# Patient Record
Sex: FEMALE | Race: WHITE | NOT HISPANIC OR LATINO | Employment: UNEMPLOYED | URBAN - METROPOLITAN AREA
[De-identification: names, ages, dates, MRNs, and addresses within clinical notes are randomized per-mention and may not be internally consistent; named-entity substitution may affect disease eponyms.]

---

## 2017-02-02 ENCOUNTER — HOSPITAL ENCOUNTER (EMERGENCY)
Facility: HOSPITAL | Age: 37
Discharge: HOME | End: 2017-02-02
Attending: EMERGENCY MEDICINE | Admitting: EMERGENCY MEDICINE

## 2017-02-02 ENCOUNTER — APPOINTMENT (OUTPATIENT)
Dept: GENERAL RADIOLOGY | Facility: HOSPITAL | Age: 37
End: 2017-02-02

## 2017-02-02 VITALS
BODY MASS INDEX: 32.14 KG/M2 | DIASTOLIC BLOOD PRESSURE: 102 MMHG | WEIGHT: 200 LBS | RESPIRATION RATE: 14 BRPM | SYSTOLIC BLOOD PRESSURE: 139 MMHG | HEIGHT: 66 IN | TEMPERATURE: 97.4 F | HEART RATE: 96 BPM | OXYGEN SATURATION: 98 %

## 2017-02-02 DIAGNOSIS — K30 INDIGESTION: ICD-10-CM

## 2017-02-02 DIAGNOSIS — S60.221A CONTUSION OF RIGHT HAND, INITIAL ENCOUNTER: Primary | ICD-10-CM

## 2017-02-02 PROCEDURE — 99282 EMERGENCY DEPT VISIT SF MDM: CPT | Performed by: EMERGENCY MEDICINE

## 2017-02-02 PROCEDURE — 99283 EMERGENCY DEPT VISIT LOW MDM: CPT

## 2017-02-02 PROCEDURE — 73130 X-RAY EXAM OF HAND: CPT

## 2017-02-02 RX ORDER — MAGNESIUM HYDROXIDE/ALUMINUM HYDROXICE/SIMETHICONE 120; 1200; 1200 MG/30ML; MG/30ML; MG/30ML
30 SUSPENSION ORAL ONCE
Status: COMPLETED | OUTPATIENT
Start: 2017-02-02 | End: 2017-02-02

## 2017-02-02 RX ORDER — ACETAMINOPHEN 500 MG
500 TABLET ORAL ONCE
Status: COMPLETED | OUTPATIENT
Start: 2017-02-02 | End: 2017-02-02

## 2017-02-02 RX ORDER — SERTRALINE HYDROCHLORIDE 100 MG/1
100 TABLET, FILM COATED ORAL DAILY
COMMUNITY

## 2017-02-02 RX ADMIN — LIDOCAINE HYDROCHLORIDE 15 ML: 20 SOLUTION ORAL; TOPICAL at 04:39

## 2017-02-02 RX ADMIN — ACETAMINOPHEN 500 MG: 500 TABLET, COATED ORAL at 04:41

## 2017-02-02 RX ADMIN — ALUMINUM HYDROXIDE, MAGNESIUM HYDROXIDE, AND SIMETHICONE 30 ML: 200; 200; 20 SUSPENSION ORAL at 04:39

## 2017-02-02 NOTE — ED PROVIDER NOTES
Subjective   History of Present Illness  History of Present Illness    Chief complaint: hand pain, indigestion    Location: Right hand    Quality/Severity:  Moderate pain    Timing/Duration: injury occurred about 1 hr ago    Modifying Factors: worse w/ movement    Narrative: Patient says that she has been having a lot of indigestion and nausea symptoms tonight.  Fortunately, she was actually headed to our facility for assistance with those symptoms when she accidentally shut her own hand in her car door.  This caused some immediate pain and numbness to her right hand near the thumb.  She says she has a lot of pain with movement of her right hand now.  The pain is radiating up towards her wrist and forearm areas from the hand.  She denies any other injuries.  She denies any fevers.  She says she has a history of gastritis and hiatal hernia.  She is cared for by Dr. Salinas her gastroenterologist.  She has an upcoming endoscopy procedure in the near future scheduled.    Associated Symptoms: None    Review of Systems   Constitutional: Negative for activity change and fever.   HENT: Negative.    Eyes: Negative for pain and visual disturbance.   Respiratory: Negative for cough and shortness of breath.    Cardiovascular: Negative for chest pain.   Gastrointestinal: Positive for abdominal pain and nausea. Negative for diarrhea and vomiting.   Genitourinary: Negative for dysuria.   Musculoskeletal: Positive for arthralgias and myalgias.   Skin: Negative for color change and rash.   Neurological: Negative for syncope and headaches.   All other systems reviewed and are negative.      Past Medical History   Diagnosis Date   • Anxiety    • Asthma    • Depression    • Endometriosis    • GERD (gastroesophageal reflux disease)    • Hiatal hernia    • Hiatal hernia    • Hyperlipidemia    • Hypertension    • Injury of back    • Leg pain    • Leukocytosis, likely related to tobacco use    • Liver cyst    • Melanoma       Superficial spreading lentiginous, right lower extremity   • Migraines        Allergies   Allergen Reactions   • Bactrim  [Sulfamethoxazole-Trimethoprim]    • Hydrocodone    • Lisinopril    • Meperidine    • Morphine    • Pregabalin    • Tramadol        Past Surgical History   Procedure Laterality Date   • Skin biopsy     • Skin cancer excision Right      Leg melanoma   • Tubal abdominal ligation     •  section     • Hemorrhoidectomy     • Colonoscopy     • Pelvic laparoscopy     • Appendectomy     • Hysteroscopy endometrial ablation     • Cholecystectomy     • Ovarian cyst removal Left      Benign tumor       Family History   Problem Relation Age of Onset   • Cervical cancer Mother 26   • Skin cancer Mother 54     Basal cell   • Diabetes Mother    • Skin cancer Father 53     Non-melanoma   • Heart disease Father    • Hypertension Father    • Other Daughter      Enlarged spleen   • Brain cancer Other 60   • Pancreatic cancer Other    • Diabetes Other    • Heart disease Other    • Hypertension Other        Social History     Social History   • Marital status:      Spouse name: N/A   • Number of children: N/A   • Years of education: N/A     Occupational History   •  Unemployed     Social History Main Topics   • Smoking status: Current Every Day Smoker     Packs/day: 1.00     Years: 10.00     Types: Cigarettes   • Smokeless tobacco: None   • Alcohol use No   • Drug use: No   • Sexual activity: Not Asked     Other Topics Concern   • None     Social History Narrative     ED Triage Vitals   Temp Heart Rate Resp BP SpO2   171 17 -- 17   97.4 °F (36.3 °C) 96 14  98 %      Temp src Heart Rate Source Patient Position BP Location FiO2 (%)   17 -- -- -- --   Oral               Objective   Physical Exam   Constitutional: She is oriented to person, place, and time. She appears well-developed and well-nourished. She appears distressed (mild).   HENT:   Head:  Normocephalic and atraumatic.   Eyes: EOM are normal. Pupils are equal, round, and reactive to light. Right eye exhibits no discharge. Left eye exhibits no discharge.   Neck: Normal range of motion. Neck supple.   Cardiovascular: Normal rate and regular rhythm.    Pulmonary/Chest: Effort normal. No respiratory distress.   Abdominal: Soft. There is no tenderness.   Musculoskeletal: Normal range of motion. She exhibits tenderness. She exhibits no edema or deformity.   The right hand is moderately tender to palpation along the first metacarpal region and second metacarpal regions.  There is some mild bruising and swelling in this associated region.  The carpal bones appear to be nontender.  The radial pulse is bounding and normal.  The distal sensation is normal and intact all 5 fingers.  Patient shows some slightly decreased range of motion to his thumb extension and flexion but it appears that this is a result of her discomfort with movement rather than a functional deficit.   Neurological: She is alert and oriented to person, place, and time. No cranial nerve deficit.   Skin: Skin is warm and dry. No rash noted. She is not diaphoretic. No erythema.   Psychiatric: She has a normal mood and affect. Her behavior is normal. Judgment and thought content normal.   Nursing note and vitals reviewed.      RADIOLOGY        Study: X-ray right hand    Findings: No acute fracture or dislocation    Interpreted Contemporaneously by self, independently viewed by me          Procedures         ED Course  ED Course   Comment By Time   I reviewed the x-ray which appears to be normal.  Ordered 1 GI cocktail for her GERD symptoms.  No worrisome findings on physical exam to justify further diagnostics right now.  Advised her on continued symptomatic management for her hand injury. Yosi Mathew MD 02/02 8525                  [facility]    Final diagnoses:   Contusion of right hand, initial encounter   Indigestion            Yosi Mathew,  MD  02/02/17 0448

## 2017-02-02 NOTE — DISCHARGE INSTRUCTIONS
Rest, ice and elevate your injured hand as needed.  May take Tylenol every 8 hours as needed for pain.  Please return to the emergency room for any worsening pain, swelling, weakness, numbness or any other concerns.

## 2017-02-02 NOTE — ED NOTES
"Patient was overheard in the waiting room telling her boyfriend \"I am tired of not getting nothing.\"  Patient's boyfriend responded by saying, \"Next time maybe I'll try.\"     Jaycee Girard RN  02/02/17 0519    "

## 2017-02-07 ENCOUNTER — HOSPITAL ENCOUNTER (EMERGENCY)
Facility: HOSPITAL | Age: 37
Discharge: HOME | End: 2017-02-07
Attending: EMERGENCY MEDICINE | Admitting: EMERGENCY MEDICINE

## 2017-02-07 ENCOUNTER — APPOINTMENT (OUTPATIENT)
Dept: GENERAL RADIOLOGY | Facility: HOSPITAL | Age: 37
End: 2017-02-07

## 2017-02-07 VITALS
HEIGHT: 66 IN | RESPIRATION RATE: 16 BRPM | OXYGEN SATURATION: 98 % | WEIGHT: 199 LBS | HEART RATE: 99 BPM | TEMPERATURE: 97.8 F | SYSTOLIC BLOOD PRESSURE: 134 MMHG | DIASTOLIC BLOOD PRESSURE: 108 MMHG | BODY MASS INDEX: 31.98 KG/M2

## 2017-02-07 DIAGNOSIS — J40 BRONCHITIS: Primary | ICD-10-CM

## 2017-02-07 PROCEDURE — 71020 HC CHEST PA AND LATERAL: CPT

## 2017-02-07 PROCEDURE — 99282 EMERGENCY DEPT VISIT SF MDM: CPT | Performed by: EMERGENCY MEDICINE

## 2017-02-07 PROCEDURE — 99283 EMERGENCY DEPT VISIT LOW MDM: CPT

## 2017-02-07 RX ORDER — AZITHROMYCIN 250 MG/1
TABLET, FILM COATED ORAL
Qty: 6 TABLET | Refills: 0 | Status: SHIPPED | OUTPATIENT
Start: 2017-02-07 | End: 2017-08-15

## 2017-02-07 RX ORDER — BENZONATATE 200 MG/1
200 CAPSULE ORAL 3 TIMES DAILY PRN
Qty: 30 CAPSULE | Refills: 0 | Status: SHIPPED | OUTPATIENT
Start: 2017-02-07 | End: 2017-08-15

## 2017-02-07 RX ORDER — AZITHROMYCIN 250 MG/1
500 TABLET, FILM COATED ORAL ONCE
Status: COMPLETED | OUTPATIENT
Start: 2017-02-07 | End: 2017-02-07

## 2017-02-07 RX ORDER — BENZONATATE 100 MG/1
200 CAPSULE ORAL ONCE
Status: COMPLETED | OUTPATIENT
Start: 2017-02-07 | End: 2017-02-07

## 2017-02-07 RX ORDER — TRAZODONE HYDROCHLORIDE 100 MG/1
100 TABLET ORAL AS NEEDED
COMMUNITY
End: 2017-08-15

## 2017-02-07 RX ADMIN — AZITHROMYCIN 500 MG: 250 TABLET, FILM COATED ORAL at 02:10

## 2017-02-07 RX ADMIN — BENZONATATE 200 MG: 100 CAPSULE, LIQUID FILLED ORAL at 02:10

## 2017-02-07 NOTE — ED PROVIDER NOTES
Subjective     History provided by:  Patient    History of Present Illness    · Chief complaint: Cough and side pain    · Location: pain in both of her sides of her chest posteriorly    · Quality/Severity: Nonproductive cough.  Sharp pain.    · Timing/Onset: Cough is been present for the last 3 months.  The pain started a couple of days ago.    · Modifying Factors: Coughing and taking a deep breath exacerbates the discomfort in her posterior sides.    · Associated symptoms: He denies any fever or veena shortness of breath.  She states the cough is causing her to get a headache.    Narrative: The patient is a 36-year-old white female complaining of a 3 month history of a cough.  She states she seen her PCP Fernanda moreno monthly for this and states she Is getting pneumonia and bronchitis for which she's been treated with antibiotics.  The last couple of days she's had pain in her size when she coughs and takes a deep breath.  Patient is a smoker.  She states she was formally in pain management, and her explanation for why she is no longer in pain management made no sense.  She states she doesn't have menstrual periods as she is status post uterine ablation.  She is unemployed.    ED Triage Vitals   Temp Heart Rate Resp BP SpO2   02/07/17 0047 02/07/17 0047 02/07/17 0053 02/07/17 0047 02/07/17 0047   97.8 °F (36.6 °C) 99 16 134/108 98 %      Temp src Heart Rate Source Patient Position BP Location FiO2 (%)   -- -- -- -- --              Review of Systems   Constitutional: Negative for activity change, appetite change, chills, diaphoresis, fatigue and fever.   HENT: Negative for congestion, dental problem, ear pain, hearing loss, mouth sores, nosebleeds, postnasal drip, rhinorrhea, sinus pressure, sore throat, trouble swallowing and voice change.    Eyes: Negative for photophobia, pain, discharge, redness and visual disturbance.   Respiratory: Positive for cough. Negative for chest tightness, shortness of breath, wheezing  and stridor.    Cardiovascular: Negative for chest pain, palpitations and leg swelling.   Gastrointestinal: Negative for abdominal pain, diarrhea, nausea and vomiting.   Genitourinary: Negative for difficulty urinating, dysuria, flank pain, frequency, hematuria and urgency.   Musculoskeletal: Positive for back pain. Negative for arthralgias, gait problem, joint swelling, myalgias, neck pain and neck stiffness.   Skin: Negative for color change and rash.   Neurological: Positive for headaches. Negative for dizziness, tremors, seizures, syncope, facial asymmetry, speech difficulty, weakness, light-headedness and numbness.   Hematological: Negative for adenopathy.   Psychiatric/Behavioral: Negative.  Negative for confusion and decreased concentration. The patient is not nervous/anxious.        Past Medical History   Diagnosis Date   • Anxiety    • Asthma    • Depression    • Endometriosis    • GERD (gastroesophageal reflux disease)    • Hiatal hernia    • Hiatal hernia    • Hyperlipidemia    • Hypertension    • Injury of back    • Leg pain    • Leukocytosis, likely related to tobacco use    • Liver cyst    • Melanoma      Superficial spreading lentiginous, right lower extremity   • Migraines        Allergies   Allergen Reactions   • Bactrim  [Sulfamethoxazole-Trimethoprim]    • Hydrocodone    • Lisinopril    • Meperidine    • Morphine    • Pregabalin    • Tramadol        Past Surgical History   Procedure Laterality Date   • Skin biopsy     • Skin cancer excision Right      Leg melanoma   • Tubal abdominal ligation     •  section     • Hemorrhoidectomy     • Colonoscopy     • Pelvic laparoscopy     • Appendectomy     • Hysteroscopy endometrial ablation     • Cholecystectomy     • Ovarian cyst removal Left      Benign tumor   • Ovary surgery Left 2000       Family History   Problem Relation Age of Onset   • Cervical cancer Mother 26   • Skin cancer Mother 54     Basal cell   • Diabetes Mother    • Skin cancer  Father 53     Non-melanoma   • Heart disease Father    • Hypertension Father    • Other Daughter      Enlarged spleen   • Brain cancer Other 60   • Pancreatic cancer Other    • Diabetes Other    • Heart disease Other    • Hypertension Other        Social History     Social History   • Marital status:      Spouse name: N/A   • Number of children: N/A   • Years of education: N/A     Occupational History   •  Unemployed     Social History Main Topics   • Smoking status: Current Every Day Smoker     Packs/day: 1.00     Years: 10.00     Types: Cigarettes   • Smokeless tobacco: None   • Alcohol use No   • Drug use: No   • Sexual activity: Not Asked     Other Topics Concern   • None     Social History Narrative   • None           Objective   Physical Exam   Constitutional: She is oriented to person, place, and time. She appears well-developed and well-nourished. No distress.   HENT:   Head: Normocephalic and atraumatic.   Right Ear: External ear normal.   Left Ear: External ear normal.   Nose: Nose normal.   Mouth/Throat: Oropharynx is clear and moist. No oropharyngeal exudate.   Eyes: Conjunctivae and EOM are normal. Pupils are equal, round, and reactive to light. Right eye exhibits no discharge. Left eye exhibits no discharge. No scleral icterus.   Neck: Normal range of motion. Neck supple. No JVD present. No thyromegaly present.   Cardiovascular: Normal rate, regular rhythm and normal heart sounds.    No murmur heard.  Pulmonary/Chest: Effort normal and breath sounds normal. She has no wheezes. She has no rales. She exhibits no tenderness.   Abdominal: Soft. Bowel sounds are normal. She exhibits no distension. There is no tenderness.   Musculoskeletal: Normal range of motion. She exhibits no edema, tenderness or deformity.   Lymphadenopathy:     She has no cervical adenopathy.   Neurological: She is alert and oriented to person, place, and time. No cranial nerve deficit. Coordination normal.   No focal motor  sensory deficit   Skin: Skin is warm and dry. No rash noted. She is not diaphoretic.   Psychiatric: She has a normal mood and affect. Her behavior is normal. Judgment and thought content normal.   Nursing note and vitals reviewed.      Procedures         ED Course  ED Course   Comment By Time   [facility] Report [id number] shows the patient fill her oxycodone on 5 mg #60 monthly up until August 31 of last year.  The patient had been in pain management. Krish Barry MD 02/07 0116                  MDM  Number of Diagnoses or Management Options  Bronchitis: new and requires workup     Amount and/or Complexity of Data Reviewed  Tests in the radiology section of CPT®: ordered and reviewed  Independent visualization of images, tracings, or specimens: yes    Risk of Complications, Morbidity, and/or Mortality  Presenting problems: moderate  Diagnostic procedures: moderate  Management options: moderate  General comments: My differential diagnosis includes but is not limited to: Viral upper respiratory tract infection, viral bronchitis, viral pneumonia, bacterial bronchitis, bacterial pneumonia, pertussis, influenza, asthma exacerbation, environmental allergen    Patient Progress  Patient progress: improved      Final diagnoses:   Bronchitis           Labs Reviewed - No data to display  XR Chest 2 View   ED Interpretation   No acute disease.             Medication List      New Prescriptions          azithromycin 250 MG tablet   Commonly known as:  ZITHROMAX Z-LISBETH   Take 2 tablets the first day, then 1 tablet daily for 4 days.       benzonatate 200 MG capsule   Commonly known as:  TESSALON   Take 1 capsule by mouth 3 (Three) Times a Day As Needed for cough for up   to 30 doses.         Stop          citalopram 40 MG tablet   Commonly known as:  CeleXA       PERCOCET 5-325 MG per tablet   Generic drug:  oxyCODONE-acetaminophen       VITAMIN B COMPLEX-C capsule       WELCHOL 625 MG tablet   Generic drug:  colesevelam                 Krish Barry MD  02/07/17 8899

## 2017-02-08 ENCOUNTER — LAB REQUISITION (OUTPATIENT)
Dept: LAB | Facility: HOSPITAL | Age: 37
End: 2017-02-08

## 2017-02-08 DIAGNOSIS — K30 FUNCTIONAL DYSPEPSIA: ICD-10-CM

## 2017-02-08 PROCEDURE — 88312 SPECIAL STAINS GROUP 1: CPT | Performed by: INTERNAL MEDICINE

## 2017-02-08 PROCEDURE — 88305 TISSUE EXAM BY PATHOLOGIST: CPT | Performed by: INTERNAL MEDICINE

## 2017-02-10 LAB
CYTO UR: NORMAL
LAB AP CASE REPORT: NORMAL
Lab: NORMAL
PATH REPORT.FINAL DX SPEC: NORMAL
PATH REPORT.GROSS SPEC: NORMAL

## 2017-04-14 ENCOUNTER — HOSPITAL ENCOUNTER (EMERGENCY)
Facility: HOSPITAL | Age: 37
Discharge: HOME | End: 2017-04-14
Attending: EMERGENCY MEDICINE | Admitting: EMERGENCY MEDICINE

## 2017-04-14 VITALS
TEMPERATURE: 98.3 F | OXYGEN SATURATION: 98 % | SYSTOLIC BLOOD PRESSURE: 167 MMHG | WEIGHT: 190 LBS | HEART RATE: 95 BPM | HEIGHT: 66 IN | BODY MASS INDEX: 30.53 KG/M2 | RESPIRATION RATE: 18 BRPM | DIASTOLIC BLOOD PRESSURE: 111 MMHG

## 2017-04-14 DIAGNOSIS — L73.9 FOLLICULITIS: Primary | ICD-10-CM

## 2017-04-14 PROCEDURE — 99282 EMERGENCY DEPT VISIT SF MDM: CPT

## 2017-04-14 PROCEDURE — 99282 EMERGENCY DEPT VISIT SF MDM: CPT | Performed by: EMERGENCY MEDICINE

## 2017-04-14 RX ORDER — HYDROXYZINE PAMOATE 25 MG/1
25 CAPSULE ORAL 3 TIMES DAILY PRN
Qty: 30 CAPSULE | Refills: 0 | Status: SHIPPED | OUTPATIENT
Start: 2017-04-14 | End: 2017-08-15

## 2017-04-14 RX ORDER — PREDNISONE 10 MG/1
TABLET ORAL
Qty: 21 TABLET | Refills: 0 | Status: SHIPPED | OUTPATIENT
Start: 2017-04-14 | End: 2017-08-15

## 2017-04-14 RX ORDER — CEPHALEXIN 500 MG/1
500 CAPSULE ORAL 3 TIMES DAILY
Qty: 21 CAPSULE | Refills: 0 | Status: SHIPPED | OUTPATIENT
Start: 2017-04-14 | End: 2017-04-21

## 2017-04-14 NOTE — DISCHARGE INSTRUCTIONS
Medications directed.  Wash lesions 3 times daily with antibacterial soap.  Pat dry.  Apply thin layer of Neosporin.  Do not pick at lesions.  Do not scratch lesions.  Follow up with Dr. Lieberman as above.  Return to ED for medical emergencies.    Hypertension  Hypertension, commonly called high blood pressure, is when the force of blood pumping through your arteries is too strong. Your arteries are the blood vessels that carry blood from your heart throughout your body. A blood pressure reading consists of a higher number over a lower number, such as 110/72. The higher number (systolic) is the pressure inside your arteries when your heart pumps. The lower number (diastolic) is the pressure inside your arteries when your heart relaxes. Ideally you want your blood pressure below 120/80.  Hypertension forces your heart to work harder to pump blood. Your arteries may become narrow or stiff. Having untreated or uncontrolled hypertension can cause heart attack, stroke, kidney disease, and other problems.  RISK FACTORS  Some risk factors for high blood pressure are controllable. Others are not.   Risk factors you cannot control include:   · Race. You may be at higher risk if you are .  · Age. Risk increases with age.  · Gender. Men are at higher risk than women before age 45 years. After age 65, women are at higher risk than men.  Risk factors you can control include:  · Not getting enough exercise or physical activity.  · Being overweight.  · Getting too much fat, sugar, calories, or salt in your diet.  · Drinking too much alcohol.  SIGNS AND SYMPTOMS  Hypertension does not usually cause signs or symptoms. Extremely high blood pressure (hypertensive crisis) may cause headache, anxiety, shortness of breath, and nosebleed.  DIAGNOSIS  To check if you have hypertension, your health care provider will measure your blood pressure while you are seated, with your arm held at the level of your heart. It should be  measured at least twice using the same arm. Certain conditions can cause a difference in blood pressure between your right and left arms. A blood pressure reading that is higher than normal on one occasion does not mean that you need treatment. If it is not clear whether you have high blood pressure, you may be asked to return on a different day to have your blood pressure checked again. Or, you may be asked to monitor your blood pressure at home for 1 or more weeks.  TREATMENT  Treating high blood pressure includes making lifestyle changes and possibly taking medicine. Living a healthy lifestyle can help lower high blood pressure. You may need to change some of your habits.  Lifestyle changes may include:  · Following the DASH diet. This diet is high in fruits, vegetables, and whole grains. It is low in salt, red meat, and added sugars.  · Keep your sodium intake below 2,300 mg per day.  · Getting at least 30-45 minutes of aerobic exercise at least 4 times per week.  · Losing weight if necessary.  · Not smoking.  · Limiting alcoholic beverages.  · Learning ways to reduce stress.  Your health care provider may prescribe medicine if lifestyle changes are not enough to get your blood pressure under control, and if one of the following is true:  · You are 18-59 years of age and your systolic blood pressure is above 140.  · You are 60 years of age or older, and your systolic blood pressure is above 150.  · Your diastolic blood pressure is above 90.  · You have diabetes, and your systolic blood pressure is over 140 or your diastolic blood pressure is over 90.  · You have kidney disease and your blood pressure is above 140/90.  · You have heart disease and your blood pressure is above 140/90.  Your personal target blood pressure may vary depending on your medical conditions, your age, and other factors.  HOME CARE INSTRUCTIONS  · Have your blood pressure rechecked as directed by your health care provider.    · Take  medicines only as directed by your health care provider. Follow the directions carefully. Blood pressure medicines must be taken as prescribed. The medicine does not work as well when you skip doses. Skipping doses also puts you at risk for problems.  · Do not smoke.    · Monitor your blood pressure at home as directed by your health care provider.   SEEK MEDICAL CARE IF:   · You think you are having a reaction to medicines taken.  · You have recurrent headaches or feel dizzy.  · You have swelling in your ankles.  · You have trouble with your vision.  SEEK IMMEDIATE MEDICAL CARE IF:  · You develop a severe headache or confusion.  · You have unusual weakness, numbness, or feel faint.  · You have severe chest or abdominal pain.  · You vomit repeatedly.  · You have trouble breathing.  MAKE SURE YOU:   · Understand these instructions.  · Will watch your condition.  · Will get help right away if you are not doing well or get worse.     This information is not intended to replace advice given to you by your health care provider. Make sure you discuss any questions you have with your health care provider.     Document Released: 12/18/2006 Document Revised: 05/03/2016 Document Reviewed: 10/10/2014  [employer] Patient Education ©2016 [employer]

## 2017-04-14 NOTE — ED PROVIDER NOTES
"Subjective   Patient is a 36 y.o. female presenting with rash.   History provided by:  Patient  Rash   Location:  Face and torso  Facial rash location:  Face  Torso rash location:  L chest, R chest, abd LLQ and abd RLQ  Quality: itchiness    Severity:  Moderate  Duration:  1 week  Progression:  Worsening  Chronicity:  New  Context: new detergent/soap    Context: not animal contact, not chemical exposure, not diapers, not eggs, not exposure to similar rash, not food, not hot tub use, not insect bite/sting, not medications, not nuts, not plant contact, not pollen, not pregnancy, not sick contacts and not sun exposure    Relieved by:  None tried  Exacerbated by: scratching.  Ineffective treatments:  None tried  Associated symptoms: no abdominal pain, no diarrhea, no fatigue, no fever, no headaches, no hoarse voice, no induration, no joint pain, no myalgias, no nausea, no periorbital edema, no shortness of breath, no sore throat, no throat swelling, no tongue swelling, no URI, not vomiting and not wheezing      HPI Narrative:Ms. Dietz is a 35 yo WF who presents secondary to rash. Onset 1 week ago.  Patient reports the rash began on her abdomen.  It now involves her chest and face.  Patient reports associated itching.  States that she has been picking at the lesions.  She states her s.o. Bought some \"cheap detergent\" but no other changes in her home or work environment. Pt presents for evaluation.        Review of Systems   Constitutional: Negative.  Negative for appetite change, diaphoresis, fatigue and fever.   HENT: Negative.  Negative for hoarse voice and sore throat.    Eyes: Negative.    Respiratory: Negative for cough, chest tightness, shortness of breath and wheezing.    Cardiovascular: Negative for chest pain, palpitations and leg swelling.   Gastrointestinal: Negative for abdominal pain, diarrhea, nausea and vomiting.   Genitourinary: Negative.  Negative for difficulty urinating, flank pain, frequency and " hematuria.   Musculoskeletal: Negative.  Negative for arthralgias and myalgias.   Skin: Positive for rash. Negative for color change and pallor.   Neurological: Negative.  Negative for dizziness, seizures, syncope and headaches.   Psychiatric/Behavioral: Negative.  Negative for agitation, behavioral problems and hallucinations.       Past Medical History:   Diagnosis Date   • Anxiety    • Asthma    • Depression    • Endometriosis    • GERD (gastroesophageal reflux disease)    • Hiatal hernia    • Hiatal hernia    • Hyperlipidemia    • Hypertension    • Injury of back    • Leg pain    • Leukocytosis, likely related to tobacco use    • Liver cyst    • Melanoma     Superficial spreading lentiginous, right lower extremity   • Migraines        Allergies   Allergen Reactions   • Bactrim  [Sulfamethoxazole-Trimethoprim]    • Hydrocodone    • Lisinopril    • Meperidine    • Morphine    • Pregabalin    • Tramadol        Past Surgical History:   Procedure Laterality Date   • APPENDECTOMY     •  SECTION     • CHOLECYSTECTOMY     • COLONOSCOPY     • HEMORRHOIDECTOMY     • HYSTEROSCOPY ENDOMETRIAL ABLATION     • OVARIAN CYST REMOVAL Left     Benign tumor   • OVARY SURGERY Left    • PELVIC LAPAROSCOPY     • SKIN BIOPSY     • SKIN CANCER EXCISION Right     Leg melanoma   • TUBAL ABDOMINAL LIGATION         Family History   Problem Relation Age of Onset   • Cervical cancer Mother 26   • Skin cancer Mother 54     Basal cell   • Diabetes Mother    • Skin cancer Father 53     Non-melanoma   • Heart disease Father    • Hypertension Father    • Other Daughter      Enlarged spleen   • Brain cancer Other 60   • Pancreatic cancer Other    • Diabetes Other    • Heart disease Other    • Hypertension Other        Social History     Social History   • Marital status:      Spouse name: N/A   • Number of children: N/A   • Years of education: N/A     Occupational History   •  Unemployed     Social History Main Topics   •  Smoking status: Current Every Day Smoker     Packs/day: 1.00     Years: 10.00     Types: Cigarettes   • Smokeless tobacco: None   • Alcohol use No   • Drug use: No   • Sexual activity: Not Asked     Other Topics Concern   • None     Social History Narrative           Objective   Physical Exam   Constitutional: She is oriented to person, place, and time. She appears well-developed and well-nourished. No distress.   HENT:   Head: Normocephalic and atraumatic.   Right Ear: External ear normal.   Left Ear: External ear normal.   Nose: Nose normal.   Mouth/Throat: Oropharynx is clear and moist.   Eyes: Conjunctivae and EOM are normal. Pupils are equal, round, and reactive to light.   Neck: Normal range of motion. Neck supple.   Pulmonary/Chest: Effort normal.   Musculoskeletal: Normal range of motion.   Neurological: She is alert and oriented to person, place, and time. She has normal reflexes.   Skin: Skin is warm and dry. Lesion noted. She is not diaphoretic.        Psychiatric: She has a normal mood and affect. Her behavior is normal.   Nursing note and vitals reviewed.      Procedures         ED Course  ED Course   Comment By Time   04/14/17  7:17 PM  Etiology of patient's rash is unclear.  While  changed laundry detergents the rash does follow clothing lines.  She does not have a rash involving the well-being of her hands or feet, axilla or groin region.  Not consistent with scabies or bedbugs.  I will treat as folliculitis.  Starting patient on antibiotics, steroids and Vistaril.  Patient is under the care of Dr. Zechariah Lieberman dermatology for melanoma.  I recommend she follow-up with him this coming week. Sandro Wallis MD 04/14 6831                  [facility]  Number of Diagnoses or Management Options  Folliculitis: new and does not require workup  Risk of Complications, Morbidity, and/or Mortality  Presenting problems: low  Diagnostic procedures: minimal  Management options: minimal    Patient  Progress  Patient progress: stable      Final diagnoses:   Folliculitis            Sandro Wallis MD  04/14/17 2390

## 2017-05-18 ENCOUNTER — HOSPITAL ENCOUNTER (EMERGENCY)
Facility: HOSPITAL | Age: 37
Discharge: HOME | End: 2017-05-18
Attending: EMERGENCY MEDICINE | Admitting: EMERGENCY MEDICINE

## 2017-05-18 VITALS
HEART RATE: 94 BPM | DIASTOLIC BLOOD PRESSURE: 84 MMHG | WEIGHT: 193.38 LBS | BODY MASS INDEX: 31.08 KG/M2 | OXYGEN SATURATION: 97 % | RESPIRATION RATE: 20 BRPM | HEIGHT: 66 IN | TEMPERATURE: 99.1 F | SYSTOLIC BLOOD PRESSURE: 132 MMHG

## 2017-05-18 DIAGNOSIS — J02.9 ACUTE PHARYNGITIS, UNSPECIFIED ETIOLOGY: Primary | ICD-10-CM

## 2017-05-18 LAB — S PYO AG THROAT QL: NEGATIVE

## 2017-05-18 PROCEDURE — 99282 EMERGENCY DEPT VISIT SF MDM: CPT | Performed by: EMERGENCY MEDICINE

## 2017-05-18 PROCEDURE — 87081 CULTURE SCREEN ONLY: CPT | Performed by: EMERGENCY MEDICINE

## 2017-05-18 PROCEDURE — 87880 STREP A ASSAY W/OPTIC: CPT | Performed by: EMERGENCY MEDICINE

## 2017-05-18 PROCEDURE — 99283 EMERGENCY DEPT VISIT LOW MDM: CPT

## 2017-05-18 RX ORDER — AMOXICILLIN 500 MG/1
500 CAPSULE ORAL 3 TIMES DAILY
Qty: 21 CAPSULE | Refills: 0 | Status: SHIPPED | OUTPATIENT
Start: 2017-05-18 | End: 2017-05-25

## 2017-05-20 LAB — BACTERIA SPEC AEROBE CULT: NORMAL

## 2017-05-28 ENCOUNTER — HOSPITAL ENCOUNTER (EMERGENCY)
Facility: HOSPITAL | Age: 37
Discharge: HOME | End: 2017-05-28
Attending: EMERGENCY MEDICINE | Admitting: EMERGENCY MEDICINE

## 2017-05-28 VITALS
OXYGEN SATURATION: 95 % | TEMPERATURE: 98.1 F | HEIGHT: 66 IN | HEART RATE: 82 BPM | RESPIRATION RATE: 18 BRPM | BODY MASS INDEX: 30.86 KG/M2 | SYSTOLIC BLOOD PRESSURE: 151 MMHG | WEIGHT: 192 LBS | DIASTOLIC BLOOD PRESSURE: 97 MMHG

## 2017-05-28 DIAGNOSIS — R51.9 RECURRENT HEADACHE: Primary | ICD-10-CM

## 2017-05-28 DIAGNOSIS — H10.33 ACUTE CONJUNCTIVITIS OF BOTH EYES, UNSPECIFIED ACUTE CONJUNCTIVITIS TYPE: ICD-10-CM

## 2017-05-28 PROCEDURE — 99283 EMERGENCY DEPT VISIT LOW MDM: CPT

## 2017-05-28 PROCEDURE — 63710000001 PROCHLORPERAZINE MALEATE PER 5 MG: Performed by: EMERGENCY MEDICINE

## 2017-05-28 PROCEDURE — 99282 EMERGENCY DEPT VISIT SF MDM: CPT | Performed by: EMERGENCY MEDICINE

## 2017-05-28 RX ORDER — ERYTHROMYCIN 5 MG/G
OINTMENT OPHTHALMIC EVERY 6 HOURS
Qty: 3.5 G | Refills: 0 | Status: SHIPPED | OUTPATIENT
Start: 2017-05-28 | End: 2017-06-02

## 2017-05-28 RX ORDER — PROCHLORPERAZINE MALEATE 5 MG/1
10 TABLET ORAL ONCE
Status: COMPLETED | OUTPATIENT
Start: 2017-05-28 | End: 2017-05-28

## 2017-05-28 RX ORDER — OXYCODONE HYDROCHLORIDE AND ACETAMINOPHEN 5; 325 MG/1; MG/1
1 TABLET ORAL ONCE
Status: COMPLETED | OUTPATIENT
Start: 2017-05-28 | End: 2017-05-28

## 2017-05-28 RX ADMIN — OXYCODONE HYDROCHLORIDE AND ACETAMINOPHEN 1 TABLET: 5; 325 TABLET ORAL at 15:44

## 2017-05-28 RX ADMIN — PROCHLORPERAZINE MALEATE 10 MG: 5 TABLET, FILM COATED ORAL at 15:44

## 2017-06-17 ENCOUNTER — HOSPITAL ENCOUNTER (EMERGENCY)
Facility: HOSPITAL | Age: 37
Discharge: HOME | End: 2017-06-18
Attending: EMERGENCY MEDICINE | Admitting: EMERGENCY MEDICINE

## 2017-06-17 DIAGNOSIS — R25.2 MUSCLE CRAMPS: ICD-10-CM

## 2017-06-17 DIAGNOSIS — R51.9 GENERALIZED HEADACHE: Primary | ICD-10-CM

## 2017-06-17 PROCEDURE — 96374 THER/PROPH/DIAG INJ IV PUSH: CPT

## 2017-06-17 PROCEDURE — 96375 TX/PRO/DX INJ NEW DRUG ADDON: CPT

## 2017-06-17 PROCEDURE — 25010000002 KETOROLAC TROMETHAMINE PER 15 MG: Performed by: EMERGENCY MEDICINE

## 2017-06-17 PROCEDURE — 80053 COMPREHEN METABOLIC PANEL: CPT | Performed by: EMERGENCY MEDICINE

## 2017-06-17 PROCEDURE — 99284 EMERGENCY DEPT VISIT MOD MDM: CPT

## 2017-06-17 PROCEDURE — 25010000002 ONDANSETRON PER 1 MG: Performed by: EMERGENCY MEDICINE

## 2017-06-17 PROCEDURE — 99282 EMERGENCY DEPT VISIT SF MDM: CPT | Performed by: EMERGENCY MEDICINE

## 2017-06-17 RX ORDER — ONDANSETRON 2 MG/ML
4 INJECTION INTRAMUSCULAR; INTRAVENOUS ONCE
Status: COMPLETED | OUTPATIENT
Start: 2017-06-17 | End: 2017-06-17

## 2017-06-17 RX ORDER — KETOROLAC TROMETHAMINE 30 MG/ML
30 INJECTION, SOLUTION INTRAMUSCULAR; INTRAVENOUS ONCE
Status: COMPLETED | OUTPATIENT
Start: 2017-06-17 | End: 2017-06-17

## 2017-06-17 RX ORDER — SODIUM CHLORIDE 0.9 % (FLUSH) 0.9 %
10 SYRINGE (ML) INJECTION AS NEEDED
Status: DISCONTINUED | OUTPATIENT
Start: 2017-06-17 | End: 2017-06-18 | Stop reason: HOSPADM

## 2017-06-17 RX ADMIN — KETOROLAC TROMETHAMINE 30 MG: 30 INJECTION INTRAMUSCULAR; INTRAVENOUS at 23:49

## 2017-06-17 RX ADMIN — ONDANSETRON 4 MG: 2 INJECTION, SOLUTION INTRAMUSCULAR; INTRAVENOUS at 23:52

## 2017-06-18 VITALS
WEIGHT: 182 LBS | TEMPERATURE: 98.3 F | RESPIRATION RATE: 16 BRPM | HEART RATE: 76 BPM | SYSTOLIC BLOOD PRESSURE: 153 MMHG | BODY MASS INDEX: 29.25 KG/M2 | HEIGHT: 66 IN | DIASTOLIC BLOOD PRESSURE: 94 MMHG | OXYGEN SATURATION: 98 %

## 2017-06-18 LAB
ALBUMIN SERPL-MCNC: 4 G/DL (ref 3.5–5.2)
ALBUMIN/GLOB SERPL: 1.7 G/DL
ALP SERPL-CCNC: 53 U/L (ref 40–129)
ALT SERPL W P-5'-P-CCNC: 11 U/L (ref 5–33)
ANION GAP SERPL CALCULATED.3IONS-SCNC: 13.7 MMOL/L
AST SERPL-CCNC: 12 U/L (ref 5–32)
BILIRUB SERPL-MCNC: <0.2 MG/DL (ref 0.2–1.2)
BUN BLD-MCNC: 7 MG/DL (ref 6–20)
BUN/CREAT SERPL: 8.9 (ref 7–25)
CALCIUM SPEC-SCNC: 9 MG/DL (ref 8.6–10.5)
CHLORIDE SERPL-SCNC: 104 MMOL/L (ref 98–107)
CO2 SERPL-SCNC: 22.3 MMOL/L (ref 22–29)
CREAT BLD-MCNC: 0.79 MG/DL (ref 0.57–1)
GFR SERPL CREATININE-BSD FRML MDRD: 83 ML/MIN/1.73
GLOBULIN UR ELPH-MCNC: 2.4 GM/DL
GLUCOSE BLD-MCNC: 97 MG/DL (ref 65–99)
POTASSIUM BLD-SCNC: 3.5 MMOL/L (ref 3.5–5.2)
PROT SERPL-MCNC: 6.4 G/DL (ref 6–8.5)
SODIUM BLD-SCNC: 140 MMOL/L (ref 136–145)

## 2017-06-18 NOTE — ED PROVIDER NOTES
"Subjective   Patient is a 36 y.o. female presenting with headaches.   History provided by:  Patient  Headache   Pain location:  Generalized  Radiates to:  Does not radiate  Onset quality:  Gradual  Duration: onset this morning.  Greater than 12 hours of duration.  Timing:  Constant  Chronicity:  Recurrent  Similar to prior headaches: yes    Context comment:  As described below.  Relieved by:  None tried  Worsened by:  Nothing  Ineffective treatments:  None tried  Associated symptoms: nausea and vomiting    Associated symptoms: no cough, no dizziness, no fever and no seizures    Associated symptoms comment:  Nose bleed x 3; LE muscle cramps.  Vomiting:     Number of occurrences:  1    HPI Narrative:Ms Dietz is a 35 yo WF who presents secondary to headache, nausea and vomiting, leg cramps and nosebleeds.  Patient reports she had onset of nausea last night.  Headache began this morning.  She states it is a \"migraine\".  However she describes a generalized headache.  No aura preceding the headache.  Vomiting ×1.  Patient also complains of muscle aches and leg cramps.  She has a history of hypokalemia.  Patient also reports nosebleeds ×3 today these resolve spontaneously.  Patient has not taken any pain relieving medication despite a headache for over 12 hours.  Patient has taken an antinausea medicine location without improvement.  Patient presents for evaluation.        Review of Systems   Constitutional: Negative.  Negative for appetite change, diaphoresis and fever.   Eyes: Negative.    Respiratory: Negative for cough, chest tightness, shortness of breath and wheezing.    Cardiovascular: Negative for chest pain, palpitations and leg swelling.   Gastrointestinal: Positive for nausea and vomiting.   Genitourinary: Negative.  Negative for difficulty urinating, flank pain, frequency and hematuria.   Musculoskeletal: Negative.    Skin: Negative.  Negative for color change, pallor and rash.   Neurological: Positive for " headaches. Negative for dizziness, seizures and syncope.   Psychiatric/Behavioral: Negative.  Negative for agitation, behavioral problems and hallucinations.       Past Medical History:   Diagnosis Date   • Anxiety    • Asthma    • Depression    • Endometriosis    • GERD (gastroesophageal reflux disease)    • Hiatal hernia    • Hiatal hernia    • Hyperlipidemia    • Hypertension    • Injury of back    • Leg pain    • Leukocytosis, likely related to tobacco use    • Liver cyst    • Melanoma     Superficial spreading lentiginous, right lower extremity   • Migraines        Allergies   Allergen Reactions   • Bactrim  [Sulfamethoxazole-Trimethoprim]    • Hydrocodone    • Lisinopril    • Meperidine    • Morphine    • Other Swelling     Erythromycin ophthalmic   • Pregabalin    • Tramadol        Past Surgical History:   Procedure Laterality Date   • APPENDECTOMY     •  SECTION     • CHOLECYSTECTOMY     • COLONOSCOPY     • HEMORRHOIDECTOMY     • HYSTEROSCOPY ENDOMETRIAL ABLATION     • OVARIAN CYST REMOVAL Left     Benign tumor   • OVARY SURGERY Left    • PELVIC LAPAROSCOPY     • SKIN BIOPSY     • SKIN CANCER EXCISION Right     Leg melanoma   • TUBAL ABDOMINAL LIGATION         Family History   Problem Relation Age of Onset   • Cervical cancer Mother 26   • Skin cancer Mother 54     Basal cell   • Diabetes Mother    • Skin cancer Father 53     Non-melanoma   • Heart disease Father    • Hypertension Father    • Other Daughter      Enlarged spleen   • Brain cancer Other 60   • Pancreatic cancer Other    • Diabetes Other    • Heart disease Other    • Hypertension Other        Social History     Social History   • Marital status:      Spouse name: N/A   • Number of children: N/A   • Years of education: N/A     Occupational History   •  Unemployed     Social History Main Topics   • Smoking status: Current Every Day Smoker     Packs/day: 0.50     Years: 10.00     Types: Cigarettes   • Smokeless tobacco: None   •  Alcohol use No   • Drug use: No   • Sexual activity: Not Asked     Other Topics Concern   • None     Social History Narrative   • None           Objective   Physical Exam   Constitutional: She is oriented to person, place, and time. She appears well-developed and well-nourished. No distress.   35 yo WF lying in bed. Overhead lights are on. Pt and spouse intently watching TV. Pt's gaze rarely moves from TV despite my questions and physical exam. Pt does not show any outward indication of HA or discomfort.    HENT:   Head: Normocephalic and atraumatic.   Right Ear: External ear normal.   Left Ear: External ear normal.   Nose: Nose normal.   Mouth/Throat: Oropharynx is clear and moist.   Eyes: Conjunctivae and EOM are normal. Pupils are equal, round, and reactive to light.   Neck: Normal range of motion. Neck supple.   Cardiovascular: Normal rate, regular rhythm, normal heart sounds and intact distal pulses.  Exam reveals no gallop and no friction rub.    No murmur heard.  Pulmonary/Chest: Effort normal and breath sounds normal.   Abdominal: Soft. Bowel sounds are normal. She exhibits no distension. There is no tenderness.   Musculoskeletal: Normal range of motion.   Neurological: She is alert and oriented to person, place, and time.   Skin: Skin is warm and dry. She is not diaphoretic.   Psychiatric: She has a normal mood and affect. Her behavior is normal.   Nursing note and vitals reviewed.      Procedures         ED Course  ED Course   Comment By Time   06/17/17  11:13 PM  Patient has a constellation of symptoms.  She also complains of a severe migraine headache.  She does this with the overhead lights on while she is watching TV.  I will obtain baseline lab work due to her history of electrolyte abnormalities and LFT abnormalities.  However do not feel narcotics are indicated for patient's headache.  She has taken nothing for her headache today. Sandro Wallis MD 06/17 6049   06/18/17  12:46 AM  Patient's CMP  is unremarkable.  Normal potassium as well as LFTs.  Perhaps patient has restless leg syndrome.  I do not find any evidence of significant disease process.  Will DC home. Sandro Wallis MD 06/18 0705      Labs Reviewed   COMPREHENSIVE METABOLIC PANEL - Abnormal; Notable for the following:        Result Value    Total Bilirubin <0.2 (*)     All other components within normal limits     My differential diagnosis for headache includes but is not limited to:  Migraine, cluster, ischemic stroke, subarachnoid hemorrhage, intracranial hemorrhage, vascular malformation, cerebral aneurysm, vascular dissection, vasculitis, temporal arteritis, malignant hypertension, pheochromocytoma, cerebral venous thrombosis, preeclampsia; bacterial meningitis, viral meningitis, fungal meningitis, encephalitis, brain abscess, pleural empyema, sinusitis, dental infection, influenza, viral syndrome; carbon monoxide exposure, analgesic abuse, hypoglycemia; trigeminal neuralgia, postherpetic neuralgia, occipital neuralgia; subdural hematoma, concussion, musculoskeletal tension, cervical osteoarthritis; glaucoma, TMJ disease, pseudotumor cerebri, post LP headache, intracranial neoplasm, sleep apnea              MDM  Number of Diagnoses or Management Options  Generalized headache: established and worsening  Muscle cramps: minor     Amount and/or Complexity of Data Reviewed  Clinical lab tests: ordered and reviewed    Risk of Complications, Morbidity, and/or Mortality  Presenting problems: low  Diagnostic procedures: low  Management options: low    Patient Progress  Patient progress: stable      Final diagnoses:   Generalized headache   Muscle cramps            Sandro Wallis MD  06/18/17 8994

## 2017-06-18 NOTE — DISCHARGE INSTRUCTIONS
Continue current medications.  Follow-up with PMD as above.  Return to ED for medical emergencies.    Hypertension  Hypertension, commonly called high blood pressure, is when the force of blood pumping through your arteries is too strong. Your arteries are the blood vessels that carry blood from your heart throughout your body. A blood pressure reading consists of a higher number over a lower number, such as 110/72. The higher number (systolic) is the pressure inside your arteries when your heart pumps. The lower number (diastolic) is the pressure inside your arteries when your heart relaxes. Ideally you want your blood pressure below 120/80.  Hypertension forces your heart to work harder to pump blood. Your arteries may become narrow or stiff. Having untreated or uncontrolled hypertension can cause heart attack, stroke, kidney disease, and other problems.  RISK FACTORS  Some risk factors for high blood pressure are controllable. Others are not.   Risk factors you cannot control include:   · Race. You may be at higher risk if you are .  · Age. Risk increases with age.  · Gender. Men are at higher risk than women before age 45 years. After age 65, women are at higher risk than men.  Risk factors you can control include:  · Not getting enough exercise or physical activity.  · Being overweight.  · Getting too much fat, sugar, calories, or salt in your diet.  · Drinking too much alcohol.  SIGNS AND SYMPTOMS  Hypertension does not usually cause signs or symptoms. Extremely high blood pressure (hypertensive crisis) may cause headache, anxiety, shortness of breath, and nosebleed.  DIAGNOSIS  To check if you have hypertension, your health care provider will measure your blood pressure while you are seated, with your arm held at the level of your heart. It should be measured at least twice using the same arm. Certain conditions can cause a difference in blood pressure between your right and left arms. A blood  pressure reading that is higher than normal on one occasion does not mean that you need treatment. If it is not clear whether you have high blood pressure, you may be asked to return on a different day to have your blood pressure checked again. Or, you may be asked to monitor your blood pressure at home for 1 or more weeks.  TREATMENT  Treating high blood pressure includes making lifestyle changes and possibly taking medicine. Living a healthy lifestyle can help lower high blood pressure. You may need to change some of your habits.  Lifestyle changes may include:  · Following the DASH diet. This diet is high in fruits, vegetables, and whole grains. It is low in salt, red meat, and added sugars.  · Keep your sodium intake below 2,300 mg per day.  · Getting at least 30-45 minutes of aerobic exercise at least 4 times per week.  · Losing weight if necessary.  · Not smoking.  · Limiting alcoholic beverages.  · Learning ways to reduce stress.  Your health care provider may prescribe medicine if lifestyle changes are not enough to get your blood pressure under control, and if one of the following is true:  · You are 18-59 years of age and your systolic blood pressure is above 140.  · You are 60 years of age or older, and your systolic blood pressure is above 150.  · Your diastolic blood pressure is above 90.  · You have diabetes, and your systolic blood pressure is over 140 or your diastolic blood pressure is over 90.  · You have kidney disease and your blood pressure is above 140/90.  · You have heart disease and your blood pressure is above 140/90.  Your personal target blood pressure may vary depending on your medical conditions, your age, and other factors.  HOME CARE INSTRUCTIONS  · Have your blood pressure rechecked as directed by your health care provider.    · Take medicines only as directed by your health care provider. Follow the directions carefully. Blood pressure medicines must be taken as prescribed. The  medicine does not work as well when you skip doses. Skipping doses also puts you at risk for problems.  · Do not smoke.    · Monitor your blood pressure at home as directed by your health care provider.   SEEK MEDICAL CARE IF:   · You think you are having a reaction to medicines taken.  · You have recurrent headaches or feel dizzy.  · You have swelling in your ankles.  · You have trouble with your vision.  SEEK IMMEDIATE MEDICAL CARE IF:  · You develop a severe headache or confusion.  · You have unusual weakness, numbness, or feel faint.  · You have severe chest or abdominal pain.  · You vomit repeatedly.  · You have trouble breathing.  MAKE SURE YOU:   · Understand these instructions.  · Will watch your condition.  · Will get help right away if you are not doing well or get worse.     This information is not intended to replace advice given to you by your health care provider. Make sure you discuss any questions you have with your health care provider.     Document Released: 12/18/2006 Document Revised: 05/03/2016 Document Reviewed: 10/10/2014  [employer] Patient Education ©2017 [employer]

## 2017-07-05 ENCOUNTER — HOSPITAL ENCOUNTER (EMERGENCY)
Facility: HOSPITAL | Age: 37
Discharge: HOME | End: 2017-07-05
Admitting: PHYSICIAN ASSISTANT

## 2017-07-05 VITALS
HEIGHT: 66 IN | BODY MASS INDEX: 29.25 KG/M2 | DIASTOLIC BLOOD PRESSURE: 91 MMHG | WEIGHT: 182 LBS | RESPIRATION RATE: 18 BRPM | SYSTOLIC BLOOD PRESSURE: 142 MMHG | TEMPERATURE: 98.3 F | HEART RATE: 76 BPM | OXYGEN SATURATION: 96 %

## 2017-07-05 DIAGNOSIS — J02.9 PHARYNGITIS, UNSPECIFIED ETIOLOGY: Primary | ICD-10-CM

## 2017-07-05 LAB — S PYO AG THROAT QL: NEGATIVE

## 2017-07-05 PROCEDURE — 87880 STREP A ASSAY W/OPTIC: CPT | Performed by: PHYSICIAN ASSISTANT

## 2017-07-05 PROCEDURE — 99283 EMERGENCY DEPT VISIT LOW MDM: CPT

## 2017-07-05 PROCEDURE — 87081 CULTURE SCREEN ONLY: CPT | Performed by: PHYSICIAN ASSISTANT

## 2017-07-05 PROCEDURE — 99282 EMERGENCY DEPT VISIT SF MDM: CPT | Performed by: PHYSICIAN ASSISTANT

## 2017-07-05 RX ORDER — IBUPROFEN 400 MG/1
400 TABLET ORAL EVERY 4 HOURS PRN
Status: DISCONTINUED | OUTPATIENT
Start: 2017-07-05 | End: 2017-07-06 | Stop reason: HOSPADM

## 2017-07-05 RX ADMIN — IBUPROFEN 400 MG: 400 TABLET ORAL at 21:36

## 2017-07-06 NOTE — ED NOTES
Went over discharge instructions with patient, alert and oriented at time of discharge, no questions at this time. Left ambulatory.       Carol Ann Girard RN  07/05/17 4005

## 2017-07-06 NOTE — ED PROVIDER NOTES
Subjective   History of Present Illness  History of Present Illness    Chief complaint: Sore throat    Location: Left greater than right throat    Quality/Severity:  Sharp, severe    Timing/Duration: One week, worse    Modifying Factors: Swallowing makes worse.  Nothing makes better.    Associated Symptoms: Denies any fevers.  Denies any headache or dizziness.  Denies any neck pain.  Denies any shortness of breath or chest pain    Narrative: 36-year-old female presents with sore throat ×1 week.  She states that she has a cyst in her throat that was first noted in January of this year.  She has seen ENT for this and was told it would be no problem.    Review of Systems  General: Denies fevers   Denies any weakness or fatigue.  Denies any weight loss or weight gain.  SKIN: Denies any rashes lesions or ulcers.  Denies color change.  ENT: + sore throat.  denies rhinorrhea.  Denies ear pain.    EYES: Denies any blurred vision.  Denies any change in vision.  Denies any photophobia.  Denies any vision loss.  LUNGS: Denies any shortness of breath or wheezing.  Denies any cough.  Denies any hemoptysis.  CARDIAC: Denies any chest pain.  Denies palpitations.  Denies syncope.  Denies any edema  ABD: Denies any abdominal pain.  Denies any nausea or vomiting or diarrhea.  Denies any rectal bleeding.  Denies constipation  : Denies any dysuria, urgency, frequency or hematuria.  Denies discharge.  Denies flank pain.  NEURO: Denies any weakness.  Denies headache.  Denies seizures.  Denies changes in speech or difficulty walking.  ENDOCRINE: Denies polydipsia and polyuria  M/S: Denies arthralgias, back pain, myalgias or neck pain  HEME/LYMPH: Negative for adenopathy. Does not bruise/bleed easily.   PSYCH: Negative for suicidal ideas. Denies anxiety or depression  A 10 system review was performed in addition to those in the above all other reviews are negative.      Past Medical History:   Diagnosis Date   • Anxiety    • Asthma    •  Depression    • Endometriosis    • GERD (gastroesophageal reflux disease)    • Hiatal hernia    • Hiatal hernia    • Hyperlipidemia    • Hypertension    • Injury of back    • Leg pain    • Leukocytosis, likely related to tobacco use    • Liver cyst    • Melanoma     Superficial spreading lentiginous, right lower extremity   • Migraines        Allergies   Allergen Reactions   • Bactrim  [Sulfamethoxazole-Trimethoprim]    • Hydrocodone    • Lisinopril    • Meperidine    • Morphine    • Other Swelling     Erythromycin ophthalmic   • Pregabalin    • Tramadol        Past Surgical History:   Procedure Laterality Date   • APPENDECTOMY     •  SECTION     • CHOLECYSTECTOMY     • COLONOSCOPY     • HEMORRHOIDECTOMY     • HYSTEROSCOPY ENDOMETRIAL ABLATION     • OVARIAN CYST REMOVAL Left     Benign tumor   • OVARY SURGERY Left    • PELVIC LAPAROSCOPY     • SKIN BIOPSY     • SKIN CANCER EXCISION Right     Leg melanoma   • TUBAL ABDOMINAL LIGATION         Family History   Problem Relation Age of Onset   • Cervical cancer Mother 26   • Skin cancer Mother 54     Basal cell   • Diabetes Mother    • Skin cancer Father 53     Non-melanoma   • Heart disease Father    • Hypertension Father    • Other Daughter      Enlarged spleen   • Brain cancer Other 60   • Pancreatic cancer Other    • Diabetes Other    • Heart disease Other    • Hypertension Other        Social History     Social History   • Marital status:      Spouse name: N/A   • Number of children: N/A   • Years of education: N/A     Occupational History   •  Unemployed     Social History Main Topics   • Smoking status: Current Every Day Smoker     Packs/day: 0.50     Years: 10.00     Types: Cigarettes   • Smokeless tobacco: None   • Alcohol use No   • Drug use: No   • Sexual activity: Not Asked     Other Topics Concern   • None     Social History Narrative     No current facility-administered medications on file prior to encounter.      Current Outpatient  Prescriptions on File Prior to Encounter   Medication Sig Dispense Refill   • citalopram (CeleXA) 40 MG tablet Take  by mouth.     • colesevelam (WELCHOL) 625 MG tablet Take  by mouth 2 (two) times a day.     • dicyclomine (BENTYL) 20 MG tablet Take one (1) tablet orally (by mouth) four times daily 120 tablet 0   • Ergocalciferol (VITAMIN D2 PO) Take  by mouth.     • gabapentin (NEURONTIN) 800 MG tablet Take  by mouth 4 (four) times a day.     • hydrOXYzine (VISTARIL) 25 MG capsule Take 1 capsule by mouth 3 (Three) Times a Day As Needed for Itching for up to 30 doses. 2 tabs if needed 30 capsule 0   • methocarbamol (ROBAXIN) 500 MG tablet Take 500 mg by mouth 3 (three) times a day.     • omeprazole (PriLOSEC) 20 MG capsule Take 40 mg by mouth 2 (Two) Times a Day.     • sertraline (ZOLOFT) 100 MG tablet Take 100 mg by mouth Daily.     • sucralfate (CARAFATE) 1 G tablet Take  by mouth.     • albuterol (VENTOLIN HFA) 108 (90 BASE) MCG/ACT inhaler Ventolin  (90 Base) MCG/ACT Inhalation Aerosol Solution; Patient Sig: Ventolin  (90 Base) MCG/ACT Inhalation Aerosol Solution ; 0; 03-Mar-2015; Active     • azithromycin (ZITHROMAX Z-LISBETH) 250 MG tablet Take 2 tablets the first day, then 1 tablet daily for 4 days. 6 tablet 0   • benzonatate (TESSALON) 200 MG capsule Take 1 capsule by mouth 3 (Three) Times a Day As Needed for cough for up to 30 doses. 30 capsule 0   • busPIRone (BUSPAR) 15 MG tablet Take  by mouth 3 (three) times a day.     • diphenoxylate-atropine (LOMOTIL) 2.5-0.025 MG per tablet Take 2 tablets by mouth 4 (four) times a day as needed for diarrhea. 20 tablet 0   • oxyCODONE-acetaminophen (PERCOCET) 5-325 MG per tablet Take  by mouth.     • predniSONE (DELTASONE) 10 MG tablet 6 tabs by mouth day 1, 5 tabs by mouth day 2, continue tapering one tablet daily: Coarse 6 days. 21 tablet 0   • promethazine (PHENERGAN) 25 MG tablet Take 1 tablet by mouth every 6 (six) hours as needed for nausea or vomiting  for up to 10 doses. 10 tablet 0   • traZODone (DESYREL) 100 MG tablet Take 100 mg by mouth As Needed for sleep.     • UNKNOWN TO PATIENT Meds for my legs that hurt all the time     • VITAMIN B COMPLEX-C capsule Take  by mouth.               Objective   Physical Exam  Vitals:    07/05/17 2041   BP: 137/90   Pulse: 84   Resp: 18   Temp: 98.3 °F (36.8 °C)   SpO2: 98%   GENERAL: Alert and oriented ×4.  No apparent distress.  SKIN: Warm, pink and dry  HEENT: Atraumatic normocephalic, TMs with ear tubes bilat, pharyngeal edema with tonsilar cyst on left. + left submandibular adenopathy  LUNGS: Clear to auscultation bilaterally without wheezes, rales or rhonchi  CARDIAC: Regular rate and rhythm.  S1 and S2.  No murmurs, rubs or gallops.  ABD: Soft, nontender  M/S: MAEW, no deformity  PSYCH: Normal mood and affect      Procedures         ED Course  ED Course      Motrin 400 mg given.  Results for orders placed or performed during the hospital encounter of 07/05/17   Rapid Strep A Screen   Result Value Ref Range    Strep A Ag Negative Negative                 MDM  Number of Diagnoses or Management Options  Pharyngitis, unspecified etiology: new and requires workup     Amount and/or Complexity of Data Reviewed  Clinical lab tests: reviewed and ordered    Risk of Complications, Morbidity, and/or Mortality  Presenting problems: low  Diagnostic procedures: low  Management options: low    Patient Progress  Patient progress: improved      Final diagnoses:   Pharyngitis, unspecified etiology            Lucy Barreto PA-C  07/05/17 3891

## 2017-07-06 NOTE — DISCHARGE INSTRUCTIONS
Return to the emergency department with worsening symptoms, uncontrolled pain, inability to tolerate oral liquids, fever greater than 101°F not controlled by Tylenol  or as needed with emergent concerns.

## 2017-07-07 LAB — BACTERIA SPEC AEROBE CULT: NORMAL

## 2017-07-21 ENCOUNTER — HOSPITAL ENCOUNTER (EMERGENCY)
Facility: HOSPITAL | Age: 37
Discharge: HOME | End: 2017-07-21
Attending: EMERGENCY MEDICINE | Admitting: EMERGENCY MEDICINE

## 2017-07-21 VITALS
WEIGHT: 198 LBS | SYSTOLIC BLOOD PRESSURE: 165 MMHG | RESPIRATION RATE: 16 BRPM | HEIGHT: 66 IN | HEART RATE: 79 BPM | TEMPERATURE: 97.9 F | DIASTOLIC BLOOD PRESSURE: 98 MMHG | BODY MASS INDEX: 31.82 KG/M2 | OXYGEN SATURATION: 95 %

## 2017-07-21 DIAGNOSIS — M26.629 TMJ SYNDROME: Primary | ICD-10-CM

## 2017-07-21 PROCEDURE — 99283 EMERGENCY DEPT VISIT LOW MDM: CPT

## 2017-07-21 PROCEDURE — 63710000001 PREDNISONE PER 1 MG: Performed by: EMERGENCY MEDICINE

## 2017-07-21 PROCEDURE — 99284 EMERGENCY DEPT VISIT MOD MDM: CPT | Performed by: EMERGENCY MEDICINE

## 2017-07-21 RX ORDER — OXYCODONE HYDROCHLORIDE AND ACETAMINOPHEN 5; 325 MG/1; MG/1
1 TABLET ORAL ONCE
Status: COMPLETED | OUTPATIENT
Start: 2017-07-21 | End: 2017-07-21

## 2017-07-21 RX ORDER — OXYCODONE HYDROCHLORIDE AND ACETAMINOPHEN 5; 325 MG/1; MG/1
1 TABLET ORAL EVERY 6 HOURS PRN
Qty: 12 TABLET | Refills: 0 | Status: SHIPPED | OUTPATIENT
Start: 2017-07-21 | End: 2017-08-15

## 2017-07-21 RX ORDER — PREDNISONE 20 MG/1
60 TABLET ORAL ONCE
Status: COMPLETED | OUTPATIENT
Start: 2017-07-21 | End: 2017-07-21

## 2017-07-21 RX ORDER — PREDNISONE 20 MG/1
20 TABLET ORAL 3 TIMES DAILY
Qty: 15 TABLET | Refills: 0 | Status: SHIPPED | OUTPATIENT
Start: 2017-07-21 | End: 2017-08-15

## 2017-07-21 RX ADMIN — PREDNISONE 60 MG: 20 TABLET ORAL at 23:41

## 2017-07-21 RX ADMIN — OXYCODONE HYDROCHLORIDE AND ACETAMINOPHEN 1 TABLET: 5; 325 TABLET ORAL at 23:41

## 2017-07-22 NOTE — ED PROVIDER NOTES
Subjective   History of Present Illness  History of Present Illness  It should be noted that this chart is a re-creation of a chart that was permanently erased by [facility] and may not be 100% accurate as I am forced to dictate this chart in a non-contemporaneous fashion.    History of Present Illness    Chief complaint: Jaw pain    Location: Right mandible    Quality/Severity:  Aching and moderate intensity    Timing/Duration: Symptoms started yesterday and have been progressively worse    Modifying Factors: Patient did see a dentist approximately 1 week ago with a dental extraction    Associated Symptoms: Crepitus with jaw movement    Narrative: The patient is a 36-year-old white female who presents as noted above.  The patient does have 1 prior history of right TMJ syndrome 6 years ago.  Current episode similar to that in the past.  The patient was seen by a dentist proximally 1 week ago with a dental extraction.  Pain began as mild yesterday and have increased in intensity.  Some crepitus with mandibular movement noted.    Review of Systems   Constitutional: Negative for fever.   HENT: Negative for dental problem, facial swelling and trouble swallowing.         Right TMJ pain with crepitus   All other systems reviewed and are negative.      Past Medical History:   Diagnosis Date   • Anxiety    • Asthma    • Depression    • Endometriosis    • GERD (gastroesophageal reflux disease)    • Hiatal hernia    • Hiatal hernia    • Hyperlipidemia    • Hypertension    • Injury of back    • Leg pain    • Leukocytosis, likely related to tobacco use    • Liver cyst    • Melanoma     Superficial spreading lentiginous, right lower extremity   • Migraines        Allergies   Allergen Reactions   • Bactrim  [Sulfamethoxazole-Trimethoprim]    • Hydrocodone    • Lisinopril    • Meperidine    • Morphine    • Other Swelling     Erythromycin ophthalmic   • Pregabalin    • Tramadol        Past Surgical History:   Procedure Laterality  Date   • APPENDECTOMY     •  SECTION     • CHOLECYSTECTOMY     • COLONOSCOPY     • HEMORRHOIDECTOMY     • HYSTEROSCOPY ENDOMETRIAL ABLATION     • OVARIAN CYST REMOVAL Left     Benign tumor   • OVARY SURGERY Left    • PELVIC LAPAROSCOPY     • SKIN BIOPSY     • SKIN CANCER EXCISION Right     Leg melanoma   • TUBAL ABDOMINAL LIGATION         Family History   Problem Relation Age of Onset   • Cervical cancer Mother 26   • Skin cancer Mother 54     Basal cell   • Diabetes Mother    • Skin cancer Father 53     Non-melanoma   • Heart disease Father    • Hypertension Father    • Other Daughter      Enlarged spleen   • Brain cancer Other 60   • Pancreatic cancer Other    • Diabetes Other    • Heart disease Other    • Hypertension Other        Social History     Social History   • Marital status:      Spouse name: N/A   • Number of children: N/A   • Years of education: N/A     Occupational History   •  Unemployed     Social History Main Topics   • Smoking status: Current Every Day Smoker     Packs/day: 0.50     Years: 10.00     Types: Cigarettes   • Smokeless tobacco: None   • Alcohol use No   • Drug use: No   • Sexual activity: Defer     Other Topics Concern   • None     Social History Narrative   • None           Objective   Physical Exam   Constitutional: She is oriented to person, place, and time. She appears well-developed and well-nourished.   The patient is a healthy-appearing 36-year-old white female who is noted to have little mandibular movement with speech.   HENT:   Head: Normocephalic and atraumatic.   Slight crepitus noted over the right TMJ with movement and the patient does elicit discomfort with movement also.   Eyes: Conjunctivae and EOM are normal.   Neck: Normal range of motion. Neck supple.   Lymphadenopathy:     She has no cervical adenopathy.   Neurological: She is alert and oriented to person, place, and time.   Nursing note and vitals reviewed.      Procedures         ED Course  ED  Course   Comment By Time   TMJ syndrome discussed at length with patient and encouraged to follow-up with her dentist.  Treatment plan, expectations and warnings discussed. Campbell Burton MD 07/22 6790                  MDM  Number of Diagnoses or Management Options  TMJ syndrome: new and requires workup  Risk of Complications, Morbidity, and/or Mortality  Presenting problems: moderate  Management options: high      Labs this visit  Lab Results (last 24 hours)     ** No results found for the last 24 hours. **        Prescribed on discharge             Medication List      Changed          oxyCODONE-acetaminophen 5-325 MG per tablet   Commonly known as:  PERCOCET   Take 1 tablet by mouth Every 6 (Six) Hours As Needed for Moderate Pain   (4-6) or Severe Pain  (7-10) for up to 12 doses.   What changed:    - how much to take  - when to take this  - reasons to take this       predniSONE 20 MG tablet   Commonly known as:  DELTASONE   Take 1 tablet by mouth 3 (Three) Times a Day.   What changed:    - medication strength  - how much to take  - how to take this  - when to take this  - additional instructions         Stop          azithromycin 250 MG tablet   Commonly known as:  ZITHROMAX Z-LISBETH       benzonatate 200 MG capsule   Commonly known as:  TESSALON       busPIRone 15 MG tablet   Commonly known as:  BUSPAR       VITAMIN B COMPLEX-C capsule       WELCHOL 625 MG tablet   Generic drug:  colesevelam           All lab results, imaging results and other tests were reviewed by Campbell Burton MD and unless otherwise specified were found to be unremarkable.      Final diagnoses:   TMJ syndrome            Campbell Burton MD  07/22/17 1529

## 2017-07-23 ENCOUNTER — HOSPITAL ENCOUNTER (EMERGENCY)
Facility: HOSPITAL | Age: 37
Discharge: HOME | End: 2017-07-23
Attending: EMERGENCY MEDICINE | Admitting: EMERGENCY MEDICINE

## 2017-07-23 VITALS
HEIGHT: 66 IN | RESPIRATION RATE: 16 BRPM | DIASTOLIC BLOOD PRESSURE: 84 MMHG | WEIGHT: 198 LBS | HEART RATE: 92 BPM | BODY MASS INDEX: 31.82 KG/M2 | SYSTOLIC BLOOD PRESSURE: 132 MMHG | OXYGEN SATURATION: 99 % | TEMPERATURE: 98.3 F

## 2017-07-23 DIAGNOSIS — S03.00XA TMJ (DISLOCATION OF TEMPOROMANDIBULAR JOINT), INITIAL ENCOUNTER: Primary | ICD-10-CM

## 2017-07-23 DIAGNOSIS — K29.60 OTHER GASTRITIS WITHOUT HEMORRHAGE, UNSPECIFIED CHRONICITY: ICD-10-CM

## 2017-07-23 PROCEDURE — 99284 EMERGENCY DEPT VISIT MOD MDM: CPT | Performed by: EMERGENCY MEDICINE

## 2017-07-23 PROCEDURE — 25010000002 HYDROMORPHONE PER 4 MG: Performed by: EMERGENCY MEDICINE

## 2017-07-23 PROCEDURE — 99283 EMERGENCY DEPT VISIT LOW MDM: CPT

## 2017-07-23 PROCEDURE — 96372 THER/PROPH/DIAG INJ SC/IM: CPT

## 2017-07-23 RX ORDER — DICYCLOMINE HYDROCHLORIDE 10 MG/1
20 CAPSULE ORAL ONCE
Status: COMPLETED | OUTPATIENT
Start: 2017-07-23 | End: 2017-07-23

## 2017-07-23 RX ORDER — MAGNESIUM HYDROXIDE/ALUMINUM HYDROXICE/SIMETHICONE 120; 1200; 1200 MG/30ML; MG/30ML; MG/30ML
30 SUSPENSION ORAL ONCE
Status: DISCONTINUED | OUTPATIENT
Start: 2017-07-23 | End: 2017-07-23 | Stop reason: HOSPADM

## 2017-07-23 RX ORDER — ALUMINA, MAGNESIA, AND SIMETHICONE 2400; 2400; 240 MG/30ML; MG/30ML; MG/30ML
SUSPENSION ORAL
Status: COMPLETED
Start: 2017-07-23 | End: 2017-07-23

## 2017-07-23 RX ORDER — ONDANSETRON 4 MG/1
8 TABLET, ORALLY DISINTEGRATING ORAL ONCE
Status: COMPLETED | OUTPATIENT
Start: 2017-07-23 | End: 2017-07-23

## 2017-07-23 RX ADMIN — DICYCLOMINE HYDROCHLORIDE 20 MG: 10 CAPSULE ORAL at 20:20

## 2017-07-23 RX ADMIN — LIDOCAINE HYDROCHLORIDE 5 ML: 20 SOLUTION ORAL; TOPICAL at 19:49

## 2017-07-23 RX ADMIN — ALUMINUM HYDROXIDE, MAGNESIUM HYDROXIDE, AND DIMETHICONE: 400; 400; 40 SUSPENSION ORAL at 19:49

## 2017-07-23 RX ADMIN — HYDROMORPHONE HYDROCHLORIDE 1 MG: 1 INJECTION, SOLUTION INTRAMUSCULAR; INTRAVENOUS; SUBCUTANEOUS at 18:56

## 2017-07-23 RX ADMIN — ONDANSETRON 8 MG: 4 TABLET, ORALLY DISINTEGRATING ORAL at 18:56

## 2017-07-23 NOTE — ED PROVIDER NOTES
Subjective   History of Present Illness  History of Present Illness    Chief complaint: Right jaw pain    Location: Right jaw    Quality/Severity:  Moderate to severe, sharp    Timing/Onset/Duration: Gradual onset over the last 2 days    Modifying Factors: The patient was seen here Friday night and given a prescription for Percocet and a Medrol Dosepak and is not getting enough relief.  It hurts to open and close her mouth, feels better to keep her mouth shut    Associated Symptoms: No fever or chills.  No chest pain or shortness of breath.  No difficulty swallowing or speaking.    Narrative: This 36-year-old white female with history of TMJ presents complaining of right-sided jaw pain that has been getting worse for the last 2 days.  The patient was seen here on Friday night and prescribed prednisone and given a prescription for Percocet.  The patient states that she's not getting enough control of her pain.  The patient has a dentist, Dr. shaw.  She has an appointment set with him.  The patient denies any fever or chills.  No chest pain or shortness of breath.  No difficulty swallowing or speaking.    PCP:Nolan    Dentist:  [facility]      Review of Systems   Constitutional: Negative for chills and fever.   HENT: Negative for facial swelling and trouble swallowing.         Pain in right TMJ   Respiratory: Negative for shortness of breath.    Neurological: Negative for weakness, numbness and headaches.        Medication List      ASK your doctor about these medications          azithromycin 250 MG tablet   Commonly known as:  ZITHROMAX Z-LISBETH   Take 2 tablets the first day, then 1 tablet daily for 4 days.       benzonatate 200 MG capsule   Commonly known as:  TESSALON   Take 1 capsule by mouth 3 (Three) Times a Day As Needed for cough for up   to 30 doses.       busPIRone 15 MG tablet   Commonly known as:  BUSPAR       citalopram 40 MG tablet   Commonly known as:  CeleXA       dicyclomine 20 MG tablet   Commonly  known as:  BENTYL   Take one (1) tablet orally (by mouth) four times daily       diphenoxylate-atropine 2.5-0.025 MG per tablet   Commonly known as:  LOMOTIL   Take 2 tablets by mouth 4 (four) times a day as needed for diarrhea.       gabapentin 800 MG tablet   Commonly known as:  NEURONTIN       hydrOXYzine 25 MG capsule   Commonly known as:  VISTARIL   Take 1 capsule by mouth 3 (Three) Times a Day As Needed for Itching for up   to 30 doses. 2 tabs if needed       methocarbamol 500 MG tablet   Commonly known as:  ROBAXIN       omeprazole 20 MG capsule   Commonly known as:  priLOSEC       * PERCOCET 5-325 MG per tablet   Generic drug:  oxyCODONE-acetaminophen       * oxyCODONE-acetaminophen 5-325 MG per tablet   Commonly known as:  PERCOCET   Take 1 tablet by mouth Every 6 (Six) Hours As Needed for Moderate Pain   (4-6) or Severe Pain  (7-10) for up to 12 doses.       * predniSONE 10 MG tablet   Commonly known as:  DELTASONE   6 tabs by mouth day 1, 5 tabs by mouth day 2, continue tapering one tablet   daily: Coarse 6 days.       * predniSONE 20 MG tablet   Commonly known as:  DELTASONE   Take 1 tablet by mouth 3 (Three) Times a Day.       promethazine 25 MG tablet   Commonly known as:  PHENERGAN   Take 1 tablet by mouth every 6 (six) hours as needed for nausea or   vomiting for up to 10 doses.       sertraline 100 MG tablet   Commonly known as:  ZOLOFT       sucralfate 1 G tablet   Commonly known as:  CARAFATE       traZODone 100 MG tablet   Commonly known as:  DESYREL       UNKNOWN TO PATIENT       VENTOLIN  (90 BASE) MCG/ACT inhaler   Generic drug:  albuterol       VITAMIN B COMPLEX-C capsule       VITAMIN D2 PO       WELCHOL 625 MG tablet   Generic drug:  colesevelam       * Notice:  This list has 4 medication(s) that are the same as other   medications prescribed for you. Read the directions carefully, and ask   your doctor or other care provider to review them with you.        Past Medical History:    Diagnosis Date   • Anxiety    • Asthma    • Depression    • Endometriosis    • GERD (gastroesophageal reflux disease)    • Hiatal hernia    • Hiatal hernia    • Hyperlipidemia    • Hypertension    • Injury of back    • Leg pain    • Leukocytosis, likely related to tobacco use    • Liver cyst    • Melanoma     Superficial spreading lentiginous, right lower extremity   • Migraines        Allergies   Allergen Reactions   • Bactrim  [Sulfamethoxazole-Trimethoprim]    • Hydrocodone    • Lisinopril    • Meperidine    • Morphine    • Other Swelling     Erythromycin ophthalmic   • Pregabalin    • Tramadol        Past Surgical History:   Procedure Laterality Date   • APPENDECTOMY     •  SECTION     • CHOLECYSTECTOMY     • COLONOSCOPY     • HEMORRHOIDECTOMY     • HYSTEROSCOPY ENDOMETRIAL ABLATION     • OVARIAN CYST REMOVAL Left     Benign tumor   • OVARY SURGERY Left    • PELVIC LAPAROSCOPY     • SKIN BIOPSY     • SKIN CANCER EXCISION Right     Leg melanoma   • TUBAL ABDOMINAL LIGATION         Family History   Problem Relation Age of Onset   • Cervical cancer Mother 26   • Skin cancer Mother 54     Basal cell   • Diabetes Mother    • Skin cancer Father 53     Non-melanoma   • Heart disease Father    • Hypertension Father    • Other Daughter      Enlarged spleen   • Brain cancer Other 60   • Pancreatic cancer Other    • Diabetes Other    • Heart disease Other    • Hypertension Other        Social History     Social History   • Marital status:      Spouse name: N/A   • Number of children: N/A   • Years of education: N/A     Occupational History   •  Unemployed     Social History Main Topics   • Smoking status: Current Every Day Smoker     Packs/day: 0.50     Years: 10.00     Types: Cigarettes   • Smokeless tobacco: None   • Alcohol use No   • Drug use: No   • Sexual activity: Defer     Other Topics Concern   • None     Social History Narrative           Objective   Physical Exam   Constitutional: She is  oriented to person, place, and time. She appears well-developed and well-nourished. She appears distressed.   ED Triage Vitals:  Temp: 98.3 °F (36.8 °C) (07/23/17 1719)  Heart Rate: 119 (07/23/17 1719)  Resp: 20 (07/23/17 1719)  BP: 143/91 (07/23/17 1719)  SpO2: 99 % (07/23/17 1719)  Temp src: Oral (07/23/17 1719)  Heart Rate Source: n/a  Patient Position: n/a  BP Location: Right arm (07/23/17 1719)  FiO2 (%): n/a    The patient's vitals were reviewed by me.  Unless otherwise noted they are within normal limits.  The patient is tachycardic with heart rate of 119 but in severe amount of pain.     HENT:   Head: Normocephalic and atraumatic.   Right Ear: External ear normal.   Left Ear: External ear normal.   Nose: Nose normal.   Mouth/Throat: Oropharynx is clear and moist.   There is right TMJ pain of moderate intensity   Neck: Normal range of motion. No JVD present. No tracheal deviation present. No thyromegaly present.   Cardiovascular: Regular rhythm, normal heart sounds and intact distal pulses.  Exam reveals no gallop and no friction rub.    No murmur heard.  Pulmonary/Chest: Effort normal and breath sounds normal. No stridor. No respiratory distress. She has no wheezes. She has no rales. She exhibits no tenderness.   Abdominal: Soft. Bowel sounds are normal.   Lymphadenopathy:     She has no cervical adenopathy.   Neurological: She is alert and oriented to person, place, and time. No cranial nerve deficit.   Skin: Skin is warm. No rash noted. No erythema. No pallor.   Psychiatric: Her behavior is normal.   Nursing note and vitals reviewed.      Procedures         ED Course  ED Course      6:35 PM, 07/23/17:  The [facility] report was reviewed by me.  The report number is [id number].    8:08 PM, 07/23/17:  Patient was reassessed.  She feels better.  She does complain of some abdominal pain related to a hiatal hernia that she has.  She has been given a GI cocktail with improvement.    8:08 PM, 07/23/17:  The patient's  diagnosis of TMJ was discussed with her.  She should continue her pain medication.  She is on a Medrol Dosepak and I will prescribe her Carafate and H2 blocker.  Patient should follow-up with Dr. Gan her primary care provider this coming week to assess how she is doing with her TMJ and abdominal pain.  She should follow-up with her dentist for her TMJ this week as well.  All the patient's questions were answered patient be discharged in good condition.  The patient denies any black stools.            [facility]  No orders to display     Labs Reviewed - No data to display  No results found.    Final diagnoses:   None         ED Medications:  Medications   HYDROmorphone (DILAUDID) injection 1 mg (not administered)   ondansetron ODT (ZOFRAN-ODT) disintegrating tablet 8 mg (not administered)       New Medications:     Medication List      ASK your doctor about these medications          azithromycin 250 MG tablet   Commonly known as:  ZITHROMAX Z-LISBETH   Take 2 tablets the first day, then 1 tablet daily for 4 days.       benzonatate 200 MG capsule   Commonly known as:  TESSALON   Take 1 capsule by mouth 3 (Three) Times a Day As Needed for cough for up   to 30 doses.       busPIRone 15 MG tablet   Commonly known as:  BUSPAR       citalopram 40 MG tablet   Commonly known as:  CeleXA       dicyclomine 20 MG tablet   Commonly known as:  BENTYL   Take one (1) tablet orally (by mouth) four times daily       diphenoxylate-atropine 2.5-0.025 MG per tablet   Commonly known as:  LOMOTIL   Take 2 tablets by mouth 4 (four) times a day as needed for diarrhea.       gabapentin 800 MG tablet   Commonly known as:  NEURONTIN       hydrOXYzine 25 MG capsule   Commonly known as:  VISTARIL   Take 1 capsule by mouth 3 (Three) Times a Day As Needed for Itching for up   to 30 doses. 2 tabs if needed       methocarbamol 500 MG tablet   Commonly known as:  ROBAXIN       omeprazole 20 MG capsule   Commonly known as:  priLOSEC       * PERCOCET 5-325  MG per tablet   Generic drug:  oxyCODONE-acetaminophen       * oxyCODONE-acetaminophen 5-325 MG per tablet   Commonly known as:  PERCOCET   Take 1 tablet by mouth Every 6 (Six) Hours As Needed for Moderate Pain   (4-6) or Severe Pain  (7-10) for up to 12 doses.       * predniSONE 10 MG tablet   Commonly known as:  DELTASONE   6 tabs by mouth day 1, 5 tabs by mouth day 2, continue tapering one tablet   daily: Coarse 6 days.       * predniSONE 20 MG tablet   Commonly known as:  DELTASONE   Take 1 tablet by mouth 3 (Three) Times a Day.       promethazine 25 MG tablet   Commonly known as:  PHENERGAN   Take 1 tablet by mouth every 6 (six) hours as needed for nausea or   vomiting for up to 10 doses.       sertraline 100 MG tablet   Commonly known as:  ZOLOFT       sucralfate 1 G tablet   Commonly known as:  CARAFATE       traZODone 100 MG tablet   Commonly known as:  DESYREL       UNKNOWN TO PATIENT       VENTOLIN  (90 BASE) MCG/ACT inhaler   Generic drug:  albuterol       VITAMIN B COMPLEX-C capsule       VITAMIN D2 PO       WELCHOL 625 MG tablet   Generic drug:  colesevelam       * Notice:  This list has 4 medication(s) that are the same as other   medications prescribed for you. Read the directions carefully, and ask   your doctor or other care provider to review them with you.        Stopped Medications:     Medication List      ASK your doctor about these medications          azithromycin 250 MG tablet   Commonly known as:  ZITHROMAX Z-LISBETH   Take 2 tablets the first day, then 1 tablet daily for 4 days.       benzonatate 200 MG capsule   Commonly known as:  TESSALON   Take 1 capsule by mouth 3 (Three) Times a Day As Needed for cough for up   to 30 doses.       busPIRone 15 MG tablet   Commonly known as:  BUSPAR       citalopram 40 MG tablet   Commonly known as:  CeleXA       dicyclomine 20 MG tablet   Commonly known as:  BENTYL   Take one (1) tablet orally (by mouth) four times daily        diphenoxylate-atropine 2.5-0.025 MG per tablet   Commonly known as:  LOMOTIL   Take 2 tablets by mouth 4 (four) times a day as needed for diarrhea.       gabapentin 800 MG tablet   Commonly known as:  NEURONTIN       hydrOXYzine 25 MG capsule   Commonly known as:  VISTARIL   Take 1 capsule by mouth 3 (Three) Times a Day As Needed for Itching for up   to 30 doses. 2 tabs if needed       methocarbamol 500 MG tablet   Commonly known as:  ROBAXIN       omeprazole 20 MG capsule   Commonly known as:  priLOSEC       * PERCOCET 5-325 MG per tablet   Generic drug:  oxyCODONE-acetaminophen       * oxyCODONE-acetaminophen 5-325 MG per tablet   Commonly known as:  PERCOCET   Take 1 tablet by mouth Every 6 (Six) Hours As Needed for Moderate Pain   (4-6) or Severe Pain  (7-10) for up to 12 doses.       * predniSONE 10 MG tablet   Commonly known as:  DELTASONE   6 tabs by mouth day 1, 5 tabs by mouth day 2, continue tapering one tablet   daily: Coarse 6 days.       * predniSONE 20 MG tablet   Commonly known as:  DELTASONE   Take 1 tablet by mouth 3 (Three) Times a Day.       promethazine 25 MG tablet   Commonly known as:  PHENERGAN   Take 1 tablet by mouth every 6 (six) hours as needed for nausea or   vomiting for up to 10 doses.       sertraline 100 MG tablet   Commonly known as:  ZOLOFT       sucralfate 1 G tablet   Commonly known as:  CARAFATE       traZODone 100 MG tablet   Commonly known as:  DESYREL       UNKNOWN TO PATIENT       VENTOLIN  (90 BASE) MCG/ACT inhaler   Generic drug:  albuterol       VITAMIN B COMPLEX-C capsule       VITAMIN D2 PO       WELCHOL 625 MG tablet   Generic drug:  colesevelam       * Notice:  This list has 4 medication(s) that are the same as other   medications prescribed for you. Read the directions carefully, and ask   your doctor or other care provider to review them with you.          Final diagnoses:   TMJ (dislocation of temporomandibular joint), initial encounter   Other gastritis  without hemorrhage, unspecified chronicity            Evan Villalpando MD  07/23/17 2015

## 2017-07-24 NOTE — DISCHARGE INSTRUCTIONS
Follow-up with Dr. elise this week for your TMJ.  Follow-up with Dr. Francisco this week for your stomach pain.  Return to the emergency department if there is worsening pain, vomiting, vomiting blood, change in the color her stools, fever, worse in any way at all.

## 2017-08-11 ENCOUNTER — TELEPHONE (OUTPATIENT)
Dept: GENERAL RADIOLOGY | Facility: HOSPITAL | Age: 37
End: 2017-08-11

## 2017-08-11 ENCOUNTER — HOSPITAL ENCOUNTER (EMERGENCY)
Facility: HOSPITAL | Age: 37
Discharge: HOME | End: 2017-08-11
Attending: EMERGENCY MEDICINE | Admitting: EMERGENCY MEDICINE

## 2017-08-11 VITALS
HEIGHT: 66 IN | RESPIRATION RATE: 20 BRPM | BODY MASS INDEX: 31.82 KG/M2 | DIASTOLIC BLOOD PRESSURE: 82 MMHG | HEART RATE: 84 BPM | TEMPERATURE: 98.2 F | SYSTOLIC BLOOD PRESSURE: 135 MMHG | WEIGHT: 198 LBS | OXYGEN SATURATION: 98 %

## 2017-08-11 DIAGNOSIS — M26.621 ARTHRALGIA OF RIGHT TEMPOROMANDIBULAR JOINT: Primary | ICD-10-CM

## 2017-08-11 PROCEDURE — 99282 EMERGENCY DEPT VISIT SF MDM: CPT | Performed by: EMERGENCY MEDICINE

## 2017-08-11 PROCEDURE — 99283 EMERGENCY DEPT VISIT LOW MDM: CPT

## 2017-08-11 RX ORDER — DICLOFENAC SODIUM 75 MG/1
75 TABLET, DELAYED RELEASE ORAL 2 TIMES DAILY PRN
Qty: 20 TABLET | Refills: 0 | Status: SHIPPED | OUTPATIENT
Start: 2017-08-11 | End: 2017-08-15

## 2017-08-11 NOTE — TELEPHONE ENCOUNTER
----- Message from Kourtney Umanzor RN sent at 8/11/2017  8:48 AM EDT -----  Patient is due for her yearly appt.  Please call and schedule.

## 2017-08-12 NOTE — ED PROVIDER NOTES
Subjective     History provided by:  Patient    History of Present Illness    · Chief complaint: TMJ pain    · Location: Right side of the jaw as well as pain in the right ear.    · Quality/Severity: A soreness severe pain in the TMJ with right ear pain and decreased hearing in the right ear    · Timing/Onset: The patient states she's had problems with TMJ pain since age 16.  This particular episode is been present for over 2 months.  She's been evaluated by her dentist Dr. elise and once here last month.    · Modifying Factors: Movement of her jaw exacerbates pain.    · Associated symptoms: She denies any fever and denies any pain on the left side.    · Narrative: The patient is a 36-year-old white female complaining of pain in her right TMJ for the past 2 months.  She states she's had problems with pain in this area since age 16.  Also complains of pain in her right ear with decreased hearing.  She is seeing her dentist Dr. elise who is attempting to refer her to a TMJ specialist.  She is status post tympanic membrane tubes placed over a year ago by an ENT that she can't remember the name of.    ED Triage Vitals   Temp Heart Rate Resp BP SpO2   08/11/17 2117 08/11/17 2117 08/11/17 2117 08/11/17 2117 08/11/17 2117   98.2 °F (36.8 °C) 84 20 135/82 98 %      Temp src Heart Rate Source Patient Position BP Location FiO2 (%)   08/11/17 2117 08/11/17 2117 08/11/17 2117 08/11/17 2117 --   Oral Monitor Sitting Right arm        Review of Systems   Constitutional: Negative for activity change, appetite change, chills, diaphoresis, fatigue and fever.   HENT: Positive for ear pain and hearing loss. Negative for congestion, dental problem, mouth sores, postnasal drip, rhinorrhea, sinus pressure, sore throat, tinnitus, trouble swallowing and voice change.         Right TMJ pain   Eyes: Negative for photophobia, pain, discharge, redness and visual disturbance.   Respiratory: Negative for cough, chest tightness, shortness of  breath, wheezing and stridor.    Cardiovascular: Negative for chest pain, palpitations and leg swelling.   Gastrointestinal: Negative for abdominal pain, diarrhea, nausea and vomiting.   Genitourinary: Negative for difficulty urinating, dysuria, flank pain, frequency, hematuria and urgency.   Musculoskeletal: Negative for arthralgias, back pain, gait problem, joint swelling, myalgias, neck pain and neck stiffness.   Skin: Negative for color change and rash.   Neurological: Negative for dizziness, tremors, seizures, syncope, facial asymmetry, speech difficulty, weakness, light-headedness, numbness and headaches.   Hematological: Negative for adenopathy.   Psychiatric/Behavioral: Negative.  Negative for confusion and decreased concentration. The patient is not nervous/anxious.        Past Medical History:   Diagnosis Date   • Anxiety    • Asthma    • Depression    • Endometriosis    • GERD (gastroesophageal reflux disease)    • Hiatal hernia    • Hiatal hernia    • Hyperlipidemia    • Hypertension    • Injury of back    • Leg pain    • Leukocytosis, likely related to tobacco use    • Liver cyst    • Melanoma     Superficial spreading lentiginous, right lower extremity   • Migraines        Allergies   Allergen Reactions   • Bactrim  [Sulfamethoxazole-Trimethoprim]    • Hydrocodone    • Lisinopril    • Meperidine    • Morphine    • Other Swelling     Erythromycin ophthalmic   • Pregabalin    • Tramadol        Past Surgical History:   Procedure Laterality Date   • APPENDECTOMY     •  SECTION     • CHOLECYSTECTOMY     • COLONOSCOPY     • HEMORRHOIDECTOMY     • HYSTEROSCOPY ENDOMETRIAL ABLATION     • OVARIAN CYST REMOVAL Left     Benign tumor   • OVARY SURGERY Left    • PELVIC LAPAROSCOPY     • SKIN BIOPSY     • SKIN CANCER EXCISION Right     Leg melanoma   • TUBAL ABDOMINAL LIGATION         Family History   Problem Relation Age of Onset   • Cervical cancer Mother 26   • Skin cancer Mother 54     Basal cell   •  Diabetes Mother    • Skin cancer Father 53     Non-melanoma   • Heart disease Father    • Hypertension Father    • Other Daughter      Enlarged spleen   • Brain cancer Other 60   • Pancreatic cancer Other    • Diabetes Other    • Heart disease Other    • Hypertension Other        Social History     Social History   • Marital status:      Spouse name: N/A   • Number of children: N/A   • Years of education: N/A     Occupational History   •  Unemployed     Social History Main Topics   • Smoking status: Current Every Day Smoker     Packs/day: 0.50     Years: 10.00     Types: Cigarettes   • Smokeless tobacco: None   • Alcohol use No   • Drug use: No   • Sexual activity: Defer     Other Topics Concern   • None     Social History Narrative           Objective   Physical Exam   Constitutional: She is oriented to person, place, and time. She appears well-developed and well-nourished. No distress.   HENT:   Head: Normocephalic and atraumatic.   Right Ear: External ear normal.   Left Ear: External ear normal.   Nose: Nose normal.   Mouth/Throat: Oropharynx is clear and moist.   The patient has mild tenderness of the right TMJ and has discomfort with opening her jaw.  She has obvious caries in her right second molar.  The right first molar has been resected.  The patient has a tempanstomy tube embedded in cerumen in the right ear canal.  The right tympanic membrane is normal in appearance.  The left tympanic membrane has a tube in place and appears noninfected.   Eyes: Conjunctivae and EOM are normal. Pupils are equal, round, and reactive to light.   Neck: Normal range of motion. Neck supple.   Lymphadenopathy:     She has no cervical adenopathy.   Neurological: She is alert and oriented to person, place, and time. No cranial nerve deficit.   No focal motor sensory deficit   Skin: Skin is warm and dry. No rash noted. She is not diaphoretic. No erythema.   Psychiatric: She has a normal mood and affect. Her behavior is  normal. Judgment and thought content normal.   Nursing note and vitals reviewed.      Procedures         ED Course  ED Course   Comment By Time   [facility] Report [id number] shows the patient has filled oxycodone on on 7 occasions in the last year.  The patient's had 3 prescriptions one on the last month from her dentist and one from here.  I informed her that we do not manage chronic pain, and that I would prescribe her Voltaren.  I recommended she try to follow-up with the ENT that placed her ear tubes. Krish Barry MD 08/11 9693                  MDM  Number of Diagnoses or Management Options  Arthralgia of right temporomandibular joint: established and worsening  Risk of Complications, Morbidity, and/or Mortality  Presenting problems: low  Diagnostic procedures: minimal  Management options: low    Patient Progress  Patient progress: stable      Final diagnoses:   Arthralgia of right temporomandibular joint           Labs Reviewed - No data to display  No orders to display          Medication List      New Prescriptions          diclofenac 75 MG EC tablet   Commonly known as:  VOLTAREN   Take 1 tablet by mouth 2 (Two) Times a Day As Needed (Pain) for up to 20   doses.                Krish Barry MD  08/11/17 0947

## 2017-08-15 ENCOUNTER — LAB (OUTPATIENT)
Dept: LAB | Facility: HOSPITAL | Age: 37
End: 2017-08-15

## 2017-08-15 ENCOUNTER — OFFICE VISIT (OUTPATIENT)
Dept: ONCOLOGY | Facility: CLINIC | Age: 37
End: 2017-08-15

## 2017-08-15 VITALS
SYSTOLIC BLOOD PRESSURE: 134 MMHG | OXYGEN SATURATION: 97 % | RESPIRATION RATE: 16 BRPM | HEART RATE: 81 BPM | WEIGHT: 199 LBS | DIASTOLIC BLOOD PRESSURE: 85 MMHG | TEMPERATURE: 98.2 F | HEIGHT: 65 IN | BODY MASS INDEX: 33.15 KG/M2

## 2017-08-15 DIAGNOSIS — D72.829 LEUKOCYTOSIS, UNSPECIFIED TYPE: Primary | ICD-10-CM

## 2017-08-15 LAB
BASOPHILS # BLD AUTO: 0.06 10*3/MM3 (ref 0–0.2)
BASOPHILS NFR BLD AUTO: 0.6 % (ref 0–2)
DEPRECATED RDW RBC AUTO: 38.5 FL (ref 37–54)
EOSINOPHIL # BLD AUTO: 0.36 10*3/MM3 (ref 0.1–0.3)
EOSINOPHIL NFR BLD AUTO: 3.4 % (ref 0–4)
ERYTHROCYTE [DISTWIDTH] IN BLOOD BY AUTOMATED COUNT: 12.1 % (ref 11.5–14.5)
HCT VFR BLD AUTO: 39.9 % (ref 37–47)
HGB BLD-MCNC: 13.4 G/DL (ref 12–16)
IMM GRANULOCYTES # BLD: 0.04 10*3/MM3 (ref 0–0.03)
IMM GRANULOCYTES NFR BLD: 0.4 % (ref 0–0.5)
LYMPHOCYTES # BLD AUTO: 3.78 10*3/MM3 (ref 0.6–4.8)
LYMPHOCYTES NFR BLD AUTO: 35.2 % (ref 20–45)
MCH RBC QN AUTO: 29 PG (ref 27–31)
MCHC RBC AUTO-ENTMCNC: 33.6 G/DL (ref 31–37)
MCV RBC AUTO: 86.4 FL (ref 81–99)
MONOCYTES # BLD AUTO: 0.65 10*3/MM3 (ref 0–1)
MONOCYTES NFR BLD AUTO: 6.1 % (ref 3–8)
NEUTROPHILS # BLD AUTO: 5.84 10*3/MM3 (ref 1.5–8.3)
NEUTROPHILS NFR BLD AUTO: 54.3 % (ref 45–70)
NRBC BLD MANUAL-RTO: 0 /100 WBC (ref 0–0)
PLATELET # BLD AUTO: 286 10*3/MM3 (ref 140–500)
PMV BLD AUTO: 10.4 FL (ref 7.4–10.4)
RBC # BLD AUTO: 4.62 10*6/MM3 (ref 4.2–5.4)
WBC NRBC COR # BLD: 10.73 10*3/MM3 (ref 4.8–10.8)

## 2017-08-15 PROCEDURE — 36415 COLL VENOUS BLD VENIPUNCTURE: CPT | Performed by: INTERNAL MEDICINE

## 2017-08-15 PROCEDURE — 85025 COMPLETE CBC W/AUTO DIFF WBC: CPT | Performed by: INTERNAL MEDICINE

## 2017-08-15 PROCEDURE — 99213 OFFICE O/P EST LOW 20 MIN: CPT | Performed by: INTERNAL MEDICINE

## 2017-08-15 RX ORDER — OMEPRAZOLE 40 MG/1
CAPSULE, DELAYED RELEASE ORAL
COMMUNITY
Start: 2017-06-27 | End: 2019-09-30

## 2017-08-15 RX ORDER — FLUTICASONE PROPIONATE 50 MCG
SPRAY, SUSPENSION (ML) NASAL
COMMUNITY
Start: 2017-06-28 | End: 2018-12-06

## 2017-08-15 RX ORDER — BUPROPION HYDROCHLORIDE 300 MG/1
TABLET ORAL
COMMUNITY
Start: 2017-06-27 | End: 2018-12-06

## 2017-08-15 RX ORDER — TRAZODONE HYDROCHLORIDE 150 MG/1
TABLET ORAL
COMMUNITY
Start: 2017-06-27 | End: 2018-12-06

## 2017-08-15 RX ORDER — ATORVASTATIN CALCIUM 80 MG/1
TABLET, FILM COATED ORAL
COMMUNITY
Start: 2017-06-27 | End: 2018-12-06

## 2017-08-15 RX ORDER — ONDANSETRON 4 MG/1
4 TABLET, ORALLY DISINTEGRATING ORAL EVERY 8 HOURS PRN
COMMUNITY
End: 2018-12-06

## 2017-08-15 RX ORDER — LORATADINE 10 MG/1
TABLET ORAL
COMMUNITY
Start: 2017-06-27 | End: 2018-12-06

## 2017-08-15 RX ORDER — PRAMIPEXOLE DIHYDROCHLORIDE 0.5 MG/1
TABLET ORAL
COMMUNITY
Start: 2017-06-23 | End: 2018-12-06

## 2017-08-15 RX ORDER — FENOFIBRATE 54 MG/1
TABLET ORAL
COMMUNITY
Start: 2017-06-27 | End: 2018-12-06

## 2017-08-15 NOTE — PROGRESS NOTES
History:     Reason for follow up:   1. Leukocytosis, likely related to tobacco use.   2. Atypical nevus     HPI:  Emmy Dietz presents to discuss an abnormal skin biopsy.  I have previously seen her for leukocytosis that has been felt to be benign given its stability and likely related to tobacco use.  She is recently been diagnosed in May 2017 with another atypical nevus on skin biopsy by Dr. Lieberman. She notes plans for further resection.  She is complaining of some jaw pain but otherwise is doing fairly well.  She denies any fevers, chills, night sweats, bladder or bowel changes.  She notes that she understands the importance of follow-up with dermatology.    Past Medical   Past Medical History:   Diagnosis Date   • Anxiety    • Asthma    • Depression    • Endometriosis    • GERD (gastroesophageal reflux disease)    • Hiatal hernia    • Hiatal hernia    • Hyperlipidemia    • Hypertension    • Injury of back    • Leg pain    • Leukocytosis, likely related to tobacco use    • Liver cyst    • Melanoma     Superficial spreading lentiginous, right lower extremity   • Migraines     and   Patient Active Problem List   Diagnosis   • Leukocytosis     Social History   Social History     Social History   • Marital status:      Spouse name: N/A   • Number of children: 2   • Years of education: 12     Occupational History   •  Unemployed     Social History Main Topics   • Smoking status: Current Every Day Smoker     Packs/day: 0.50     Years: 10.00     Types: Cigarettes   • Smokeless tobacco: Not on file   • Alcohol use No   • Drug use: No   • Sexual activity: Defer     Other Topics Concern   • Not on file     Social History Narrative     Family History  Family History   Problem Relation Age of Onset   • Cervical cancer Mother 26   • Skin cancer Mother 54     Basal cell   • Diabetes Mother    • Skin cancer Father 53     Non-melanoma   • Heart disease Father    • Hypertension Father    • Other Daughter       "Enlarged spleen   • Brain cancer Other 60   • Pancreatic cancer Other    • Diabetes Other    • Heart disease Other    • Hypertension Other      Allergies  Allergies   Allergen Reactions   • Bactrim  [Sulfamethoxazole-Trimethoprim]    • Hydrocodone    • Lisinopril    • Meperidine    • Morphine    • Other Swelling     Erythromycin ophthalmic   • Pregabalin    • Tramadol        Medications: The current medication list was reviewed in the EMR.    Review of Systems  GENERAL: No change in appetite or weight; No fevers, chills, sweats.    SKIN: No nonhealing lesions. Complains of rash on arms  HEME/LYMPH: No easy bruising, bleeding. No swollen nodes.   EYES: No vision changes or diplopia.   ENT: jaw pain  RESPIRATORY: No cough, shortness of breath, hemoptysis or wheezing.       Objective    Objective:     Vitals:    08/15/17 1558   BP: 134/85   Pulse: 81   Resp: 16   Temp: 98.2 °F (36.8 °C)   SpO2: 97%   Weight: 199 lb (90.3 kg)   Height: 64.96\" (165 cm)  Comment: new    PainSc: 5  Comment: stomach and jaw pain     GENERAL:  Well-developed, well-nourished female in no acute distress.   SKIN:  Warm, dry without purpura or petechiae.  LYMPHATICS:  No cervical, supraclavicular, axillary adenopathy.  CHEST:  Lungs clear to percussion and auscultation. Good airflow.  CARDIAC:  Regular rate and rhythm without murmurs, rubs or gallops. Normal S1,S2. No edema  EXTREMITIES:  No clubbing, cyanosis or edema.  PSYCHIATRIC:  Normal affect and mood.      Labs and Imaging  Results for orders placed or performed in visit on 08/15/17   CBC Auto Differential   Result Value Ref Range    WBC 10.73 4.80 - 10.80 10*3/mm3    RBC 4.62 4.20 - 5.40 10*6/mm3    Hemoglobin 13.4 12.0 - 16.0 g/dL    Hematocrit 39.9 37.0 - 47.0 %    MCV 86.4 81.0 - 99.0 fL    MCH 29.0 27.0 - 31.0 pg    MCHC 33.6 31.0 - 37.0 g/dL    RDW 12.1 11.5 - 14.5 %    RDW-SD 38.5 37.0 - 54.0 fl    MPV 10.4 7.4 - 10.4 fL    Platelets 286 140 - 500 10*3/mm3    Neutrophil % 54.3 " 45.0 - 70.0 %    Lymphocyte % 35.2 20.0 - 45.0 %    Monocyte % 6.1 3.0 - 8.0 %    Eosinophil % 3.4 0.0 - 4.0 %    Basophil % 0.6 0.0 - 2.0 %    Immature Grans % 0.4 0.0 - 0.5 %    Neutrophils, Absolute 5.84 1.50 - 8.30 10*3/mm3    Lymphocytes, Absolute 3.78 0.60 - 4.80 10*3/mm3    Monocytes, Absolute 0.65 0.00 - 1.00 10*3/mm3    Eosinophils, Absolute 0.36 (H) 0.10 - 0.30 10*3/mm3    Basophils, Absolute 0.06 0.00 - 0.20 10*3/mm3    Immature Grans, Absolute 0.04 (H) 0.00 - 0.03 10*3/mm3    nRBC 0.0 0.0 - 0.0 /100 WBC         Assessment/Plan   Assessment/Plan:   1. Leukocytosis. Her white blood cell count is normal today.  I suspect prior elevations related to tobacco use.  I think this is likely related to tobacco use. Given stability of labs and likely benign etiology, I think its is okay for her to follow up with her PCP.  I will plan to see her on an as-needed basis.  She will continue follow-up with her primary care physician.    2. 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   3. Recent atypical compound nevus.  She has an appointment with dermatology for further resection. I will defer follow up and skin exams to dermatology given that this is a local issue and not advanced. I expressed to her the extreme importance that she follow-up with dermatology on a regular basis, avoid sun exposure as much as able and use sunscreen when out in the sun.    Follow-up prn. I asked the patient to call for any questions, concerns, or new symptoms.

## 2017-09-05 ENCOUNTER — HOSPITAL ENCOUNTER (EMERGENCY)
Facility: HOSPITAL | Age: 37
Discharge: HOME | End: 2017-09-05
Attending: EMERGENCY MEDICINE | Admitting: EMERGENCY MEDICINE

## 2017-09-05 VITALS
HEART RATE: 62 BPM | RESPIRATION RATE: 37 BRPM | WEIGHT: 194 LBS | OXYGEN SATURATION: 97 % | TEMPERATURE: 98.5 F | SYSTOLIC BLOOD PRESSURE: 107 MMHG | HEIGHT: 66 IN | DIASTOLIC BLOOD PRESSURE: 89 MMHG | BODY MASS INDEX: 31.18 KG/M2

## 2017-09-05 DIAGNOSIS — I95.1 SYNCOPE DUE TO ORTHOSTATIC HYPOTENSION: Primary | ICD-10-CM

## 2017-09-05 LAB
ALBUMIN SERPL-MCNC: 4 G/DL (ref 3.5–5.2)
ALBUMIN/GLOB SERPL: 1.5 G/DL
ALP SERPL-CCNC: 46 U/L (ref 40–129)
ALT SERPL W P-5'-P-CCNC: 12 U/L (ref 5–33)
ANION GAP SERPL CALCULATED.3IONS-SCNC: 12 MMOL/L
AST SERPL-CCNC: 13 U/L (ref 5–32)
B-HCG UR QL: NEGATIVE
BASOPHILS # BLD AUTO: 0.06 10*3/MM3 (ref 0–0.2)
BASOPHILS NFR BLD AUTO: 0.5 % (ref 0–2)
BILIRUB SERPL-MCNC: 0.2 MG/DL (ref 0.2–1.2)
BILIRUB UR QL STRIP: NEGATIVE
BUN BLD-MCNC: 8 MG/DL (ref 6–20)
BUN/CREAT SERPL: 8.8 (ref 7–25)
CALCIUM SPEC-SCNC: 9.2 MG/DL (ref 8.6–10.5)
CHLORIDE SERPL-SCNC: 101 MMOL/L (ref 98–107)
CLARITY UR: CLEAR
CO2 SERPL-SCNC: 26 MMOL/L (ref 22–29)
COLOR UR: YELLOW
CREAT BLD-MCNC: 0.91 MG/DL (ref 0.57–1)
DEPRECATED RDW RBC AUTO: 40.7 FL (ref 37–54)
EOSINOPHIL # BLD AUTO: 0.37 10*3/MM3 (ref 0.1–0.3)
EOSINOPHIL NFR BLD AUTO: 3.3 % (ref 0–4)
ERYTHROCYTE [DISTWIDTH] IN BLOOD BY AUTOMATED COUNT: 12.4 % (ref 11.5–14.5)
GFR SERPL CREATININE-BSD FRML MDRD: 70 ML/MIN/1.73
GLOBULIN UR ELPH-MCNC: 2.7 GM/DL
GLUCOSE BLD-MCNC: 81 MG/DL (ref 65–99)
GLUCOSE UR STRIP-MCNC: NEGATIVE MG/DL
HCT VFR BLD AUTO: 38.6 % (ref 37–47)
HGB BLD-MCNC: 12.7 G/DL (ref 12–16)
HGB UR QL STRIP.AUTO: NEGATIVE
IMM GRANULOCYTES # BLD: 0.04 10*3/MM3 (ref 0–0.03)
IMM GRANULOCYTES NFR BLD: 0.4 % (ref 0–0.5)
KETONES UR QL STRIP: NEGATIVE
LEUKOCYTE ESTERASE UR QL STRIP.AUTO: NEGATIVE
LYMPHOCYTES # BLD AUTO: 4.32 10*3/MM3 (ref 0.6–4.8)
LYMPHOCYTES NFR BLD AUTO: 39.1 % (ref 20–45)
MCH RBC QN AUTO: 29.5 PG (ref 27–31)
MCHC RBC AUTO-ENTMCNC: 32.9 G/DL (ref 31–37)
MCV RBC AUTO: 89.8 FL (ref 81–99)
MONOCYTES # BLD AUTO: 0.63 10*3/MM3 (ref 0–1)
MONOCYTES NFR BLD AUTO: 5.7 % (ref 3–8)
NEUTROPHILS # BLD AUTO: 5.64 10*3/MM3 (ref 1.5–8.3)
NEUTROPHILS NFR BLD AUTO: 51 % (ref 45–70)
NITRITE UR QL STRIP: NEGATIVE
NRBC BLD MANUAL-RTO: 0 /100 WBC (ref 0–0)
PH UR STRIP.AUTO: 5.5 [PH] (ref 4.5–8)
PLATELET # BLD AUTO: 238 10*3/MM3 (ref 140–500)
PMV BLD AUTO: 10.3 FL (ref 7.4–10.4)
POTASSIUM BLD-SCNC: 4.1 MMOL/L (ref 3.5–5.2)
PROT SERPL-MCNC: 6.7 G/DL (ref 6–8.5)
PROT UR QL STRIP: NEGATIVE
RBC # BLD AUTO: 4.3 10*6/MM3 (ref 4.2–5.4)
SODIUM BLD-SCNC: 139 MMOL/L (ref 136–145)
SP GR UR STRIP: 1.01 (ref 1–1.03)
UROBILINOGEN UR QL STRIP: NORMAL
WBC NRBC COR # BLD: 11.06 10*3/MM3 (ref 4.8–10.8)

## 2017-09-05 PROCEDURE — 99282 EMERGENCY DEPT VISIT SF MDM: CPT | Performed by: EMERGENCY MEDICINE

## 2017-09-05 PROCEDURE — 81003 URINALYSIS AUTO W/O SCOPE: CPT | Performed by: EMERGENCY MEDICINE

## 2017-09-05 PROCEDURE — 93010 ELECTROCARDIOGRAM REPORT: CPT | Performed by: INTERNAL MEDICINE

## 2017-09-05 PROCEDURE — 99284 EMERGENCY DEPT VISIT MOD MDM: CPT

## 2017-09-05 PROCEDURE — 99283 EMERGENCY DEPT VISIT LOW MDM: CPT

## 2017-09-05 PROCEDURE — 85025 COMPLETE CBC W/AUTO DIFF WBC: CPT | Performed by: EMERGENCY MEDICINE

## 2017-09-05 PROCEDURE — 80053 COMPREHEN METABOLIC PANEL: CPT | Performed by: EMERGENCY MEDICINE

## 2017-09-05 PROCEDURE — 81025 URINE PREGNANCY TEST: CPT | Performed by: EMERGENCY MEDICINE

## 2017-09-05 PROCEDURE — 93005 ELECTROCARDIOGRAM TRACING: CPT

## 2017-09-05 PROCEDURE — 96360 HYDRATION IV INFUSION INIT: CPT

## 2017-09-05 RX ORDER — SODIUM CHLORIDE 0.9 % (FLUSH) 0.9 %
10 SYRINGE (ML) INJECTION AS NEEDED
Status: DISCONTINUED | OUTPATIENT
Start: 2017-09-05 | End: 2017-09-05 | Stop reason: HOSPADM

## 2017-09-05 RX ORDER — ALBUTEROL SULFATE 2.5 MG/3ML
2.5 SOLUTION RESPIRATORY (INHALATION) EVERY 4 HOURS PRN
COMMUNITY
End: 2019-09-18

## 2017-09-05 RX ADMIN — SODIUM CHLORIDE 500 ML: 9 INJECTION, SOLUTION INTRAVENOUS at 15:43

## 2017-09-05 NOTE — ED PROVIDER NOTES
Subjective   History of Present Illness  History of Present Illness    Chief complaint: Lightheadedness and syncope    Location: Generalized    Quality/Severity:  Patient will experience a sensation of passing out and on occasion does have syncope.  Moderate severity    Timing/Duration: Ongoing problem for approximately 5 years, but worse over the past few weeks    Modifying Factors: Has previously been told that she suffers from vertigo.    Associated Symptoms: Some eye twitching while unconscious, but no reported seizure activity    Narrative: The patient is a 37-year-old white female who presents as noted above.  The patient relates that she will have intermittent episodes where she suddenly becomes lightheaded, weak and may pass out.  Loss of consciousness is possibly 2-3 minutes and there has been some observed twitching in the extremities, but no seizure activity.  Patient is alert upon reawakening.  No incontinence of bowel or bladder.  Patient was seen by neurology some years ago and has been told that she has vertigo.  Patient denies a spinning sensation.    Review of Systems   Constitutional: Negative for activity change, appetite change, fatigue and fever.   HENT: Negative for congestion.         Known TMJ problem   Eyes: Negative for photophobia and visual disturbance.   Respiratory: Negative for cough, shortness of breath and wheezing.    Cardiovascular: Negative for chest pain, palpitations and leg swelling.   Gastrointestinal: Negative for abdominal pain, diarrhea, nausea and vomiting.   Endocrine: Negative for polydipsia.   Genitourinary: Negative for difficulty urinating, dysuria, flank pain, frequency and urgency.   Musculoskeletal: Negative for back pain.   Skin: Negative for rash.   Neurological: Positive for syncope, light-headedness and headaches. Negative for dizziness, seizures and weakness.   Psychiatric/Behavioral: Negative for confusion.   All other systems reviewed and are  negative.      Past Medical History:   Diagnosis Date   • Anxiety    • Asthma    • Depression    • Endometriosis    • GERD (gastroesophageal reflux disease)    • Hiatal hernia    • Hiatal hernia    • Hyperlipidemia    • Hypertension    • Injury of back    • Leg pain    • Leukocytosis, likely related to tobacco use    • Liver cyst    • Melanoma     Superficial spreading lentiginous, right lower extremity   • Migraines        Allergies   Allergen Reactions   • Bactrim  [Sulfamethoxazole-Trimethoprim]    • Hydrocodone    • Lisinopril    • Lyrica [Pregabalin]    • Meperidine    • Morphine    • Other Swelling     Erythromycin ophthalmic   • Tramadol        Past Surgical History:   Procedure Laterality Date   • APPENDECTOMY     •  SECTION     • CHOLECYSTECTOMY     • COLONOSCOPY     • HEMORRHOIDECTOMY     • HYSTEROSCOPY ENDOMETRIAL ABLATION     • OVARIAN CYST REMOVAL Left     Benign tumor   • OVARY SURGERY Left    • PELVIC LAPAROSCOPY     • SKIN BIOPSY     • SKIN CANCER EXCISION Right     Leg melanoma   • TUBAL ABDOMINAL LIGATION         Family History   Problem Relation Age of Onset   • Cervical cancer Mother 26   • Skin cancer Mother 54     Basal cell   • Diabetes Mother    • Skin cancer Father 53     Non-melanoma   • Heart disease Father    • Hypertension Father    • Other Daughter      Enlarged spleen   • Brain cancer Other 60   • Pancreatic cancer Other    • Diabetes Other    • Heart disease Other    • Hypertension Other        Social History     Social History   • Marital status:      Spouse name: N/A   • Number of children: 2   • Years of education: 12     Occupational History   •  Unemployed     Social History Main Topics   • Smoking status: Current Every Day Smoker     Packs/day: 0.50     Years: 10.00     Types: Cigarettes   • Smokeless tobacco: None   • Alcohol use No   • Drug use: No   • Sexual activity: Defer     Other Topics Concern   • None     Social History Narrative   • None            Objective   Physical Exam   Constitutional: She is oriented to person, place, and time. She appears well-developed and well-nourished.   HENT:   Head: Normocephalic and atraumatic.   Eyes: Conjunctivae and EOM are normal. Pupils are equal, round, and reactive to light.   Neck: Normal range of motion. Neck supple. No thyromegaly present.   Cardiovascular: Normal rate, regular rhythm and normal heart sounds.    No murmur heard.  Pulmonary/Chest: Effort normal and breath sounds normal. No respiratory distress. She has no wheezes. She has no rales.   Abdominal: Soft. Bowel sounds are normal. She exhibits no distension. There is no tenderness.   Musculoskeletal: Normal range of motion. She exhibits no edema or tenderness.   Lymphadenopathy:     She has no cervical adenopathy.   Neurological: She is alert and oriented to person, place, and time. No cranial nerve deficit.   Skin: Skin is warm and dry. No rash noted.   Psychiatric: Thought content normal.   Patient does demonstrate a rather flat affect.   Nursing note and vitals reviewed.      Procedures         ED Course  ED Course   Comment By Time   09/05/17, 3:29 PM  A review of the patient's records does demonstrate multiple emergency room visits and a long list of current medical problems and also a long list of current medications.  It is unclear if the patient is seeking some form of secondary gain by her frequent visits for medical attention.  Orthostatic blood pressures noted and the patient will be given an intravenous bolus of fluids. Campbell Burton MD 09/05 1531   09/05/17, 4:27 PM  Workup unremarkable and all results discussed with patient.  Orthostatic hypotension explained as well as possible causes.  Patient strongly encouraged to get exercise, increase fluid intake and most importantly follow-up with her physician to see if some of her medications could be discontinued.  Patient advised that if she feels lightheaded she needs to immediately  sit down or lay down.  Cardiac monitoring in the emergency department revealed only a normal sinus rhythm with a normal heart rate. Campbell Burton MD 09/05 1628                  MDM  Number of Diagnoses or Management Options  Syncope due to orthostatic hypotension: new and does not require workup     Amount and/or Complexity of Data Reviewed  Clinical lab tests: ordered and reviewed    Risk of Complications, Morbidity, and/or Mortality  Presenting problems: high  Diagnostic procedures: moderate  Management options: moderate      Labs this visit  Lab Results (last 24 hours)     Procedure Component Value Units Date/Time    Urinalysis With / Culture If Indicated [[id number]]  (Normal) Collected:  09/05/17 1534    Specimen:  Urine from Urine, Clean Catch Updated:  09/05/17 1601     Color, UA Yellow     Appearance, UA Clear     pH, UA 5.5     Specific Gravity, UA 1.010     Glucose, UA Negative     Ketones, UA Negative     Bilirubin, UA Negative     Blood, UA Negative     Protein, UA Negative     Leuk Esterase, UA Negative     Nitrite, UA Negative     Urobilinogen, UA 0.2 E.U./dL    Narrative:       Urine microscopic not indicated.    Pregnancy, Urine [[id number]]  (Normal) Collected:  09/05/17 1534    Specimen:  Urine from Urine, Clean Catch Updated:  09/05/17 1610     HCG, Urine QL Negative    CBC & Differential [[id number]] Collected:  09/05/17 1536    Specimen:  Blood Updated:  09/05/17 1550    Narrative:       The following orders were created for panel order CBC & Differential.  Procedure                               Abnormality         Status                     ---------                               -----------         ------                     CBC Auto Differential[[id number]]        Abnormal            Final result                 Please view results for these tests on the individual orders.    Comprehensive Metabolic Panel [[id number]] Collected:  09/05/17 1536    Specimen:  Blood Updated:  09/05/17 1617      Glucose 81 mg/dL      BUN 8 mg/dL      Creatinine 0.91 mg/dL      Sodium 139 mmol/L      Potassium 4.1 mmol/L      Chloride 101 mmol/L      CO2 26.0 mmol/L      Calcium 9.2 mg/dL      Total Protein 6.7 g/dL      Albumin 4.00 g/dL      ALT (SGPT) 12 U/L      AST (SGOT) 13 U/L      Alkaline Phosphatase 46 U/L      Total Bilirubin 0.2 mg/dL      eGFR Non African Amer 70 mL/min/1.73      Globulin 2.7 gm/dL      A/G Ratio 1.5 g/dL      BUN/Creatinine Ratio 8.8     Anion Gap 12.0 mmol/L     CBC Auto Differential [[id number]]  (Abnormal) Collected:  09/05/17 1536    Specimen:  Blood Updated:  09/05/17 1550     WBC 11.06 (H) 10*3/mm3      RBC 4.30 10*6/mm3      Hemoglobin 12.7 g/dL      Hematocrit 38.6 %      MCV 89.8 fL      MCH 29.5 pg      MCHC 32.9 g/dL      RDW 12.4 %      RDW-SD 40.7 fl      MPV 10.3 fL      Platelets 238 10*3/mm3      Neutrophil % 51.0 %      Lymphocyte % 39.1 %      Monocyte % 5.7 %      Eosinophil % 3.3 %      Basophil % 0.5 %      Immature Grans % 0.4 %      Neutrophils, Absolute 5.64 10*3/mm3      Lymphocytes, Absolute 4.32 10*3/mm3      Monocytes, Absolute 0.63 10*3/mm3      Eosinophils, Absolute 0.37 (H) 10*3/mm3      Basophils, Absolute 0.06 10*3/mm3      Immature Grans, Absolute 0.04 (H) 10*3/mm3      nRBC 0.0 /100 WBC         Prescribed on discharge             Medication List      Notice     No changes were made to your prescriptions during this visit.        All lab results, imaging results and other tests were reviewed by Campbell Burton MD and unless otherwise specified were found to be unremarkable.      Final diagnoses:   Syncope due to orthostatic hypotension            Campbell Burton MD  09/05/17 5520

## 2017-09-05 NOTE — ED TRIAGE NOTES
"Pt reports she started passing out about 5 years ago.  Has been diagnosed with vertigo and inner ear problems but \"I'm still passing out\".  Pt reports this experience has been worse the past 2 weeks.    "

## 2017-09-06 ENCOUNTER — APPOINTMENT (OUTPATIENT)
Dept: GENERAL RADIOLOGY | Facility: HOSPITAL | Age: 37
End: 2017-09-06

## 2017-09-06 ENCOUNTER — HOSPITAL ENCOUNTER (EMERGENCY)
Facility: HOSPITAL | Age: 37
Discharge: HOME | End: 2017-09-06
Attending: EMERGENCY MEDICINE | Admitting: EMERGENCY MEDICINE

## 2017-09-06 VITALS
WEIGHT: 195 LBS | HEART RATE: 79 BPM | DIASTOLIC BLOOD PRESSURE: 77 MMHG | OXYGEN SATURATION: 99 % | HEIGHT: 66 IN | BODY MASS INDEX: 31.34 KG/M2 | SYSTOLIC BLOOD PRESSURE: 129 MMHG | TEMPERATURE: 98.1 F | RESPIRATION RATE: 16 BRPM

## 2017-09-06 DIAGNOSIS — R42 VERTIGO: Primary | ICD-10-CM

## 2017-09-06 DIAGNOSIS — R07.89 ATYPICAL CHEST PAIN: ICD-10-CM

## 2017-09-06 LAB
ALBUMIN SERPL-MCNC: 4.2 G/DL (ref 3.5–5.2)
ALBUMIN/GLOB SERPL: 1.6 G/DL
ALP SERPL-CCNC: 49 U/L (ref 39–117)
ALT SERPL W P-5'-P-CCNC: 12 U/L (ref 1–33)
ANION GAP SERPL CALCULATED.3IONS-SCNC: 11.2 MMOL/L
AST SERPL-CCNC: 13 U/L (ref 1–32)
BASOPHILS # BLD AUTO: 0.05 10*3/MM3 (ref 0–0.2)
BASOPHILS NFR BLD AUTO: 0.4 % (ref 0–1.5)
BILIRUB SERPL-MCNC: <0.2 MG/DL (ref 0.1–1.2)
BILIRUB UR QL STRIP: NEGATIVE
BUN BLD-MCNC: 10 MG/DL (ref 6–20)
BUN/CREAT SERPL: 11 (ref 7–25)
CALCIUM SPEC-SCNC: 9 MG/DL (ref 8.6–10.5)
CHLORIDE SERPL-SCNC: 102 MMOL/L (ref 98–107)
CLARITY UR: CLEAR
CO2 SERPL-SCNC: 26.8 MMOL/L (ref 22–29)
COLOR UR: YELLOW
CREAT BLD-MCNC: 0.91 MG/DL (ref 0.57–1)
DEPRECATED RDW RBC AUTO: 43 FL (ref 37–54)
EOSINOPHIL # BLD AUTO: 0.66 10*3/MM3 (ref 0–0.7)
EOSINOPHIL NFR BLD AUTO: 5 % (ref 0.3–6.2)
ERYTHROCYTE [DISTWIDTH] IN BLOOD BY AUTOMATED COUNT: 12.8 % (ref 11.7–13)
GFR SERPL CREATININE-BSD FRML MDRD: 70 ML/MIN/1.73
GLOBULIN UR ELPH-MCNC: 2.6 GM/DL
GLUCOSE BLD-MCNC: 99 MG/DL (ref 65–99)
GLUCOSE UR STRIP-MCNC: NEGATIVE MG/DL
HCT VFR BLD AUTO: 38.9 % (ref 35.6–45.5)
HGB BLD-MCNC: 12.7 G/DL (ref 11.9–15.5)
HGB UR QL STRIP.AUTO: NEGATIVE
IMM GRANULOCYTES # BLD: 0.04 10*3/MM3 (ref 0–0.03)
IMM GRANULOCYTES NFR BLD: 0.3 % (ref 0–0.5)
KETONES UR QL STRIP: NEGATIVE
LEUKOCYTE ESTERASE UR QL STRIP.AUTO: NEGATIVE
LYMPHOCYTES # BLD AUTO: 5.76 10*3/MM3 (ref 0.9–4.8)
LYMPHOCYTES NFR BLD AUTO: 43.3 % (ref 19.6–45.3)
MCH RBC QN AUTO: 29.8 PG (ref 26.9–32)
MCHC RBC AUTO-ENTMCNC: 32.6 G/DL (ref 32.4–36.3)
MCV RBC AUTO: 91.3 FL (ref 80.5–98.2)
MONOCYTES # BLD AUTO: 0.75 10*3/MM3 (ref 0.2–1.2)
MONOCYTES NFR BLD AUTO: 5.6 % (ref 5–12)
NEUTROPHILS # BLD AUTO: 6.03 10*3/MM3 (ref 1.9–8.1)
NEUTROPHILS NFR BLD AUTO: 45.4 % (ref 42.7–76)
NITRITE UR QL STRIP: NEGATIVE
NRBC BLD MANUAL-RTO: 0 /100 WBC (ref 0–0)
PH UR STRIP.AUTO: 5.5 [PH] (ref 5–8)
PLATELET # BLD AUTO: 242 10*3/MM3 (ref 140–500)
PMV BLD AUTO: 10.6 FL (ref 6–12)
POTASSIUM BLD-SCNC: 3.8 MMOL/L (ref 3.5–5.2)
PROT SERPL-MCNC: 6.8 G/DL (ref 6–8.5)
PROT UR QL STRIP: NEGATIVE
RBC # BLD AUTO: 4.26 10*6/MM3 (ref 3.9–5.2)
SODIUM BLD-SCNC: 140 MMOL/L (ref 136–145)
SP GR UR STRIP: 1.02 (ref 1–1.03)
UROBILINOGEN UR QL STRIP: NORMAL
WBC NRBC COR # BLD: 13.29 10*3/MM3 (ref 4.5–10.7)

## 2017-09-06 PROCEDURE — 71020 HC CHEST PA AND LATERAL: CPT

## 2017-09-06 PROCEDURE — 96360 HYDRATION IV INFUSION INIT: CPT

## 2017-09-06 PROCEDURE — 80053 COMPREHEN METABOLIC PANEL: CPT | Performed by: EMERGENCY MEDICINE

## 2017-09-06 PROCEDURE — 85025 COMPLETE CBC W/AUTO DIFF WBC: CPT | Performed by: EMERGENCY MEDICINE

## 2017-09-06 PROCEDURE — 81003 URINALYSIS AUTO W/O SCOPE: CPT | Performed by: EMERGENCY MEDICINE

## 2017-09-06 PROCEDURE — 96361 HYDRATE IV INFUSION ADD-ON: CPT

## 2017-09-06 RX ORDER — MECLIZINE HYDROCHLORIDE 25 MG/1
25 TABLET ORAL 4 TIMES DAILY PRN
Qty: 30 TABLET | Refills: 0 | Status: SHIPPED | OUTPATIENT
Start: 2017-09-06 | End: 2019-09-18

## 2017-09-06 RX ORDER — SODIUM CHLORIDE 0.9 % (FLUSH) 0.9 %
10 SYRINGE (ML) INJECTION AS NEEDED
Status: DISCONTINUED | OUTPATIENT
Start: 2017-09-06 | End: 2017-09-06 | Stop reason: HOSPADM

## 2017-09-06 RX ORDER — MELOXICAM 15 MG/1
15 TABLET ORAL DAILY
Qty: 10 TABLET | Refills: 0 | Status: SHIPPED | OUTPATIENT
Start: 2017-09-06 | End: 2018-12-06

## 2017-09-06 RX ADMIN — SODIUM CHLORIDE 1000 ML: 9 INJECTION, SOLUTION INTRAVENOUS at 01:26

## 2017-09-06 NOTE — ED PROVIDER NOTES
" EMERGENCY DEPARTMENT ENCOUNTER    CHIEF COMPLAINT  Chief Complaint: Syncope  History given by: Patient  History limited by:   Room Number:   PMD: Ansley Meier Nolan, SHANELLE      HPI:  Pt is a 37 y.o. female who presents complaining of snycopal episode that occurred yesterday She also reports having some CP, SOA, and L sided neck pain that onset today. Pt reports being dizziness (described as spinning sensation) that onset 5 days ago. She reports some mild ear pain and headache. She has had intermittent dizziness in the past. She states the dizziness is worse with standing upright.    Duration: brief  Onset: sudden  Timing: once  Quality: \"passed out\"  Intensity/Severity: moderate  Progression: improved  Associated Symptoms: chest pain, SOA, neck pain, dizziness  Aggravating Factors: standing upright  Alleviating Factors: none  Previous Episodes: none  Treatment before arrival: none    PAST MEDICAL HISTORY  Active Ambulatory Problems     Diagnosis Date Noted   • Leukocytosis 2016     Resolved Ambulatory Problems     Diagnosis Date Noted   • No Resolved Ambulatory Problems     Past Medical History:   Diagnosis Date   • Anxiety    • Asthma    • Depression    • Endometriosis    • GERD (gastroesophageal reflux disease)    • Hiatal hernia    • Hiatal hernia    • Hyperlipidemia    • Hypertension    • Injury of back    • Leg pain    • Leukocytosis, likely related to tobacco use    • Liver cyst    • Melanoma    • Migraines        PAST SURGICAL HISTORY  Past Surgical History:   Procedure Laterality Date   • APPENDECTOMY     •  SECTION     • CHOLECYSTECTOMY     • COLONOSCOPY     • HEMORRHOIDECTOMY     • HYSTEROSCOPY ENDOMETRIAL ABLATION     • OVARIAN CYST REMOVAL Left     Benign tumor   • OVARY SURGERY Left    • PELVIC LAPAROSCOPY     • SKIN BIOPSY     • SKIN CANCER EXCISION Right     Leg melanoma   • TUBAL ABDOMINAL LIGATION         FAMILY HISTORY  Family History   Problem Relation Age of Onset   • " Cervical cancer Mother 26   • Skin cancer Mother 54     Basal cell   • Diabetes Mother    • Skin cancer Father 53     Non-melanoma   • Heart disease Father    • Hypertension Father    • Other Daughter      Enlarged spleen   • Brain cancer Other 60   • Pancreatic cancer Other    • Diabetes Other    • Heart disease Other    • Hypertension Other        SOCIAL HISTORY  Social History     Social History   • Marital status:      Spouse name: N/A   • Number of children: 2   • Years of education: 12     Occupational History   •  Unemployed     Social History Main Topics   • Smoking status: Current Every Day Smoker     Packs/day: 0.50     Years: 10.00     Types: Cigarettes   • Smokeless tobacco: Not on file   • Alcohol use No   • Drug use: No   • Sexual activity: Defer     Other Topics Concern   • Not on file     Social History Narrative       ALLERGIES  Bactrim  [sulfamethoxazole-trimethoprim]; Hydrocodone; Lisinopril; Lyrica [pregabalin]; Meperidine; Morphine; Other; and Tramadol    REVIEW OF SYSTEMS  Review of Systems   Constitutional: Negative for fever.   HENT: Negative for sore throat.    Eyes: Negative.    Respiratory: Positive for shortness of breath. Negative for cough.    Cardiovascular: Positive for chest pain.   Gastrointestinal: Negative for abdominal pain, diarrhea and vomiting.   Genitourinary: Negative for dysuria.   Musculoskeletal: Positive for neck pain.   Skin: Negative for rash.   Allergic/Immunologic: Negative.    Neurological: Positive for dizziness. Negative for weakness, numbness and headaches.   Hematological: Negative.    Psychiatric/Behavioral: Negative.    All other systems reviewed and are negative.      PHYSICAL EXAM  ED Triage Vitals   Temp Heart Rate Resp BP SpO2   09/05/17 2230 09/05/17 2230 09/05/17 2230 09/05/17 2230 09/05/17 2230   98.1 °F (36.7 °C) 103 16 141/88 99 %      Temp src Heart Rate Source Patient Position BP Location FiO2 (%)   09/05/17 2230 09/05/17 2230 09/05/17 2230  09/05/17 2230 --   Oral Monitor Sitting Right arm        Physical Exam   Constitutional: She is oriented to person, place, and time and well-developed, well-nourished, and in no distress. No distress.   HENT:   Head: Normocephalic and atraumatic.   Right Ear: Tympanic membrane normal.   Left Ear: Tympanic membrane normal.   TM tubes in place bilaterally   Eyes: EOM are normal. Pupils are equal, round, and reactive to light.   Neck: Normal range of motion. Neck supple.   Cardiovascular: Normal rate, regular rhythm and normal heart sounds.    Pulmonary/Chest: Effort normal and breath sounds normal. No respiratory distress. She exhibits tenderness (L anterior).   Abdominal: Soft. There is no tenderness. There is no rebound and no guarding.   Musculoskeletal: Normal range of motion. She exhibits no edema.   Neurological: She is alert and oriented to person, place, and time. She has normal sensation and normal strength.   Skin: Skin is warm and dry. No rash noted.   Psychiatric: Mood and affect normal.   Nursing note and vitals reviewed.      LAB RESULTS  Lab Results (last 24 hours)     Procedure Component Value Units Date/Time    Urinalysis With / Culture If Indicated [[id number]]  (Normal) Collected:  09/05/17 1534    Specimen:  Urine from Urine, Clean Catch Updated:  09/05/17 1601     Color, UA Yellow     Appearance, UA Clear     pH, UA 5.5     Specific Gravity, UA 1.010     Glucose, UA Negative     Ketones, UA Negative     Bilirubin, UA Negative     Blood, UA Negative     Protein, UA Negative     Leuk Esterase, UA Negative     Nitrite, UA Negative     Urobilinogen, UA 0.2 E.U./dL    Narrative:       Urine microscopic not indicated.    Pregnancy, Urine [[id number]]  (Normal) Collected:  09/05/17 1534    Specimen:  Urine from Urine, Clean Catch Updated:  09/05/17 1610     HCG, Urine QL Negative    CBC & Differential [[id number]] Collected:  09/05/17 1536    Specimen:  Blood Updated:  09/05/17 1550    Narrative:       The  following orders were created for panel order CBC & Differential.  Procedure                               Abnormality         Status                     ---------                               -----------         ------                     CBC Auto Differential[[id number]]        Abnormal            Final result                 Please view results for these tests on the individual orders.    Comprehensive Metabolic Panel [[id number]] Collected:  09/05/17 1536    Specimen:  Blood Updated:  09/05/17 1617     Glucose 81 mg/dL      BUN 8 mg/dL      Creatinine 0.91 mg/dL      Sodium 139 mmol/L      Potassium 4.1 mmol/L      Chloride 101 mmol/L      CO2 26.0 mmol/L      Calcium 9.2 mg/dL      Total Protein 6.7 g/dL      Albumin 4.00 g/dL      ALT (SGPT) 12 U/L      AST (SGOT) 13 U/L      Alkaline Phosphatase 46 U/L      Total Bilirubin 0.2 mg/dL      eGFR Non African Amer 70 mL/min/1.73      Globulin 2.7 gm/dL      A/G Ratio 1.5 g/dL      BUN/Creatinine Ratio 8.8     Anion Gap 12.0 mmol/L     CBC Auto Differential [[id number]]  (Abnormal) Collected:  09/05/17 1536    Specimen:  Blood Updated:  09/05/17 1550     WBC 11.06 (H) 10*3/mm3      RBC 4.30 10*6/mm3      Hemoglobin 12.7 g/dL      Hematocrit 38.6 %      MCV 89.8 fL      MCH 29.5 pg      MCHC 32.9 g/dL      RDW 12.4 %      RDW-SD 40.7 fl      MPV 10.3 fL      Platelets 238 10*3/mm3      Neutrophil % 51.0 %      Lymphocyte % 39.1 %      Monocyte % 5.7 %      Eosinophil % 3.3 %      Basophil % 0.5 %      Immature Grans % 0.4 %      Neutrophils, Absolute 5.64 10*3/mm3      Lymphocytes, Absolute 4.32 10*3/mm3      Monocytes, Absolute 0.63 10*3/mm3      Eosinophils, Absolute 0.37 (H) 10*3/mm3      Basophils, Absolute 0.06 10*3/mm3      Immature Grans, Absolute 0.04 (H) 10*3/mm3      nRBC 0.0 /100 WBC     Urinalysis With / Culture If Indicated [[id number]]  (Normal) Collected:  09/06/17 0117    Specimen:  Urine from Urine, Clean Catch Updated:  09/06/17 0131     Color, UA  Yellow     Appearance, UA Clear     pH, UA 5.5     Specific Gravity, UA 1.023     Glucose, UA Negative     Ketones, UA Negative     Bilirubin, UA Negative     Blood, UA Negative     Protein, UA Negative     Leuk Esterase, UA Negative     Nitrite, UA Negative     Urobilinogen, UA 0.2 E.U./dL    Narrative:       Urine microscopic not indicated.    CBC & Differential [[id number]] Collected:  09/06/17 0126    Specimen:  Blood Updated:  09/06/17 0153    Narrative:       The following orders were created for panel order CBC & Differential.  Procedure                               Abnormality         Status                     ---------                               -----------         ------                     Scan Slide[[id number]]                                                                  CBC Auto Differential[[id number]]        Abnormal            Final result                 Please view results for these tests on the individual orders.    Comprehensive Metabolic Panel [[id number]] Collected:  09/06/17 0126    Specimen:  Blood Updated:  09/06/17 0210     Glucose 99 mg/dL      BUN 10 mg/dL      Creatinine 0.91 mg/dL      Sodium 140 mmol/L      Potassium 3.8 mmol/L      Chloride 102 mmol/L      CO2 26.8 mmol/L      Calcium 9.0 mg/dL      Total Protein 6.8 g/dL      Albumin 4.20 g/dL      ALT (SGPT) 12 U/L      AST (SGOT) 13 U/L      Alkaline Phosphatase 49 U/L      Total Bilirubin <0.2 mg/dL      eGFR Non African Amer 70 mL/min/1.73      Globulin 2.6 gm/dL      A/G Ratio 1.6 g/dL      BUN/Creatinine Ratio 11.0     Anion Gap 11.2 mmol/L     CBC Auto Differential [[id number]]  (Abnormal) Collected:  09/06/17 0126    Specimen:  Blood Updated:  09/06/17 0153     WBC 13.29 (H) 10*3/mm3      RBC 4.26 10*6/mm3      Hemoglobin 12.7 g/dL      Hematocrit 38.9 %      MCV 91.3 fL      MCH 29.8 pg      MCHC 32.6 g/dL      RDW 12.8 %      RDW-SD 43.0 fl      MPV 10.6 fL      Platelets 242 10*3/mm3      Neutrophil % 45.4 %       Lymphocyte % 43.3 %      Monocyte % 5.6 %      Eosinophil % 5.0 %      Basophil % 0.4 %      Immature Grans % 0.3 %      Neutrophils, Absolute 6.03 10*3/mm3      Lymphocytes, Absolute 5.76 (H) 10*3/mm3      Monocytes, Absolute 0.75 10*3/mm3      Eosinophils, Absolute 0.66 10*3/mm3      Basophils, Absolute 0.05 10*3/mm3      Immature Grans, Absolute 0.04 (H) 10*3/mm3      nRBC 0.0 /100 WBC           I ordered the above labs and reviewed the results    RADIOLOGY  XR Chest 2 View   Preliminary Result   No acute findings.                   I ordered the above noted radiological studies. Interpreted by radiologist. Reviewed by me in PACS.       PROCEDURES  Procedures  EKG           EKG time: 2239  Rhythm/Rate: NSR, 70BPM  P waves and OR: nml  QRS, axis: nml   ST and T waves: nml     Interpreted Contemporaneously by me, independently viewed  unchanged compared to prior 7/22/15      PROGRESS AND CONSULTS  ED Course   1:00 AM  Ordered blood work, UA, and EKG. Ordered IVF.   2:15 AM  Ordered CXR       MEDICAL DECISION MAKING  Results were reviewed/discussed with the patient and they were also made aware of online access. Pt also made aware that some labs, such as cultures, will not be resulted during ER visit and follow up with PMD is necessary.     MDM  Number of Diagnoses or Management Options  Atypical chest pain:   Vertigo:      Amount and/or Complexity of Data Reviewed  Clinical lab tests: ordered and reviewed  Tests in the radiology section of CPT®: ordered and reviewed  Tests in the medicine section of CPT®: reviewed and ordered (EKG - See EKG procedure note  )  Independent visualization of images, tracings, or specimens: yes           DIAGNOSIS  Final diagnoses:   Vertigo   Atypical chest pain       DISPOSITION  DISCHARGE    Patient discharged in stable condition.    Reviewed implications of results, diagnosis, meds, responsibility to follow up, warning signs and symptoms of possible worsening, potential  complications and reasons to return to ER.    Patient/Family voiced understanding of above instructions.    Discussed plan for discharge, as there is no emergent indication for admission.  Pt/family is agreeable and understands need for follow up and repeat testing.  Pt is aware that discharge does not mean that nothing is wrong but it indicates no emergency is present that requires admission and they must continue care with follow-up as given below or physician of their choice.     FOLLOW-UP  Ansley Francisco, APRN  [address] [place] [id number] [phone]    Call           Medication List      New Prescriptions          meclizine 25 MG tablet   Commonly known as:  ANTIVERT   Take 1 tablet by mouth 4 (Four) Times a Day As Needed for dizziness.       meloxicam 15 MG tablet   Commonly known as:  MOBIC   Take 1 tablet by mouth Daily.               Latest Documented Vital Signs:  As of 2:50 AM  BP- 129/77 HR- 86 Temp- 98.1 °F (36.7 °C) (Oral) O2 sat- 100%    --  Documentation assistance provided by quinten Antunez for Dr. Sanchez.  Information recorded by the scribe was done at my direction and has been verified and validated by me.     Fernando Antunez  09/06/17 0250       Yung Sanchez MD  09/06/17 0704

## 2017-10-02 ENCOUNTER — TRANSCRIBE ORDERS (OUTPATIENT)
Dept: ADMINISTRATIVE | Facility: HOSPITAL | Age: 37
End: 2017-10-02

## 2017-10-02 DIAGNOSIS — K29.70 HELICOBACTER PYLORI GASTRITIS: ICD-10-CM

## 2017-10-02 DIAGNOSIS — B96.81 HELICOBACTER PYLORI GASTRITIS: ICD-10-CM

## 2017-10-02 DIAGNOSIS — K21.00 REFLUX ESOPHAGITIS: Primary | ICD-10-CM

## 2017-10-26 ENCOUNTER — HOSPITAL ENCOUNTER (EMERGENCY)
Facility: HOSPITAL | Age: 37
Discharge: HOME | End: 2017-10-26
Attending: EMERGENCY MEDICINE | Admitting: EMERGENCY MEDICINE

## 2017-10-26 VITALS
TEMPERATURE: 98.1 F | HEIGHT: 66 IN | RESPIRATION RATE: 18 BRPM | OXYGEN SATURATION: 97 % | WEIGHT: 195 LBS | DIASTOLIC BLOOD PRESSURE: 126 MMHG | HEART RATE: 124 BPM | SYSTOLIC BLOOD PRESSURE: 177 MMHG | BODY MASS INDEX: 31.34 KG/M2

## 2017-10-26 DIAGNOSIS — Z51.89 VISIT FOR WOUND CHECK: Primary | ICD-10-CM

## 2017-10-26 PROCEDURE — 99282 EMERGENCY DEPT VISIT SF MDM: CPT | Performed by: EMERGENCY MEDICINE

## 2017-10-26 PROCEDURE — 99283 EMERGENCY DEPT VISIT LOW MDM: CPT

## 2017-10-27 NOTE — ED NOTES
Went over discharge instructions with patient, alert and oriented at time of discharge, no questions at this time. Left ambulatory.       Carol Ann Girard RN  10/26/17 0738

## 2017-10-27 NOTE — ED NOTES
Recent skin biopsy done on October 12 on lower mid back. Nickel size area with some small erythema surrounding. Pt currently on antibiotic therapy. Pt concerned that the spot is infected.      Carol Ann Girard RN  10/26/17 4182

## 2017-10-27 NOTE — DISCHARGE INSTRUCTIONS
Continue taking your antibiotics as directed.  Please return to the emergency room for any worsening pain, redness, swelling, fevers, drainage or any other concerns.

## 2017-10-27 NOTE — ED PROVIDER NOTES
Subjective   History of Present Illness  History of Present Illness    Chief complaint: Wound check    Location: Mid back    Quality/Severity:  Moderate pain    Timing/Duration: Ongoing for the past couple weeks    Modifying Factors: Worse with bending or movement of the body    Narrative: This patient presents for evaluation of a wound check to her back.  She had a biopsy excision performed for an atypical nevus by her dermatologist about 2 weeks ago.  After the procedure, she was started on a course of Keflex 500 mg which she has been taking every day since.  She denies any fevers.  She was concerned that the wound may have some infection because that has been very sore recently.  She says it hurts to touch and it also hurts to bend or twist the body and any redirection.  She notes that she does have some previous problems with back pain in the past, however.  She has an appointment to follow up with her dermatologist on November 2.  She denies any other areas of concern at this time.    Associated Symptoms: None    Review of Systems   Constitutional: Negative for activity change, chills and fever.   HENT: Negative.    Respiratory: Negative for shortness of breath.    Cardiovascular: Negative for chest pain.   Gastrointestinal: Negative for abdominal pain.   Musculoskeletal: Positive for back pain and myalgias.   Skin: Positive for wound. Negative for pallor.   Neurological: Negative for syncope, numbness and headaches.   All other systems reviewed and are negative.      Past Medical History:   Diagnosis Date   • Anxiety    • Asthma    • Depression    • Endometriosis    • GERD (gastroesophageal reflux disease)    • Hiatal hernia    • Hiatal hernia    • Hyperlipidemia    • Hypertension    • Injury of back    • Leg pain    • Leukocytosis, likely related to tobacco use    • Liver cyst    • Melanoma     Superficial spreading lentiginous, right lower extremity   • Migraines        Allergies   Allergen Reactions   •  Bactrim  [Sulfamethoxazole-Trimethoprim]    • Hydrocodone    • Lisinopril    • Lyrica [Pregabalin]    • Meperidine    • Morphine    • Other Swelling     Erythromycin ophthalmic   • Tramadol        Past Surgical History:   Procedure Laterality Date   • APPENDECTOMY     •  SECTION     • CHOLECYSTECTOMY     • COLONOSCOPY     • HEMORRHOIDECTOMY     • HYSTEROSCOPY ENDOMETRIAL ABLATION     • OVARIAN CYST REMOVAL Left     Benign tumor   • OVARY SURGERY Left    • PELVIC LAPAROSCOPY     • SKIN BIOPSY     • SKIN CANCER EXCISION Right     Leg melanoma   • TUBAL ABDOMINAL LIGATION         Family History   Problem Relation Age of Onset   • Cervical cancer Mother 26   • Skin cancer Mother 54     Basal cell   • Diabetes Mother    • Skin cancer Father 53     Non-melanoma   • Heart disease Father    • Hypertension Father    • Other Daughter      Enlarged spleen   • Brain cancer Other 60   • Pancreatic cancer Other    • Diabetes Other    • Heart disease Other    • Hypertension Other        Social History     Social History   • Marital status:      Spouse name: N/A   • Number of children: 2   • Years of education: 12     Occupational History   •  Unemployed     Social History Main Topics   • Smoking status: Current Every Day Smoker     Packs/day: 0.50     Years: 10.00     Types: Cigarettes   • Smokeless tobacco: None   • Alcohol use No   • Drug use: No   • Sexual activity: Defer     Other Topics Concern   • None     Social History Narrative       ED Triage Vitals   Temp Heart Rate Resp BP SpO2   10/26/17 2104 10/26/17 2104 10/26/17 2104 10/26/17 2104 10/26/17 2104   98.1 °F (36.7 °C) 124 18 177/126 97 %      Temp src Heart Rate Source Patient Position BP Location FiO2 (%)   10/26/17 2104 -- -- -- --   Oral             Objective   Physical Exam   Constitutional: She is oriented to person, place, and time. She appears well-developed and well-nourished. No distress.   HENT:   Head: Normocephalic and atraumatic.    Eyes: EOM are normal. Pupils are equal, round, and reactive to light. Right eye exhibits no discharge. Left eye exhibits no discharge.   Neck: Normal range of motion. Neck supple.   Cardiovascular: Regular rhythm and intact distal pulses.    Heart rate in the low 100s during my exam   Pulmonary/Chest: Effort normal. No respiratory distress.   Musculoskeletal: Normal range of motion. She exhibits tenderness. She exhibits no edema or deformity.   The low thoracic back shows one wound just to the right of the midline that appears to be well-healing with some scab formation.  There are sutures noted within the wound pocket.  There is a minimal ring of hyperemia surrounding the wound that looks like normal pink granulation tissue.  There is no surrounding induration or fluctuance.  The surrounding skin is not hot to the touch.  There is no drainage or discharge evident.  There is no bleeding.   Neurological: She is alert and oriented to person, place, and time. She exhibits normal muscle tone.   Skin: Skin is warm and dry. No rash noted. She is not diaphoretic. No erythema. No pallor.   Psychiatric: She has a normal mood and affect. Her behavior is normal. Judgment and thought content normal.   Nursing note and vitals reviewed.      Procedures         ED Course  ED Course   Comment By Time   Patient presented for a wound check for the wound on her back.  There does not appear to be any worrisome signs of clinical infection that I can visualize right now.  She is afebrile.  Her heart rate is a little bit elevated but it has been elevated in previous visits as well.  She is already taking Keflex.  I considered adding Bactrim but then I realize she has an allergy to that antibiotic.  At this point, I don't think it is necessary for me to make any antibiotic changes or interventions right now.  I spoke with her at length about my preference for her to contact her dermatologist first thing tomorrow morning to schedule an  appointment for reevaluation and discuss her medications with him directly.  She agreed to do so.  I explained to her that she should return to the emergency room immediately if she develops any worsening pain, fevers, numbness, drainage or any other worrisome signs at home.  She agreed to do so. Yosi Mathew MD 10/26 2205                  MDM  Number of Diagnoses or Management Options  Visit for wound check:      Amount and/or Complexity of Data Reviewed  Decide to obtain previous medical records or to obtain history from someone other than the patient: yes  Review and summarize past medical records: yes    Risk of Complications, Morbidity, and/or Mortality  Presenting problems: moderate  Diagnostic procedures: low  Management options: moderate        Final diagnoses:   Visit for wound check            Yosi Mathew MD  10/26/17 3964

## 2017-11-02 ENCOUNTER — HOSPITAL ENCOUNTER (EMERGENCY)
Facility: HOSPITAL | Age: 37
Discharge: HOME | End: 2017-11-03
Attending: EMERGENCY MEDICINE | Admitting: EMERGENCY MEDICINE

## 2017-11-02 ENCOUNTER — APPOINTMENT (OUTPATIENT)
Dept: CT IMAGING | Facility: HOSPITAL | Age: 37
End: 2017-11-02

## 2017-11-02 DIAGNOSIS — L08.9 WOUND INFECTION: ICD-10-CM

## 2017-11-02 DIAGNOSIS — L03.312 CELLULITIS OF SKIN OF BACK: Primary | ICD-10-CM

## 2017-11-02 DIAGNOSIS — T14.8XXA WOUND INFECTION: ICD-10-CM

## 2017-11-02 PROCEDURE — 80053 COMPREHEN METABOLIC PANEL: CPT | Performed by: EMERGENCY MEDICINE

## 2017-11-02 PROCEDURE — 99284 EMERGENCY DEPT VISIT MOD MDM: CPT | Performed by: EMERGENCY MEDICINE

## 2017-11-02 PROCEDURE — 99283 EMERGENCY DEPT VISIT LOW MDM: CPT

## 2017-11-02 PROCEDURE — 85025 COMPLETE CBC W/AUTO DIFF WBC: CPT | Performed by: EMERGENCY MEDICINE

## 2017-11-02 PROCEDURE — 96365 THER/PROPH/DIAG IV INF INIT: CPT

## 2017-11-02 RX ORDER — ONDANSETRON 4 MG/1
4 TABLET, ORALLY DISINTEGRATING ORAL ONCE
Status: COMPLETED | OUTPATIENT
Start: 2017-11-02 | End: 2017-11-02

## 2017-11-02 RX ORDER — SODIUM CHLORIDE 0.9 % (FLUSH) 0.9 %
10 SYRINGE (ML) INJECTION AS NEEDED
Status: DISCONTINUED | OUTPATIENT
Start: 2017-11-02 | End: 2017-11-03 | Stop reason: HOSPADM

## 2017-11-02 RX ADMIN — VANCOMYCIN HYDROCHLORIDE 1 G: 1 INJECTION, POWDER, LYOPHILIZED, FOR SOLUTION INTRAVENOUS at 23:46

## 2017-11-02 RX ADMIN — ONDANSETRON 4 MG: 4 TABLET, ORALLY DISINTEGRATING ORAL at 23:35

## 2017-11-03 ENCOUNTER — APPOINTMENT (OUTPATIENT)
Dept: CT IMAGING | Facility: HOSPITAL | Age: 37
End: 2017-11-03

## 2017-11-03 VITALS
HEART RATE: 85 BPM | DIASTOLIC BLOOD PRESSURE: 94 MMHG | WEIGHT: 195 LBS | HEIGHT: 66 IN | OXYGEN SATURATION: 99 % | RESPIRATION RATE: 16 BRPM | TEMPERATURE: 98.1 F | BODY MASS INDEX: 31.34 KG/M2 | SYSTOLIC BLOOD PRESSURE: 158 MMHG

## 2017-11-03 LAB
ALBUMIN SERPL-MCNC: 3.8 G/DL (ref 3.5–5.2)
ALBUMIN/GLOB SERPL: 1.5 G/DL
ALP SERPL-CCNC: 52 U/L (ref 40–129)
ALT SERPL W P-5'-P-CCNC: 15 U/L (ref 5–33)
ANION GAP SERPL CALCULATED.3IONS-SCNC: 12.3 MMOL/L
AST SERPL-CCNC: 13 U/L (ref 5–32)
BASOPHILS # BLD AUTO: 0.06 10*3/MM3 (ref 0–0.2)
BASOPHILS NFR BLD AUTO: 0.4 % (ref 0–2)
BILIRUB SERPL-MCNC: 0.2 MG/DL (ref 0.2–1.2)
BUN BLD-MCNC: 7 MG/DL (ref 6–20)
BUN/CREAT SERPL: 8.8 (ref 7–25)
CALCIUM SPEC-SCNC: 8.8 MG/DL (ref 8.6–10.5)
CHLORIDE SERPL-SCNC: 101 MMOL/L (ref 98–107)
CO2 SERPL-SCNC: 24.7 MMOL/L (ref 22–29)
CREAT BLD-MCNC: 0.8 MG/DL (ref 0.57–1)
DEPRECATED RDW RBC AUTO: 41.5 FL (ref 37–54)
EOSINOPHIL # BLD AUTO: 0.42 10*3/MM3 (ref 0.1–0.3)
EOSINOPHIL NFR BLD AUTO: 2.9 % (ref 0–4)
ERYTHROCYTE [DISTWIDTH] IN BLOOD BY AUTOMATED COUNT: 12.8 % (ref 11.5–14.5)
GFR SERPL CREATININE-BSD FRML MDRD: 82 ML/MIN/1.73
GLOBULIN UR ELPH-MCNC: 2.5 GM/DL
GLUCOSE BLD-MCNC: 113 MG/DL (ref 65–99)
HCT VFR BLD AUTO: 37 % (ref 37–47)
HGB BLD-MCNC: 12.7 G/DL (ref 12–16)
IMM GRANULOCYTES # BLD: 0.05 10*3/MM3 (ref 0–0.03)
IMM GRANULOCYTES NFR BLD: 0.3 % (ref 0–0.5)
LYMPHOCYTES # BLD AUTO: 5.67 10*3/MM3 (ref 0.6–4.8)
LYMPHOCYTES NFR BLD AUTO: 38.5 % (ref 20–45)
MCH RBC QN AUTO: 30.3 PG (ref 27–31)
MCHC RBC AUTO-ENTMCNC: 34.3 G/DL (ref 31–37)
MCV RBC AUTO: 88.3 FL (ref 81–99)
MONOCYTES # BLD AUTO: 0.81 10*3/MM3 (ref 0–1)
MONOCYTES NFR BLD AUTO: 5.5 % (ref 3–8)
NEUTROPHILS # BLD AUTO: 7.72 10*3/MM3 (ref 1.5–8.3)
NEUTROPHILS NFR BLD AUTO: 52.4 % (ref 45–70)
NRBC BLD MANUAL-RTO: 0 /100 WBC (ref 0–0)
PLATELET # BLD AUTO: 235 10*3/MM3 (ref 140–500)
PMV BLD AUTO: 10 FL (ref 7.4–10.4)
POTASSIUM BLD-SCNC: 3.4 MMOL/L (ref 3.5–5.2)
PROT SERPL-MCNC: 6.3 G/DL (ref 6–8.5)
RBC # BLD AUTO: 4.19 10*6/MM3 (ref 4.2–5.4)
SODIUM BLD-SCNC: 138 MMOL/L (ref 136–145)
WBC NRBC COR # BLD: 14.73 10*3/MM3 (ref 4.8–10.8)

## 2017-11-03 PROCEDURE — 25010000002 ONDANSETRON PER 1 MG: Performed by: EMERGENCY MEDICINE

## 2017-11-03 PROCEDURE — 72129 CT CHEST SPINE W/DYE: CPT

## 2017-11-03 PROCEDURE — 96375 TX/PRO/DX INJ NEW DRUG ADDON: CPT

## 2017-11-03 PROCEDURE — 0 IOPAMIDOL PER 1 ML: Performed by: EMERGENCY MEDICINE

## 2017-11-03 RX ORDER — ONDANSETRON 2 MG/ML
4 INJECTION INTRAMUSCULAR; INTRAVENOUS ONCE
Status: COMPLETED | OUTPATIENT
Start: 2017-11-03 | End: 2017-11-03

## 2017-11-03 RX ORDER — CLINDAMYCIN HYDROCHLORIDE 300 MG/1
300 CAPSULE ORAL 4 TIMES DAILY
Qty: 28 CAPSULE | Refills: 0 | Status: SHIPPED | OUTPATIENT
Start: 2017-11-03 | End: 2017-11-10

## 2017-11-03 RX ADMIN — ONDANSETRON 4 MG: 2 INJECTION, SOLUTION INTRAMUSCULAR; INTRAVENOUS at 01:13

## 2017-11-03 RX ADMIN — IOPAMIDOL 100 ML: 755 INJECTION, SOLUTION INTRAVENOUS at 00:21

## 2017-11-03 NOTE — DISCHARGE INSTRUCTIONS
Take the clindamycin antibiotic as directed until completed.  Please return to the emergency room for any worsening pain, swelling, redness, fevers, drainage, weakness, numbness, difficulties breathing or any other concerns.

## 2017-11-03 NOTE — ED PROVIDER NOTES
Subjective   History of Present Illness  History of Present Illness    Chief complaint: Back pain, wound check    Location: Mid back    Quality/Severity:  Moderate pain    Timing/Duration: Worsening over the past week    Modifying Factors: Worse with movement or contact    Narrative: This patient returns to the emergency room for repeat evaluation of wound concerns in her back.  Almost 2 weeks ago she had a dermatologic biopsy procedure to her low thoracic spine region for a melanoma lesion.  Afterward she was placed on a course of Keflex prophylactically which she has been taking.  She finished that an initial course of Keflex.  She came here for 1 evaluation shortly after the procedure because of concerns about possible wound infection with drainage and tenderness in the back.  I reassured her that time that the wound did not look infected to me and encouraged her to continue her course of Keflex and follow-up with her dermatologist.  I considered adding Bactrim to her regimen but she had an allergy to Bactrim and therefore I deferred any further clinical decisions to her dermatologist.  A couple days later, she went to [facility] with the same concern.  They refilled her Keflex prescription.  Then the patient followed up with her dermatologist who changed her to doxycycline and gave her prescription.  She tried to get that antibiotic filled at the pharmacy but unfortunately her insurance would not cover it and she could not afford it independently.  She returns again tonight for concerns of worsening back pain symptoms and possible wound infection.  She says she has been having drainage from the wound.  She thinks she may have been running some low-grade fevers.  She reports some nausea but no vomiting.  She says the pain is worse with movement or position changes.    Associated Symptoms: As above    Review of Systems   Constitutional: Positive for activity change, appetite change and fever.  Negative for fatigue.   HENT: Negative.    Respiratory: Negative for shortness of breath.    Cardiovascular: Negative for chest pain.   Gastrointestinal: Positive for nausea. Negative for abdominal pain and diarrhea.   Musculoskeletal: Positive for back pain and myalgias.   Skin: Positive for wound. Negative for color change.   Neurological: Negative for seizures, syncope, numbness and headaches.   All other systems reviewed and are negative.      Past Medical History:   Diagnosis Date   • Anxiety    • Asthma    • Depression    • Endometriosis    • GERD (gastroesophageal reflux disease)    • Hiatal hernia    • Hiatal hernia    • Hyperlipidemia    • Hypertension    • Injury of back    • Leg pain    • Leukocytosis, likely related to tobacco use    • Liver cyst    • Melanoma     Superficial spreading lentiginous, right lower extremity   • Migraines        Allergies   Allergen Reactions   • Bactrim  [Sulfamethoxazole-Trimethoprim]    • Hydrocodone    • Lisinopril    • Lyrica [Pregabalin]    • Meperidine    • Morphine    • Other Swelling     Erythromycin ophthalmic   • Tramadol        Past Surgical History:   Procedure Laterality Date   • APPENDECTOMY     •  SECTION     • CHOLECYSTECTOMY     • COLONOSCOPY     • HEMORRHOIDECTOMY     • HYSTEROSCOPY ENDOMETRIAL ABLATION     • OVARIAN CYST REMOVAL Left     Benign tumor   • OVARY SURGERY Left    • PELVIC LAPAROSCOPY     • SKIN BIOPSY     • SKIN CANCER EXCISION Right     Leg melanoma   • TUBAL ABDOMINAL LIGATION         Family History   Problem Relation Age of Onset   • Cervical cancer Mother 26   • Skin cancer Mother 54     Basal cell   • Diabetes Mother    • Skin cancer Father 53     Non-melanoma   • Heart disease Father    • Hypertension Father    • Other Daughter      Enlarged spleen   • Brain cancer Other 60   • Pancreatic cancer Other    • Diabetes Other    • Heart disease Other    • Hypertension Other        Social History     Social History   • Marital  status:      Spouse name: N/A   • Number of children: 2   • Years of education: 12     Occupational History   •  Unemployed     Social History Main Topics   • Smoking status: Current Every Day Smoker     Packs/day: 0.50     Years: 10.00     Types: Cigarettes   • Smokeless tobacco: None   • Alcohol use No   • Drug use: No   • Sexual activity: Defer     Other Topics Concern   • None     Social History Narrative       ED Triage Vitals   Temp Heart Rate Resp BP SpO2   11/02/17 2259 11/02/17 2259 11/02/17 2259 11/02/17 2259 11/02/17 2259   98.1 °F (36.7 °C) 90 16 168/104 99 %      Temp src Heart Rate Source Patient Position BP Location FiO2 (%)   11/02/17 2259 11/02/17 2259 11/02/17 2259 11/02/17 2259 --   Oral Monitor Sitting Right arm          Objective   Physical Exam   Constitutional: She is oriented to person, place, and time. She appears well-developed and well-nourished. No distress.   HENT:   Head: Normocephalic and atraumatic.   Eyes: EOM are normal. Pupils are equal, round, and reactive to light. Right eye exhibits no discharge. Left eye exhibits no discharge.   Neck: Normal range of motion. Neck supple.   Cardiovascular: Normal rate and intact distal pulses.    Pulmonary/Chest: Effort normal. No respiratory distress. She has no wheezes.   Abdominal: Soft. She exhibits no mass. There is no tenderness. There is no rebound and no guarding. No hernia.   Musculoskeletal: Normal range of motion. She exhibits tenderness. She exhibits no edema or deformity.   The low thoracic back shows one area of post procedural scar tissue and granulation tissue near the midline spine at the T11, T12 region.  There is some mild discharge present in this wound.  No foul order is noted.  There is minimal erythematous changes along the circumference of this wound.  The wound itself is about 2 or 3 cm in diameter.  There is diffuse soft tissue tenderness noted both above and below this wound extending several inches away.  No  significant induration or fluctuance are apparent to deep palpation.   Neurological: She is alert and oriented to person, place, and time.   Skin: Skin is warm and dry. No rash noted. She is not diaphoretic. No erythema. No pallor.   Psychiatric: She has a normal mood and affect. Her behavior is normal. Judgment and thought content normal.   Nursing note and vitals reviewed.    Results for orders placed or performed during the hospital encounter of 11/02/17   Comprehensive Metabolic Panel   Result Value Ref Range    Glucose 113 (H) 65 - 99 mg/dL    BUN 7 6 - 20 mg/dL    Creatinine 0.80 0.57 - 1.00 mg/dL    Sodium 138 136 - 145 mmol/L    Potassium 3.4 (L) 3.5 - 5.2 mmol/L    Chloride 101 98 - 107 mmol/L    CO2 24.7 22.0 - 29.0 mmol/L    Calcium 8.8 8.6 - 10.5 mg/dL    Total Protein 6.3 6.0 - 8.5 g/dL    Albumin 3.80 3.50 - 5.20 g/dL    ALT (SGPT) 15 5 - 33 U/L    AST (SGOT) 13 5 - 32 U/L    Alkaline Phosphatase 52 40 - 129 U/L    Total Bilirubin 0.2 0.2 - 1.2 mg/dL    eGFR Non African Amer 82 >60 mL/min/1.73    Globulin 2.5 gm/dL    A/G Ratio 1.5 g/dL    BUN/Creatinine Ratio 8.8 7.0 - 25.0    Anion Gap 12.3 mmol/L   CBC Auto Differential   Result Value Ref Range    WBC 14.73 (H) 4.80 - 10.80 10*3/mm3    RBC 4.19 (L) 4.20 - 5.40 10*6/mm3    Hemoglobin 12.7 12.0 - 16.0 g/dL    Hematocrit 37.0 37.0 - 47.0 %    MCV 88.3 81.0 - 99.0 fL    MCH 30.3 27.0 - 31.0 pg    MCHC 34.3 31.0 - 37.0 g/dL    RDW 12.8 11.5 - 14.5 %    RDW-SD 41.5 37.0 - 54.0 fl    MPV 10.0 7.4 - 10.4 fL    Platelets 235 140 - 500 10*3/mm3    Neutrophil % 52.4 45.0 - 70.0 %    Lymphocyte % 38.5 20.0 - 45.0 %    Monocyte % 5.5 3.0 - 8.0 %    Eosinophil % 2.9 0.0 - 4.0 %    Basophil % 0.4 0.0 - 2.0 %    Immature Grans % 0.3 0.0 - 0.5 %    Neutrophils, Absolute 7.72 1.50 - 8.30 10*3/mm3    Lymphocytes, Absolute 5.67 (H) 0.60 - 4.80 10*3/mm3    Monocytes, Absolute 0.81 0.00 - 1.00 10*3/mm3    Eosinophils, Absolute 0.42 (H) 0.10 - 0.30 10*3/mm3    Basophils,  Absolute 0.06 0.00 - 0.20 10*3/mm3    Immature Grans, Absolute 0.05 (H) 0.00 - 0.03 10*3/mm3    nRBC 0.0 0.0 - 0.0 /100 WBC       RADIOLOGY        Study: CT thoracic spine with contrast    Findings: Skin thickening and subcutaneous fat cellulitic changes near the L1 level of the back superficially.  No abscess    Interpreted contemporaneously with treatment by Dr. Carrero, independently viewed by me        Procedures         ED Course  ED Course   Comment By Time   I have reviewed the labs and the CT scan report.  Findings are consistent with cellulitis.  Fortunately I was able to access her recent microbiology results from her last ED visit.  Swabs at that time indicated MRSA organism.  Therefore Keflex would not be adequate treatment for her infection concern.  I gave her one dose of IV vancomycin here.  I'll change her to clindamycin which hopefully should be covered by her insurance plan in an affordable manner.  I advised her to take the clindamycin as directed until completed.  I also advised her to follow up with Dr. Lieberman as soon as possible for repeat evaluation.  I encouraged her to return to the emergency room if her symptoms did not seem to be improving within the next 2-3 days.  She agreed to do so. Yosi Mathew MD 11/03 0116                  MDM  Number of Diagnoses or Management Options  Cellulitis of skin of back:   Wound infection:      Amount and/or Complexity of Data Reviewed  Clinical lab tests: reviewed and ordered  Tests in the radiology section of CPT®: ordered and reviewed  Decide to obtain previous medical records or to obtain history from someone other than the patient: yes  Review and summarize past medical records: yes    Risk of Complications, Morbidity, and/or Mortality  Presenting problems: moderate  Diagnostic procedures: moderate  Management options: moderate        Final diagnoses:   Cellulitis of skin of back   Wound infection            Yosi Mathew MD  11/03/17 0119

## 2018-11-29 ENCOUNTER — LAB (OUTPATIENT)
Dept: LAB | Facility: HOSPITAL | Age: 38
End: 2018-11-29

## 2018-11-29 ENCOUNTER — OFFICE VISIT (OUTPATIENT)
Dept: ONCOLOGY | Facility: CLINIC | Age: 38
End: 2018-11-29

## 2018-11-29 VITALS
RESPIRATION RATE: 18 BRPM | HEART RATE: 89 BPM | DIASTOLIC BLOOD PRESSURE: 88 MMHG | HEIGHT: 66 IN | OXYGEN SATURATION: 97 % | TEMPERATURE: 98.2 F | SYSTOLIC BLOOD PRESSURE: 136 MMHG | BODY MASS INDEX: 26.71 KG/M2 | WEIGHT: 166.2 LBS

## 2018-11-29 DIAGNOSIS — D72.829 LEUKOCYTOSIS, UNSPECIFIED TYPE: Primary | ICD-10-CM

## 2018-11-29 LAB
BASOPHILS # BLD AUTO: 0.06 10*3/MM3 (ref 0–0.2)
BASOPHILS NFR BLD AUTO: 0.4 % (ref 0–2)
DEPRECATED RDW RBC AUTO: 39.3 FL (ref 37–54)
EOSINOPHIL # BLD AUTO: 0.32 10*3/MM3 (ref 0.1–0.3)
EOSINOPHIL NFR BLD AUTO: 2.3 % (ref 0–4)
ERYTHROCYTE [DISTWIDTH] IN BLOOD BY AUTOMATED COUNT: 12.5 % (ref 11.5–14.5)
HCT VFR BLD AUTO: 43 % (ref 37–47)
HGB BLD-MCNC: 15.2 G/DL (ref 12–16)
IMM GRANULOCYTES # BLD: 0.04 10*3/MM3 (ref 0–0.03)
IMM GRANULOCYTES NFR BLD: 0.3 % (ref 0–0.5)
LYMPHOCYTES # BLD AUTO: 2.94 10*3/MM3 (ref 0.6–4.8)
LYMPHOCYTES NFR BLD AUTO: 21.3 % (ref 20–45)
MCH RBC QN AUTO: 30.5 PG (ref 27–31)
MCHC RBC AUTO-ENTMCNC: 35.3 G/DL (ref 31–37)
MCV RBC AUTO: 86.3 FL (ref 81–99)
MONOCYTES # BLD AUTO: 0.82 10*3/MM3 (ref 0–1)
MONOCYTES NFR BLD AUTO: 5.9 % (ref 3–8)
NEUTROPHILS # BLD AUTO: 9.61 10*3/MM3 (ref 1.5–8.3)
NEUTROPHILS NFR BLD AUTO: 69.8 % (ref 45–70)
NRBC BLD MANUAL-RTO: 0 /100 WBC (ref 0–0)
PLATELET # BLD AUTO: 271 10*3/MM3 (ref 140–500)
PMV BLD AUTO: 9.8 FL (ref 7.4–10.4)
RBC # BLD AUTO: 4.98 10*6/MM3 (ref 4.2–5.4)
WBC NRBC COR # BLD: 13.79 10*3/MM3 (ref 4.8–10.8)

## 2018-11-29 PROCEDURE — 36415 COLL VENOUS BLD VENIPUNCTURE: CPT | Performed by: INTERNAL MEDICINE

## 2018-11-29 PROCEDURE — 85025 COMPLETE CBC W/AUTO DIFF WBC: CPT | Performed by: INTERNAL MEDICINE

## 2018-11-29 PROCEDURE — 99213 OFFICE O/P EST LOW 20 MIN: CPT | Performed by: INTERNAL MEDICINE

## 2018-11-29 RX ORDER — AMLODIPINE BESYLATE 10 MG/1
20 TABLET ORAL DAILY
COMMUNITY

## 2018-11-29 NOTE — PROGRESS NOTES
History:     Reason for follow up:   1. Leukocytosis, likely related to tobacco use.   2. Atypical nevus     HPI:  Emmy Dietz  is seen today for follow-up of chronic leukocytosis.  I have never met the patient that reviewed her labs.  She has a chronic leukocytosis present at least 3 years with WBC count typically ranging in the 13,000 range with mild neutrophilia.  The patient continues to smoke tobacco.  She has history of atypical skin nevus and is followed closely by dermatology.  She states recent abnormal mammography under investigation by her PCP.    Past Medical   Past Medical History:   Diagnosis Date   • Anxiety    • Asthma    • Depression    • Endometriosis    • GERD (gastroesophageal reflux disease)    • Hiatal hernia    • Hiatal hernia    • Hyperlipidemia    • Hypertension    • Injury of back    • Leg pain    • Leukocytosis, likely related to tobacco use    • Liver cyst    • Melanoma (CMS/HCC)     Superficial spreading lentiginous, right lower extremity   • Migraines     and   Patient Active Problem List   Diagnosis   • Leukocytosis     Social History   Social History     Socioeconomic History   • Marital status:      Spouse name: Not on file   • Number of children: 2   • Years of education: 12   • Highest education level: Not on file   Social Needs   • Financial resource strain: Not on file   • Food insecurity - worry: Not on file   • Food insecurity - inability: Not on file   • Transportation needs - medical: Not on file   • Transportation needs - non-medical: Not on file   Occupational History     Employer: UNEMPLOYED   Tobacco Use   • Smoking status: Current Every Day Smoker     Packs/day: 0.50     Years: 10.00     Pack years: 5.00     Types: Cigarettes   Substance and Sexual Activity   • Alcohol use: No   • Drug use: No   • Sexual activity: Defer   Other Topics Concern   • Not on file   Social History Narrative   • Not on file     Family History  Family History   Problem Relation Age  "of Onset   • Cervical cancer Mother 26   • Skin cancer Mother 54        Basal cell   • Diabetes Mother    • Skin cancer Father 53        Non-melanoma   • Heart disease Father    • Hypertension Father    • Other Daughter         Enlarged spleen   • Brain cancer Other 60   • Pancreatic cancer Other    • Diabetes Other    • Heart disease Other    • Hypertension Other      Allergies  Allergies   Allergen Reactions   • Bactrim  [Sulfamethoxazole-Trimethoprim]    • Hydrocodone    • Lisinopril    • Lyrica [Pregabalin]    • Meperidine Hives   • Morphine    • Other Swelling     Erythromycin ophthalmic   • Tramadol        Medications: The current medication list was reviewed in the EMR.    Review of Systems  GENERAL: No change in appetite or weight; No fevers, chills, sweats.    SKIN: No nonhealing lesions. Complains of rash on arms  HEME/LYMPH: No easy bruising, bleeding. No swollen nodes.   EYES: No vision changes or diplopia.   ENT: no thrush  RESPIRATORY: No cough, shortness of breath, hemoptysis or wheezing.   BREAST: complains recent breast infection, improved with antibiotics from PCP        Objective    Objective:     Vitals:    11/29/18 0900   BP: 136/88   Pulse: 89   Resp: 18   Temp: 98.2 °F (36.8 °C)   SpO2: 97%   Weight: 75.4 kg (166 lb 3.2 oz)   Height: 167 cm (65.75\")   PainSc:   4   PainLoc: Back  Comment: both breast in addition     GENERAL:  Well-developed, well-nourished female in no acute distress.   SKIN:  Warm, dry without purpura or petechiae.  LYMPHATICS:  No cervical, supraclavicular, axillary adenopathy.  CHEST:  Lungs clear to percussion and auscultation. Good airflow.  CARDIAC:  Regular rate and rhythm without murmurs, rubs or gallops. Normal S1,S2. No edema  EXTREMITIES:  No clubbing, cyanosis or edema.  PSYCHIATRIC:  Anxious appearing    Labs and Imaging  Results for orders placed or performed in visit on 11/29/18   CBC Auto Differential   Result Value Ref Range    WBC 13.79 (H) 4.80 - 10.80 " 10*3/mm3    RBC 4.98 4.20 - 5.40 10*6/mm3    Hemoglobin 15.2 12.0 - 16.0 g/dL    Hematocrit 43.0 37.0 - 47.0 %    MCV 86.3 81.0 - 99.0 fL    MCH 30.5 27.0 - 31.0 pg    MCHC 35.3 31.0 - 37.0 g/dL    RDW 12.5 11.5 - 14.5 %    RDW-SD 39.3 37.0 - 54.0 fl    MPV 9.8 7.4 - 10.4 fL    Platelets 271 140 - 500 10*3/mm3    Neutrophil % 69.8 45.0 - 70.0 %    Lymphocyte % 21.3 20.0 - 45.0 %    Monocyte % 5.9 3.0 - 8.0 %    Eosinophil % 2.3 0.0 - 4.0 %    Basophil % 0.4 0.0 - 2.0 %    Immature Grans % 0.3 0.0 - 0.5 %    Neutrophils, Absolute 9.61 (H) 1.50 - 8.30 10*3/mm3    Lymphocytes, Absolute 2.94 0.60 - 4.80 10*3/mm3    Monocytes, Absolute 0.82 0.00 - 1.00 10*3/mm3    Eosinophils, Absolute 0.32 (H) 0.10 - 0.30 10*3/mm3    Basophils, Absolute 0.06 0.00 - 0.20 10*3/mm3    Immature Grans, Absolute 0.04 (H) 0.00 - 0.03 10*3/mm3    nRBC 0.0 0.0 - 0.0 /100 WBC         Assessment/Plan   Assessment/Plan:   1. Leukocytosis.  Patient has a chronic mild leukocytosis with neutrophilia, typically the WBC 13,000 range.  This has been present since at least 2015.  I suspect this is a mild inflammatory response to her chronic tobacco use.  This does not look consistent with a myeloproliferative disorder.    2. 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   Followed closely by derm.    3.  atypical compound nevus.  Followed closely by derm    4.  Tobacco abuse: I recommended that she stop smoking    5.  She requested to be seen with her genetics Department secondary to a strong family history of breast and also ovarian cancer.  I placed a referral.    She may return as needed.

## 2018-12-06 ENCOUNTER — OFFICE VISIT (OUTPATIENT)
Dept: GASTROENTEROLOGY | Facility: CLINIC | Age: 38
End: 2018-12-06

## 2018-12-06 VITALS — BODY MASS INDEX: 28.19 KG/M2 | WEIGHT: 175.4 LBS | HEIGHT: 66 IN

## 2018-12-06 DIAGNOSIS — K21.9 GASTROESOPHAGEAL REFLUX DISEASE, ESOPHAGITIS PRESENCE NOT SPECIFIED: Primary | ICD-10-CM

## 2018-12-06 DIAGNOSIS — R10.10 PAIN OF UPPER ABDOMEN: ICD-10-CM

## 2018-12-06 PROCEDURE — 99203 OFFICE O/P NEW LOW 30 MIN: CPT | Performed by: INTERNAL MEDICINE

## 2018-12-06 RX ORDER — DICYCLOMINE HYDROCHLORIDE 10 MG/1
10 CAPSULE ORAL
Qty: 90 CAPSULE | Refills: 2 | Status: SHIPPED | OUTPATIENT
Start: 2018-12-06 | End: 2019-08-01 | Stop reason: SDUPTHER

## 2018-12-06 RX ORDER — SUCRALFATE 1 G/1
1 TABLET ORAL 4 TIMES DAILY
Qty: 90 TABLET | Refills: 3 | Status: SHIPPED | OUTPATIENT
Start: 2018-12-06 | End: 2019-08-01 | Stop reason: SDUPTHER

## 2018-12-06 NOTE — PROGRESS NOTES
PATIENT INFORMATION  Emmy Dietz       - 1980    CHIEF COMPLAINT  Chief Complaint   Patient presents with   • Abdominal Pain   • Heartburn       HISTORY OF PRESENT ILLNESS  HPI    39 yo with intermittent epigastric pain for the past 6 months. Symptoms worse at night, often wakes up at night with pain. She was on ppi 1 year ago but dropped out of seeing doctors. pcp started omeprazole about one month ago. There is only minimal relief with this.   No nsaid use.  Last egd was 3 years ago. She recalls treatment for h.pylori.  No dysphagia or odynophagia.  She is s/p cholecystectomy.  I did her cls in 2015 and removed hyperplastic polyp. She has family history of colon polyps and colon cancer.  She does not have any changes in bowel habits or blood in the stool. She was on bentyl previously and stopped this also. She is wanting to restart this.    REVIEW OF SYSTEMS  Review of Systems   Constitutional: Positive for appetite change and unexpected weight change.   Respiratory: Positive for cough.    Gastrointestinal: Positive for abdominal pain and vomiting.        Reflux   Endocrine: Positive for polydipsia.   Genitourinary: Positive for pelvic pain.   Musculoskeletal: Positive for back pain and myalgias.   All other systems reviewed and are negative.        ACTIVE PROBLEMS  Patient Active Problem List    Diagnosis   • Gastroesophageal reflux disease [K21.9]   • Pain of upper abdomen [R10.10]   • Leukocytosis [D72.829]         PAST MEDICAL HISTORY  Past Medical History:   Diagnosis Date   • Anxiety    • Asthma    • Colon polyp    • Depression    • Endometriosis    • GERD (gastroesophageal reflux disease)    • Hiatal hernia    • Hiatal hernia    • Hyperlipidemia    • Hypertension    • Injury of back    • Leg pain    • Leukocytosis, likely related to tobacco use    • Liver cyst    • Melanoma (CMS/HCC)     Superficial spreading lentiginous, right lower extremity   • Migraines          SURGICAL HISTORY  Past  Surgical History:   Procedure Laterality Date   • APPENDECTOMY     •  SECTION     • CHOLECYSTECTOMY     • COLONOSCOPY     • HEMORRHOIDECTOMY     • HYSTEROSCOPY ENDOMETRIAL ABLATION     • OVARIAN CYST REMOVAL Left     Benign tumor   • OVARY SURGERY Left    • PELVIC LAPAROSCOPY     • SKIN BIOPSY     • SKIN CANCER EXCISION Right     Leg melanoma   • TUBAL ABDOMINAL LIGATION           FAMILY HISTORY  Family History   Problem Relation Age of Onset   • Cervical cancer Mother 26   • Skin cancer Mother 54        Basal cell   • Diabetes Mother    • Colon polyps Mother    • Skin cancer Father 53        Non-melanoma   • Heart disease Father    • Hypertension Father    • Other Daughter         Enlarged spleen   • Brain cancer Other 60   • Pancreatic cancer Other    • Diabetes Other    • Heart disease Other    • Hypertension Other    • Colon cancer Maternal Uncle          SOCIAL HISTORY  Social History     Occupational History     Employer: UNEMPLOYED   Tobacco Use   • Smoking status: Current Every Day Smoker     Packs/day: 0.50     Years: 10.00     Pack years: 5.00     Types: Cigarettes   Substance and Sexual Activity   • Alcohol use: No   • Drug use: No   • Sexual activity: Defer       Debilities/Disabilities Identified: None    Emotional Behavior: Appropriate    CURRENT MEDICATIONS    Current Outpatient Medications:   •  albuterol (PROVENTIL) (2.5 MG/3ML) 0.083% nebulizer solution, Take 2.5 mg by nebulization Every 4 (Four) Hours As Needed for Wheezing., Disp: , Rfl:   •  albuterol (VENTOLIN HFA) 108 (90 BASE) MCG/ACT inhaler, Ventolin  (90 Base) MCG/ACT Inhalation Aerosol Solution; Patient Sig: Ventolin  (90 Base) MCG/ACT Inhalation Aerosol Solution ; 0; 03-Mar-2015; Active, Disp: , Rfl:   •  amLODIPine (NORVASC) 5 MG tablet, Take 5 mg by mouth Daily., Disp: , Rfl:   •  dicyclomine (BENTYL) 10 MG capsule, Take 1 capsule by mouth 4 (Four) Times a Day Before Meals & at Bedtime., Disp: 90 capsule, Rfl:  "2  •  meclizine (ANTIVERT) 25 MG tablet, Take 1 tablet by mouth 4 (Four) Times a Day As Needed for dizziness., Disp: 30 tablet, Rfl: 0  •  omeprazole (priLOSEC) 40 MG capsule, , Disp: , Rfl:   •  sertraline (ZOLOFT) 100 MG tablet, Take 100 mg by mouth Daily., Disp: , Rfl:   •  sucralfate (CARAFATE) 1 g tablet, Take 1 tablet by mouth 4 (Four) Times a Day., Disp: 90 tablet, Rfl: 3    ALLERGIES  Bactrim  [sulfamethoxazole-trimethoprim]; Hydrocodone; Lisinopril; Lyrica [pregabalin]; Meperidine; Morphine; Other; and Tramadol    VITALS  Vitals:    12/06/18 0942   Weight: 79.6 kg (175 lb 6.4 oz)   Height: 167 cm (65.75\")       LAST RESULTS   Lab on 11/29/2018   Component Date Value Ref Range Status   • WBC 11/29/2018 13.79* 4.80 - 10.80 10*3/mm3 Final   • RBC 11/29/2018 4.98  4.20 - 5.40 10*6/mm3 Final   • Hemoglobin 11/29/2018 15.2  12.0 - 16.0 g/dL Final   • Hematocrit 11/29/2018 43.0  37.0 - 47.0 % Final   • MCV 11/29/2018 86.3  81.0 - 99.0 fL Final   • MCH 11/29/2018 30.5  27.0 - 31.0 pg Final   • MCHC 11/29/2018 35.3  31.0 - 37.0 g/dL Final   • RDW 11/29/2018 12.5  11.5 - 14.5 % Final   • RDW-SD 11/29/2018 39.3  37.0 - 54.0 fl Final   • MPV 11/29/2018 9.8  7.4 - 10.4 fL Final   • Platelets 11/29/2018 271  140 - 500 10*3/mm3 Final   • Neutrophil % 11/29/2018 69.8  45.0 - 70.0 % Final   • Lymphocyte % 11/29/2018 21.3  20.0 - 45.0 % Final   • Monocyte % 11/29/2018 5.9  3.0 - 8.0 % Final   • Eosinophil % 11/29/2018 2.3  0.0 - 4.0 % Final   • Basophil % 11/29/2018 0.4  0.0 - 2.0 % Final   • Immature Grans % 11/29/2018 0.3  0.0 - 0.5 % Final   • Neutrophils, Absolute 11/29/2018 9.61* 1.50 - 8.30 10*3/mm3 Final   • Lymphocytes, Absolute 11/29/2018 2.94  0.60 - 4.80 10*3/mm3 Final   • Monocytes, Absolute 11/29/2018 0.82  0.00 - 1.00 10*3/mm3 Final   • Eosinophils, Absolute 11/29/2018 0.32* 0.10 - 0.30 10*3/mm3 Final   • Basophils, Absolute 11/29/2018 0.06  0.00 - 0.20 10*3/mm3 Final   • Immature Grans, Absolute 11/29/2018 " 0.04* 0.00 - 0.03 10*3/mm3 Final   • nRBC 11/29/2018 0.0  0.0 - 0.0 /100 WBC Final     No results found.    PHYSICAL EXAM  Physical Exam   Constitutional: She is oriented to person, place, and time. She appears well-developed and well-nourished. No distress.   HENT:   Head: Normocephalic and atraumatic.   Mouth/Throat: Oropharynx is clear and moist.   Eyes: EOM are normal. Pupils are equal, round, and reactive to light.   Neck: Normal range of motion. No tracheal deviation present.   Cardiovascular: Normal rate, regular rhythm, normal heart sounds and intact distal pulses. Exam reveals no gallop and no friction rub.   No murmur heard.  Pulmonary/Chest: Effort normal and breath sounds normal. No stridor. No respiratory distress. She has no wheezes. She has no rales. She exhibits no tenderness.   Abdominal: Soft. Bowel sounds are normal. She exhibits no distension. There is no tenderness. There is no rebound and no guarding.   Musculoskeletal: She exhibits no edema.   Lymphadenopathy:     She has no cervical adenopathy.   Neurological: She is alert and oriented to person, place, and time.   Skin: Skin is warm. She is not diaphoretic.   Psychiatric: She has a normal mood and affect. Her behavior is normal. Judgment and thought content normal.   Nursing note and vitals reviewed.      ASSESSMENT  Diagnoses and all orders for this visit:    Gastroesophageal reflux disease, esophagitis presence not specified  -     Case Request; Standing  -     Case Request    Pain of upper abdomen  -     Case Request; Standing  -     Case Request    Other orders  -     sucralfate (CARAFATE) 1 g tablet; Take 1 tablet by mouth 4 (Four) Times a Day.  -     dicyclomine (BENTYL) 10 MG capsule; Take 1 capsule by mouth 4 (Four) Times a Day Before Meals & at Bedtime.  -     Follow Anesthesia Guidelines / Standing Orders; Future  -     Implement Anesthesia orders day of procedure.; Standing  -     Obtain informed consent;  Standing          PLAN  No Follow-up on file.      Antireflux measures and dietary modifications reviewed. Low acid diet reviewed. Keep head of bed elevated. Stop eating/drinking at least 3 hours prior to bedtime. Eliminate caffeine and carbonated beverages.  Weight loss encouraged if BMI over 25.    Indications, risks and procedure were discussed with the patient, including but not limited to, bleeding, infection, possibility of perforation and possible polypectomy. All of the patient's questions were answered, and signed informed consent was obtained and placed on the chart.

## 2018-12-25 ENCOUNTER — ANESTHESIA EVENT (OUTPATIENT)
Dept: PERIOP | Facility: HOSPITAL | Age: 38
End: 2018-12-25

## 2018-12-28 ENCOUNTER — ANESTHESIA (OUTPATIENT)
Dept: PERIOP | Facility: HOSPITAL | Age: 38
End: 2018-12-28

## 2018-12-28 ENCOUNTER — HOSPITAL ENCOUNTER (OUTPATIENT)
Facility: HOSPITAL | Age: 38
Setting detail: HOSPITAL OUTPATIENT SURGERY
Discharge: HOME | End: 2018-12-28
Attending: INTERNAL MEDICINE | Admitting: INTERNAL MEDICINE

## 2018-12-28 VITALS
RESPIRATION RATE: 14 BRPM | HEIGHT: 66 IN | BODY MASS INDEX: 27.9 KG/M2 | DIASTOLIC BLOOD PRESSURE: 72 MMHG | HEART RATE: 58 BPM | OXYGEN SATURATION: 98 % | WEIGHT: 173.6 LBS | SYSTOLIC BLOOD PRESSURE: 113 MMHG | TEMPERATURE: 98 F

## 2018-12-28 DIAGNOSIS — R10.10 PAIN OF UPPER ABDOMEN: ICD-10-CM

## 2018-12-28 DIAGNOSIS — K21.9 GASTROESOPHAGEAL REFLUX DISEASE, ESOPHAGITIS PRESENCE NOT SPECIFIED: ICD-10-CM

## 2018-12-28 PROCEDURE — 88305 TISSUE EXAM BY PATHOLOGIST: CPT | Performed by: INTERNAL MEDICINE

## 2018-12-28 PROCEDURE — 43239 EGD BIOPSY SINGLE/MULTIPLE: CPT | Performed by: INTERNAL MEDICINE

## 2018-12-28 PROCEDURE — 25010000002 PROPOFOL 10 MG/ML EMULSION: Performed by: NURSE ANESTHETIST, CERTIFIED REGISTERED

## 2018-12-28 RX ORDER — SODIUM CHLORIDE 0.9 % (FLUSH) 0.9 %
3 SYRINGE (ML) INJECTION EVERY 12 HOURS SCHEDULED
Status: DISCONTINUED | OUTPATIENT
Start: 2018-12-28 | End: 2018-12-28 | Stop reason: HOSPADM

## 2018-12-28 RX ORDER — SODIUM CHLORIDE, SODIUM LACTATE, POTASSIUM CHLORIDE, CALCIUM CHLORIDE 600; 310; 30; 20 MG/100ML; MG/100ML; MG/100ML; MG/100ML
100 INJECTION, SOLUTION INTRAVENOUS CONTINUOUS
Status: DISCONTINUED | OUTPATIENT
Start: 2018-12-28 | End: 2018-12-28 | Stop reason: HOSPADM

## 2018-12-28 RX ORDER — PROPOFOL 10 MG/ML
VIAL (ML) INTRAVENOUS AS NEEDED
Status: DISCONTINUED | OUTPATIENT
Start: 2018-12-28 | End: 2018-12-28 | Stop reason: SURG

## 2018-12-28 RX ORDER — SODIUM CHLORIDE 9 MG/ML
40 INJECTION, SOLUTION INTRAVENOUS AS NEEDED
Status: DISCONTINUED | OUTPATIENT
Start: 2018-12-28 | End: 2018-12-28 | Stop reason: HOSPADM

## 2018-12-28 RX ORDER — SODIUM CHLORIDE 0.9 % (FLUSH) 0.9 %
1-10 SYRINGE (ML) INJECTION AS NEEDED
Status: DISCONTINUED | OUTPATIENT
Start: 2018-12-28 | End: 2018-12-28 | Stop reason: HOSPADM

## 2018-12-28 RX ORDER — SODIUM CHLORIDE, SODIUM LACTATE, POTASSIUM CHLORIDE, CALCIUM CHLORIDE 600; 310; 30; 20 MG/100ML; MG/100ML; MG/100ML; MG/100ML
9 INJECTION, SOLUTION INTRAVENOUS CONTINUOUS PRN
Status: DISCONTINUED | OUTPATIENT
Start: 2018-12-28 | End: 2018-12-28 | Stop reason: HOSPADM

## 2018-12-28 RX ORDER — ONDANSETRON 2 MG/ML
4 INJECTION INTRAMUSCULAR; INTRAVENOUS ONCE AS NEEDED
Status: DISCONTINUED | OUTPATIENT
Start: 2018-12-28 | End: 2018-12-28 | Stop reason: HOSPADM

## 2018-12-28 RX ORDER — LIDOCAINE HYDROCHLORIDE 20 MG/ML
INJECTION, SOLUTION INFILTRATION; PERINEURAL AS NEEDED
Status: DISCONTINUED | OUTPATIENT
Start: 2018-12-28 | End: 2018-12-28 | Stop reason: SURG

## 2018-12-28 RX ORDER — LIDOCAINE HYDROCHLORIDE 10 MG/ML
0.5 INJECTION, SOLUTION EPIDURAL; INFILTRATION; INTRACAUDAL; PERINEURAL ONCE AS NEEDED
Status: DISCONTINUED | OUTPATIENT
Start: 2018-12-28 | End: 2018-12-28 | Stop reason: HOSPADM

## 2018-12-28 RX ADMIN — PROPOFOL 50 MG: 10 INJECTION, EMULSION INTRAVENOUS at 09:51

## 2018-12-28 RX ADMIN — PROPOFOL 40 MG: 10 INJECTION, EMULSION INTRAVENOUS at 09:57

## 2018-12-28 RX ADMIN — PROPOFOL 40 MG: 10 INJECTION, EMULSION INTRAVENOUS at 09:55

## 2018-12-28 RX ADMIN — SODIUM CHLORIDE, POTASSIUM CHLORIDE, SODIUM LACTATE AND CALCIUM CHLORIDE: 600; 310; 30; 20 INJECTION, SOLUTION INTRAVENOUS at 08:57

## 2018-12-28 RX ADMIN — PROPOFOL 50 MG: 10 INJECTION, EMULSION INTRAVENOUS at 09:53

## 2018-12-28 RX ADMIN — PROPOFOL 20 MG: 10 INJECTION, EMULSION INTRAVENOUS at 10:00

## 2018-12-28 RX ADMIN — LIDOCAINE HYDROCHLORIDE 100 MG: 20 INJECTION, SOLUTION INFILTRATION; PERINEURAL at 09:51

## 2018-12-28 NOTE — ANESTHESIA POSTPROCEDURE EVALUATION
Patient: Emmy Dietz    Procedure Summary     Date:  12/28/18 Room / Location:  [facility] ENDOSCOPY 2 /  [facility] OR    Anesthesia Start:  0948 Anesthesia Stop:  1005    Procedure:  ESOPHAGOGASTRODUODENOSCOPY WITH BIOPSIES (N/A Esophagus) Diagnosis:       Gastroesophageal reflux disease, esophagitis presence not specified      Pain of upper abdomen      (Gastroesophageal reflux disease, esophagitis presence not specified [K21.9])      (Pain of upper abdomen [R10.10])    Surgeon:  Jazmyn Cullen MD Provider:  Clemencia Jones CRNA    Anesthesia Type:  MAC ASA Status:  2          Anesthesia Type: MAC  Last vitals  BP   113/72 (12/28/18 1040)   Temp   98 °F (36.7 °C) (12/28/18 1008)   Pulse   58 (12/28/18 1040)   Resp   14 (12/28/18 1040)     SpO2   98 % (12/28/18 1040)     Post Anesthesia Care and Evaluation    Patient location during evaluation: PHASE II  Patient participation: complete - patient participated  Level of consciousness: awake  Pain management: adequate  Airway patency: patent  Anesthetic complications: No anesthetic complications  PONV Status: none  Cardiovascular status: acceptable  Respiratory status: acceptable  Hydration status: acceptable

## 2018-12-28 NOTE — ANESTHESIA PREPROCEDURE EVALUATION
Anesthesia Evaluation     Patient summary reviewed and Nursing notes reviewed   history of anesthetic complications: PONV difficult airway prolonged sedation  NPO Solid Status: > 8 hours  NPO Liquid Status: > 8 hours           Airway   Mallampati: II  TM distance: >3 FB  Neck ROM: full  No difficulty expected  Dental      Pulmonary     breath sounds clear to auscultation  (+) a smoker (1/2ppd) Current Smoked day of surgery, asthma,   Cardiovascular   Exercise tolerance: good (4-7 METS)    ECG reviewed  Rhythm: regular  Rate: normal    (+) hypertension well controlled less than 2 medications, hyperlipidemia (boarderline),     ROS comment: Narrative     RR Interval= 857 ms  NJ Interval= 148 ms  QRSD Interval= 96 ms  QT Interval= 424 ms  QTc Interval= 458 ms  Heart Rate= 70 ms  P Axis= 43 deg  QRS Axis= 76 deg  T Wave Axis= 19 deg  I: 40 Axis= 15 deg  T: 40 Axis= 103 deg  ST Axis= 7 deg  SINUS RHYTHM  INFERIOR Q WAVES, PROBABLY NORMAL VARIATION  NO SIGNIFICANT CHANGE FROM PREVIOUS ECG  Electronically Signed by:  Abilio Mcarthur ([facility]) 06-Sep-2017 16:28:14  Date and Time of Study: 2017-09-05 22:39:59        Neuro/Psych  (+) headaches, psychiatric history Anxiety and Depression,     GI/Hepatic/Renal/Endo    (+)  hiatal hernia, GERD poorly controlled,  liver disease (cyst),     Musculoskeletal     (+) back pain,   Abdominal    Substance History   (+) alcohol use (occ),      OB/GYN          Other      history of cancer (melanoma )    ROS/Med Hx Other: HISTORY OF DIFFICULT AIRWAY                  Anesthesia Plan    ASA 2     MAC     intravenous induction   Anesthetic plan, all risks, benefits, and alternatives have been provided, discussed and informed consent has been obtained with: patient.  Use of blood products discussed with patient  Consented to blood products.

## 2018-12-31 LAB
CYTO UR: NORMAL
LAB AP CASE REPORT: NORMAL
PATH REPORT.FINAL DX SPEC: NORMAL
PATH REPORT.GROSS SPEC: NORMAL

## 2019-01-08 ENCOUNTER — OFFICE VISIT (OUTPATIENT)
Dept: GASTROENTEROLOGY | Facility: CLINIC | Age: 39
End: 2019-01-08

## 2019-01-08 VITALS — HEIGHT: 66 IN | WEIGHT: 176.4 LBS | BODY MASS INDEX: 28.35 KG/M2

## 2019-01-08 DIAGNOSIS — K62.5 RECTAL BLEEDING: Primary | ICD-10-CM

## 2019-01-08 DIAGNOSIS — R10.10 PAIN OF UPPER ABDOMEN: ICD-10-CM

## 2019-01-08 DIAGNOSIS — K21.9 GASTROESOPHAGEAL REFLUX DISEASE, ESOPHAGITIS PRESENCE NOT SPECIFIED: ICD-10-CM

## 2019-01-08 DIAGNOSIS — K58.8 OTHER IRRITABLE BOWEL SYNDROME: ICD-10-CM

## 2019-01-08 DIAGNOSIS — Z80.0 FAMILY HISTORY OF GI MALIGNANCY: ICD-10-CM

## 2019-01-08 PROCEDURE — 99214 OFFICE O/P EST MOD 30 MIN: CPT | Performed by: INTERNAL MEDICINE

## 2019-01-08 NOTE — PROGRESS NOTES
PATIENT INFORMATION  Emmy Dietz       - 1980    CHIEF COMPLAINT  Chief Complaint   Patient presents with   • Abdominal Pain   • Rectal Bleeding   • Heartburn       HISTORY OF PRESENT ILLNESS  HPI    She is here today in follow up after EGD. Pathology is reviewed wth her today. Gastritis but no h.pylori. Abdominal pain is intermittent and occurs twice weekly. No known aggravating or alleviating factors  She is on dicyclomine which does helps.  She drinks coke and sprite 2-3 daily.  Rectal bleeding is intermittent.  Last cls was in , hyperplastic polyps removed. Mom with colon polyps removed. Her brother has polyps.   Weight has been stable.  She is scheduled to see Dr. Larsen who is following her for her endometriosis.  REVIEW OF SYSTEMS  Review of Systems   Eyes: Positive for photophobia.   Cardiovascular: Positive for leg swelling.   Gastrointestinal: Positive for abdominal pain, anal bleeding and blood in stool.        Reflux   Endocrine: Positive for polydipsia.   Musculoskeletal: Positive for back pain and myalgias.   Psychiatric/Behavioral: Positive for sleep disturbance.   All other systems reviewed and are negative.        ACTIVE PROBLEMS  Patient Active Problem List    Diagnosis   • Gastroesophageal reflux disease [K21.9]   • Pain of upper abdomen [R10.10]   • Leukocytosis [D72.829]         PAST MEDICAL HISTORY  Past Medical History:   Diagnosis Date   • Anxiety    • Asthma    • Colon polyp    • Depression    • Endometriosis    • GERD (gastroesophageal reflux disease)    • Hard to intubate    • Hiatal hernia    • Hiatal hernia    • Hyperlipidemia    • Hypertension    • Injury of back    • Leg pain    • Leukocytosis, likely related to tobacco use    • Liver cyst    • Melanoma (CMS/HCC)     Superficial spreading lentiginous, right lower extremity   • Migraines    • PONV (postoperative nausea and vomiting)          SURGICAL HISTORY  Past Surgical History:   Procedure Laterality Date   •  APPENDECTOMY     •  SECTION     • CHOLECYSTECTOMY     • COLONOSCOPY     • ENDOSCOPY N/A 2018    Procedure: ESOPHAGOGASTRODUODENOSCOPY WITH BIOPSIES;  Surgeon: Jazmyn Cullen MD;  Location: [facility];  Service: Gastroenterology   • HEMORRHOIDECTOMY     • HYSTEROSCOPY ENDOMETRIAL ABLATION     • OVARIAN CYST REMOVAL Left     Benign tumor   • OVARY SURGERY Left    • PELVIC LAPAROSCOPY     • SKIN BIOPSY     • SKIN CANCER EXCISION Right     Leg melanoma   • TUBAL ABDOMINAL LIGATION           FAMILY HISTORY  Family History   Problem Relation Age of Onset   • Cervical cancer Mother 26   • Skin cancer Mother 54        Basal cell   • Diabetes Mother    • Colon polyps Mother    • Skin cancer Father 53        Non-melanoma   • Heart disease Father    • Hypertension Father    • Other Daughter         Enlarged spleen   • Brain cancer Other 60   • Pancreatic cancer Other    • Diabetes Other    • Heart disease Other    • Hypertension Other    • Colon cancer Maternal Uncle          SOCIAL HISTORY  Social History     Occupational History     Employer: UNEMPLOYED   Tobacco Use   • Smoking status: Current Every Day Smoker     Packs/day: 0.50     Years: 10.00     Pack years: 5.00     Types: Cigarettes   Substance and Sexual Activity   • Alcohol use: Yes     Comment: occassionaly   • Drug use: No   • Sexual activity: Defer       Debilities/Disabilities Identified: None    Emotional Behavior: Appropriate    CURRENT MEDICATIONS    Current Outpatient Medications:   •  albuterol (PROVENTIL) (2.5 MG/3ML) 0.083% nebulizer solution, Take 2.5 mg by nebulization Every 4 (Four) Hours As Needed for Wheezing., Disp: , Rfl:   •  albuterol (VENTOLIN HFA) 108 (90 BASE) MCG/ACT inhaler, Ventolin  (90 Base) MCG/ACT Inhalation Aerosol Solution; Patient Sig: Ventolin  (90 Base) MCG/ACT Inhalation Aerosol Solution ; 0; 03-Mar-2015; Active, Disp: , Rfl:   •  amLODIPine (NORVASC) 5 MG tablet, Take 5 mg by mouth Daily., Disp: ,  "Rfl:   •  dicyclomine (BENTYL) 10 MG capsule, Take 1 capsule by mouth 4 (Four) Times a Day Before Meals & at Bedtime., Disp: 90 capsule, Rfl: 2  •  meclizine (ANTIVERT) 25 MG tablet, Take 1 tablet by mouth 4 (Four) Times a Day As Needed for dizziness., Disp: 30 tablet, Rfl: 0  •  omeprazole (priLOSEC) 40 MG capsule, , Disp: , Rfl:   •  sertraline (ZOLOFT) 100 MG tablet, Take 100 mg by mouth Daily., Disp: , Rfl:   •  sucralfate (CARAFATE) 1 g tablet, Take 1 tablet by mouth 4 (Four) Times a Day., Disp: 90 tablet, Rfl: 3    ALLERGIES  Bactrim  [sulfamethoxazole-trimethoprim]; Hydrocodone; Lisinopril; Lyrica [pregabalin]; Meperidine; Morphine; Other; and Tramadol    VITALS  Vitals:    01/08/19 1327   Weight: 80 kg (176 lb 6.4 oz)   Height: 167.6 cm (65.98\")       LAST RESULTS   Admission on 12/28/2018, Discharged on 12/28/2018   Component Date Value Ref Range Status   • Case Report 12/28/2018    Final                    Value:Surgical Pathology Report                         Case: [id number]                                  Authorizing Provider:  Jazmyn Cullen MD         Collected:           12/28/2018 09:58 AM          Ordering Location:     [facility]   Received:            12/28/2018 10:31 AM                                 OR                                                                           Pathologist:           Emmanuel Ty MD                                                         Specimens:   1) - Small Intestine, Small Bowel                                                                   2) - Stomach, gaastric Biopsy                                                             • Final Diagnosis 12/28/2018    Final                    Value:This result contains rich text formatting which cannot be displayed here.   • Gross Description 12/28/2018    Final                    Value:This result contains rich text formatting which cannot be displayed here.   • Microscopic Description " 12/28/2018    Final                    Value:This result contains rich text formatting which cannot be displayed here.     No results found.    PHYSICAL EXAM  Physical Exam   Constitutional: She is oriented to person, place, and time. She appears well-developed and well-nourished. No distress.   HENT:   Head: Normocephalic and atraumatic.   Mouth/Throat: Oropharynx is clear and moist.   Eyes: EOM are normal. Pupils are equal, round, and reactive to light.   Neck: Normal range of motion. No tracheal deviation present.   Cardiovascular: Normal rate, regular rhythm, normal heart sounds and intact distal pulses. Exam reveals no gallop and no friction rub.   No murmur heard.  Pulmonary/Chest: Effort normal and breath sounds normal. No stridor. No respiratory distress. She has no wheezes. She has no rales. She exhibits no tenderness.   Abdominal: Soft. Bowel sounds are normal. She exhibits no distension. There is tenderness. There is no rebound and no guarding.   Diffuse mild tenderness throughout, no rebound or guarding   Musculoskeletal: She exhibits no edema.   Lymphadenopathy:     She has no cervical adenopathy.   Neurological: She is alert and oriented to person, place, and time.   Skin: Skin is warm. She is not diaphoretic.   Psychiatric: She has a normal mood and affect. Her behavior is normal. Judgment and thought content normal.   Nursing note and vitals reviewed.      ASSESSMENT  Diagnoses and all orders for this visit:    Rectal bleeding  -     Case Request; Standing  -     Case Request    Pain of upper abdomen  -     NM Gastric Emptying; Future    Gastroesophageal reflux disease, esophagitis presence not specified  -     NM Gastric Emptying; Future    Other irritable bowel syndrome    Family history of GI malignancy  -     Case Request; Standing  -     Case Request    Other orders  -     Follow Anesthesia Guidelines / Standing Orders; Future  -     Implement Anesthesia orders day of procedure.; Standing  -      Obtain informed consent; Standing          PLAN  No Follow-up on file.      Indications, risks and procedure were discussed with the patient, including but not limited to, bleeding, infection, possibility of perforation and possible polypectomy. All of the patient's questions were answered, and signed informed consent was obtained and placed on the chart.    Abdominal pain likely multifactorial- symptoms respond partially to bentyl. She does have endometriosis and scar tissue.

## 2019-01-10 ENCOUNTER — ANESTHESIA EVENT (OUTPATIENT)
Dept: PERIOP | Facility: HOSPITAL | Age: 39
End: 2019-01-10

## 2019-01-11 ENCOUNTER — HOSPITAL ENCOUNTER (OUTPATIENT)
Facility: HOSPITAL | Age: 39
Setting detail: HOSPITAL OUTPATIENT SURGERY
Discharge: HOME | End: 2019-01-11
Attending: INTERNAL MEDICINE | Admitting: INTERNAL MEDICINE

## 2019-01-11 ENCOUNTER — ANESTHESIA (OUTPATIENT)
Dept: PERIOP | Facility: HOSPITAL | Age: 39
End: 2019-01-11

## 2019-01-11 VITALS
TEMPERATURE: 98 F | SYSTOLIC BLOOD PRESSURE: 116 MMHG | HEIGHT: 65 IN | RESPIRATION RATE: 16 BRPM | OXYGEN SATURATION: 99 % | DIASTOLIC BLOOD PRESSURE: 87 MMHG | WEIGHT: 175.2 LBS | BODY MASS INDEX: 29.19 KG/M2 | HEART RATE: 77 BPM

## 2019-01-11 DIAGNOSIS — Z80.0 FAMILY HISTORY OF GI MALIGNANCY: ICD-10-CM

## 2019-01-11 DIAGNOSIS — K62.5 RECTAL BLEEDING: ICD-10-CM

## 2019-01-11 PROCEDURE — 45385 COLONOSCOPY W/LESION REMOVAL: CPT | Performed by: INTERNAL MEDICINE

## 2019-01-11 PROCEDURE — 45380 COLONOSCOPY AND BIOPSY: CPT | Performed by: INTERNAL MEDICINE

## 2019-01-11 PROCEDURE — 88305 TISSUE EXAM BY PATHOLOGIST: CPT | Performed by: INTERNAL MEDICINE

## 2019-01-11 PROCEDURE — 94640 AIRWAY INHALATION TREATMENT: CPT

## 2019-01-11 PROCEDURE — 25010000002 PROPOFOL 10 MG/ML EMULSION: Performed by: NURSE ANESTHETIST, CERTIFIED REGISTERED

## 2019-01-11 RX ORDER — GLYCOPYRROLATE 0.2 MG/ML
INJECTION INTRAMUSCULAR; INTRAVENOUS AS NEEDED
Status: DISCONTINUED | OUTPATIENT
Start: 2019-01-11 | End: 2019-01-11 | Stop reason: SURG

## 2019-01-11 RX ORDER — SODIUM CHLORIDE 0.9 % (FLUSH) 0.9 %
3 SYRINGE (ML) INJECTION EVERY 12 HOURS SCHEDULED
Status: DISCONTINUED | OUTPATIENT
Start: 2019-01-11 | End: 2019-01-11 | Stop reason: HOSPADM

## 2019-01-11 RX ORDER — SODIUM CHLORIDE, SODIUM LACTATE, POTASSIUM CHLORIDE, CALCIUM CHLORIDE 600; 310; 30; 20 MG/100ML; MG/100ML; MG/100ML; MG/100ML
9 INJECTION, SOLUTION INTRAVENOUS CONTINUOUS PRN
Status: DISCONTINUED | OUTPATIENT
Start: 2019-01-11 | End: 2019-01-11 | Stop reason: HOSPADM

## 2019-01-11 RX ORDER — PROPOFOL 10 MG/ML
VIAL (ML) INTRAVENOUS AS NEEDED
Status: DISCONTINUED | OUTPATIENT
Start: 2019-01-11 | End: 2019-01-11 | Stop reason: SURG

## 2019-01-11 RX ORDER — ALBUTEROL SULFATE 2.5 MG/3ML
2.5 SOLUTION RESPIRATORY (INHALATION) ONCE
Status: COMPLETED | OUTPATIENT
Start: 2019-01-11 | End: 2019-01-11

## 2019-01-11 RX ORDER — LIDOCAINE HYDROCHLORIDE 10 MG/ML
0.5 INJECTION, SOLUTION EPIDURAL; INFILTRATION; INTRACAUDAL; PERINEURAL ONCE AS NEEDED
Status: DISCONTINUED | OUTPATIENT
Start: 2019-01-11 | End: 2019-01-11 | Stop reason: HOSPADM

## 2019-01-11 RX ORDER — PROPOFOL 10 MG/ML
VIAL (ML) INTRAVENOUS CONTINUOUS PRN
Status: DISCONTINUED | OUTPATIENT
Start: 2019-01-11 | End: 2019-01-11 | Stop reason: SURG

## 2019-01-11 RX ORDER — SODIUM CHLORIDE 0.9 % (FLUSH) 0.9 %
1-10 SYRINGE (ML) INJECTION AS NEEDED
Status: DISCONTINUED | OUTPATIENT
Start: 2019-01-11 | End: 2019-01-11 | Stop reason: HOSPADM

## 2019-01-11 RX ORDER — SODIUM CHLORIDE 9 MG/ML
40 INJECTION, SOLUTION INTRAVENOUS AS NEEDED
Status: DISCONTINUED | OUTPATIENT
Start: 2019-01-11 | End: 2019-01-11 | Stop reason: HOSPADM

## 2019-01-11 RX ORDER — LIDOCAINE HYDROCHLORIDE 10 MG/ML
INJECTION, SOLUTION INFILTRATION; PERINEURAL AS NEEDED
Status: DISCONTINUED | OUTPATIENT
Start: 2019-01-11 | End: 2019-01-11 | Stop reason: SURG

## 2019-01-11 RX ADMIN — PROPOFOL 100 MCG/KG/MIN: 10 INJECTION, EMULSION INTRAVENOUS at 13:52

## 2019-01-11 RX ADMIN — PROPOFOL 50 MG: 10 INJECTION, EMULSION INTRAVENOUS at 14:21

## 2019-01-11 RX ADMIN — SODIUM CHLORIDE, POTASSIUM CHLORIDE, SODIUM LACTATE AND CALCIUM CHLORIDE: 600; 310; 30; 20 INJECTION, SOLUTION INTRAVENOUS at 13:49

## 2019-01-11 RX ADMIN — ALBUTEROL SULFATE 2.5 MG: 2.5 SOLUTION RESPIRATORY (INHALATION) at 13:39

## 2019-01-11 RX ADMIN — PROPOFOL 50 MG: 10 INJECTION, EMULSION INTRAVENOUS at 14:33

## 2019-01-11 RX ADMIN — PROPOFOL 50 MG: 10 INJECTION, EMULSION INTRAVENOUS at 14:11

## 2019-01-11 RX ADMIN — PROPOFOL 50 MG: 10 INJECTION, EMULSION INTRAVENOUS at 14:27

## 2019-01-11 RX ADMIN — GLYCOPYRROLATE 0.1 MG: 0.2 INJECTION INTRAMUSCULAR; INTRAVENOUS at 13:51

## 2019-01-11 RX ADMIN — PROPOFOL 50 MG: 10 INJECTION, EMULSION INTRAVENOUS at 13:56

## 2019-01-11 RX ADMIN — PROPOFOL 50 MG: 10 INJECTION, EMULSION INTRAVENOUS at 13:52

## 2019-01-11 RX ADMIN — PROPOFOL 50 MG: 10 INJECTION, EMULSION INTRAVENOUS at 14:16

## 2019-01-11 RX ADMIN — LIDOCAINE HYDROCHLORIDE 50 MG: 10 INJECTION, SOLUTION INFILTRATION; PERINEURAL at 13:51

## 2019-01-11 RX ADMIN — PROPOFOL 50 MG: 10 INJECTION, EMULSION INTRAVENOUS at 14:01

## 2019-01-11 RX ADMIN — PROPOFOL 50 MG: 10 INJECTION, EMULSION INTRAVENOUS at 14:07

## 2019-01-11 RX ADMIN — PROPOFOL 50 MG: 10 INJECTION, EMULSION INTRAVENOUS at 13:54

## 2019-01-11 NOTE — ANESTHESIA POSTPROCEDURE EVALUATION
Patient: Emmy Dietz    Procedure Summary     Date:  01/11/19 Room / Location:  [facility] ENDOSCOPY 2 /  [facility] OR    Anesthesia Start:  1349 Anesthesia Stop:  1439    Procedure:  COLONOSCOPY (N/A ) Diagnosis:       Rectal bleeding      Family history of GI malignancy      (Rectal bleeding [K62.5])      (Family history of GI malignancy [Z80.0])    Surgeon:  Jazmyn Cullen MD Provider:  Milton Solano CRNA    Anesthesia Type:  MAC ASA Status:  2          Anesthesia Type: MAC  Last vitals  BP   107/70 (01/11/19 1500)   Temp   98 °F (36.7 °C) (01/11/19 1443)   Pulse   63 (01/11/19 1500)   Resp   14 (01/11/19 1500)     SpO2   99 % (01/11/19 1500)     Post Anesthesia Care and Evaluation    Patient location during evaluation: bedside  Patient participation: complete - patient participated  Level of consciousness: awake  Pain score: 0  Pain management: adequate  Airway patency: patent  Anesthetic complications: No anesthetic complications  PONV Status: none  Cardiovascular status: acceptable  Respiratory status: acceptable  Hydration status: acceptable

## 2019-01-11 NOTE — ANESTHESIA PREPROCEDURE EVALUATION
Anesthesia Evaluation     Patient summary reviewed and Nursing notes reviewed   history of anesthetic complications (difficult intubation due to narcotic reaction and throat swelling, per pt): difficult airway prolonged sedation  NPO Solid Status: > 8 hours  NPO Liquid Status: > 8 hours           Airway   Mallampati: II  TM distance: >3 FB  Neck ROM: full  No difficulty expected  Dental - normal exam     Pulmonary - normal exam    breath sounds clear to auscultation  (+) a smoker (1/2 ppd x 20 years) Current Smoked day of surgery, asthma,   Cardiovascular - normal exam  Exercise tolerance: good (4-7 METS)    Rhythm: regular  Rate: normal    (+) hypertension less than 2 medications, hyperlipidemia,       Neuro/Psych  (+) headaches, dizziness/light headedness (vertigo), numbness (R leg pain), psychiatric history Anxiety and Depression,     GI/Hepatic/Renal/Endo    (+)  hiatal hernia, GERD well controlled, GI bleeding, liver disease (cyst),     Musculoskeletal     (+) back pain, chronic pain,   Abdominal  - normal exam   Substance History - negative use     OB/GYN          Other      history of cancer (Melanoma) active                    Anesthesia Plan    ASA 2     Anesthetic plan, all risks, benefits, and alternatives have been provided, discussed and informed consent has been obtained with: patient.  Use of blood products discussed with patient  Consented to blood products.

## 2019-01-11 NOTE — H&P
PATIENT INFORMATION  Emmy Dietz         - 1980     CHIEF COMPLAINT      Chief Complaint   Patient presents with   • Abdominal Pain   • Rectal Bleeding   • Heartburn         HISTORY OF PRESENT ILLNESS  HPI     She is here today in follow up after EGD. Pathology is reviewed wth her today. Gastritis but no h.pylori. Abdominal pain is intermittent and occurs twice weekly. No known aggravating or alleviating factors  She is on dicyclomine which does helps.  She drinks coke and sprite 2-3 daily.  Rectal bleeding is intermittent.  Last cls was in , hyperplastic polyps removed. Mom with colon polyps removed. Her brother has polyps.   Weight has been stable.  She is scheduled to see Dr. Larsen who is following her for her endometriosis.  REVIEW OF SYSTEMS  Review of Systems   Eyes: Positive for photophobia.   Cardiovascular: Positive for leg swelling.   Gastrointestinal: Positive for abdominal pain, anal bleeding and blood in stool.        Reflux   Endocrine: Positive for polydipsia.   Musculoskeletal: Positive for back pain and myalgias.   Psychiatric/Behavioral: Positive for sleep disturbance.   All other systems reviewed and are negative.           ACTIVE PROBLEMS      Patient Active Problem List     Diagnosis   • Gastroesophageal reflux disease [K21.9]   • Pain of upper abdomen [R10.10]   • Leukocytosis [D72.829]            PAST MEDICAL HISTORY  Medical History        Past Medical History:   Diagnosis Date   • Anxiety     • Asthma     • Colon polyp     • Depression     • Endometriosis     • GERD (gastroesophageal reflux disease)     • Hard to intubate     • Hiatal hernia     • Hiatal hernia     • Hyperlipidemia     • Hypertension     • Injury of back     • Leg pain     • Leukocytosis, likely related to tobacco use     • Liver cyst     • Melanoma (CMS/HCC)       Superficial spreading lentiginous, right lower extremity   • Migraines     • PONV (postoperative nausea and vomiting)                  SURGICAL HISTORY  Surgical History         Past Surgical History:   Procedure Laterality Date   • APPENDECTOMY       •  SECTION       • CHOLECYSTECTOMY       • COLONOSCOPY       • ENDOSCOPY N/A 2018     Procedure: ESOPHAGOGASTRODUODENOSCOPY WITH BIOPSIES;  Surgeon: Jazmyn Cullen MD;  Location: [facility];  Service: Gastroenterology   • HEMORRHOIDECTOMY       • HYSTEROSCOPY ENDOMETRIAL ABLATION       • OVARIAN CYST REMOVAL Left       Benign tumor   • OVARY SURGERY Left    • PELVIC LAPAROSCOPY       • SKIN BIOPSY       • SKIN CANCER EXCISION Right       Leg melanoma   • TUBAL ABDOMINAL LIGATION                   FAMILY HISTORY        Family History   Problem Relation Age of Onset   • Cervical cancer Mother 26   • Skin cancer Mother 54         Basal cell   • Diabetes Mother     • Colon polyps Mother     • Skin cancer Father 53         Non-melanoma   • Heart disease Father     • Hypertension Father     • Other Daughter           Enlarged spleen   • Brain cancer Other 60   • Pancreatic cancer Other     • Diabetes Other     • Heart disease Other     • Hypertension Other     • Colon cancer Maternal Uncle              SOCIAL HISTORY  Social History            Occupational History       Employer: UNEMPLOYED   Tobacco Use   • Smoking status: Current Every Day Smoker       Packs/day: 0.50       Years: 10.00       Pack years: 5.00       Types: Cigarettes   Substance and Sexual Activity   • Alcohol use: Yes       Comment: occassionaly   • Drug use: No   • Sexual activity: Defer         Debilities/Disabilities Identified: None     Emotional Behavior: Appropriate     CURRENT MEDICATIONS     Current Outpatient Medications:   •  albuterol (PROVENTIL) (2.5 MG/3ML) 0.083% nebulizer solution, Take 2.5 mg by nebulization Every 4 (Four) Hours As Needed for Wheezing., Disp: , Rfl:   •  albuterol (VENTOLIN HFA) 108 (90 BASE) MCG/ACT inhaler, Ventolin  (90 Base) MCG/ACT Inhalation Aerosol Solution;  "Patient Sig: Ventolin  (90 Base) MCG/ACT Inhalation Aerosol Solution ; 0; 03-Mar-2015; Active, Disp: , Rfl:   •  amLODIPine (NORVASC) 5 MG tablet, Take 5 mg by mouth Daily., Disp: , Rfl:   •  dicyclomine (BENTYL) 10 MG capsule, Take 1 capsule by mouth 4 (Four) Times a Day Before Meals & at Bedtime., Disp: 90 capsule, Rfl: 2  •  meclizine (ANTIVERT) 25 MG tablet, Take 1 tablet by mouth 4 (Four) Times a Day As Needed for dizziness., Disp: 30 tablet, Rfl: 0  •  omeprazole (priLOSEC) 40 MG capsule, , Disp: , Rfl:   •  sertraline (ZOLOFT) 100 MG tablet, Take 100 mg by mouth Daily., Disp: , Rfl:   •  sucralfate (CARAFATE) 1 g tablet, Take 1 tablet by mouth 4 (Four) Times a Day., Disp: 90 tablet, Rfl: 3     ALLERGIES  Bactrim  [sulfamethoxazole-trimethoprim]; Hydrocodone; Lisinopril; Lyrica [pregabalin]; Meperidine; Morphine; Other; and Tramadol     VITALS  /90 (BP Location: Left arm, Patient Position: Lying)   Pulse 72   Temp 98.2 °F (36.8 °C) (Oral)   Resp 15   Ht 165.1 cm (65\")   Wt 79.5 kg (175 lb 3.2 oz)   SpO2 96%   BMI 29.15 kg/m²               LAST RESULTS                    Admission on 12/28/2018, Discharged on 12/28/2018   Component Date Value Ref Range Status    • Case Report 12/28/2018     Final                    Value:Surgical Pathology Report                         Case: [id number]                                  Authorizing Provider:  Jazmyn Cullen MD         Collected:           12/28/2018 09:58 AM          Ordering Location:     [facility]   Received:            12/28/2018 10:31 AM                                 OR                                                                           Pathologist:           Emmanuel Ty MD                                                         Specimens:   1) - Small Intestine, Small Bowel                                                                   2) - Stomach, gaastric Biopsy                                          "                     • Final Diagnosis 12/28/2018     Final                    Value:This result contains rich text formatting which cannot be displayed here.   • Gross Description 12/28/2018     Final                    Value:This result contains rich text formatting which cannot be displayed here.   • Microscopic Description 12/28/2018     Final                    Value:This result contains rich text formatting which cannot be displayed here.      No results found.     PHYSICAL EXAM  Physical Exam   Constitutional: She is oriented to person, place, and time. She appears well-developed and well-nourished. No distress.   HENT:   Head: Normocephalic and atraumatic.   Mouth/Throat: Oropharynx is clear and moist.   Eyes: EOM are normal. Pupils are equal, round, and reactive to light.   Neck: Normal range of motion. No tracheal deviation present.   Cardiovascular: Normal rate, regular rhythm, normal heart sounds and intact distal pulses. Exam reveals no gallop and no friction rub.   No murmur heard.  Pulmonary/Chest: Effort normal and breath sounds normal. No stridor. No respiratory distress. She has no wheezes. She has no rales. She exhibits no tenderness.   Abdominal: Soft. Bowel sounds are normal. She exhibits no distension. There is tenderness. There is no rebound and no guarding.   Diffuse mild tenderness throughout, no rebound or guarding   Musculoskeletal: She exhibits no edema.   Lymphadenopathy:     She has no cervical adenopathy.   Neurological: She is alert and oriented to person, place, and time.   Skin: Skin is warm. She is not diaphoretic.   Psychiatric: She has a normal mood and affect. Her behavior is normal. Judgment and thought content normal.   Nursing note and vitals reviewed.        ASSESSMENT  Diagnoses and all orders for this visit:     Rectal bleeding  -     Case Request; Standing  -     Case Request     Pain of upper abdomen  -     NM Gastric Emptying; Future     Gastroesophageal reflux disease,  esophagitis presence not specified  -     NM Gastric Emptying; Future     Other irritable bowel syndrome     Family history of GI malignancy  -     Case Request; Standing  -     Case Request     Other orders  -     Follow Anesthesia Guidelines / Standing Orders; Future  -     Implement Anesthesia orders day of procedure.; Standing  -     Obtain informed consent; Standing              PLAN  No Follow-up on file.        Indications, risks and procedure were discussed with the patient, including but not limited to, bleeding, infection, possibility of perforation and possible polypectomy. All of the patient's questions were answered, and signed informed consent was obtained and placed on the chart.     Abdominal pain likely multifactorial- symptoms respond partially to bentyl. She does have endometriosis and scar tissue.

## 2019-01-11 NOTE — OP NOTE
Patient Name:  Emmy Dietz  YOB: 1980    Date of Procedure:  1/11/2019    Procedure Performed: Colonoscopoy   Indications:  Rectal bleeding, family history of colon cancer    Pre-op Diagnosis:   Rectal bleeding [K62.5]  Family history of GI malignancy [Z80.0]    Post-Op Diagnosis Codes:     * Rectal bleeding [K62.5]     * Family history of GI malignancy [Z80.0]         Staff:  Surgeon(s):  Jazmyn Cullen MD         Consent: Procedure Colonoscopy Indications, risks and procedure were discussed with the patient, including but not limited to, bleeding, infection, possibility of perforation and possible polypectomy. All of the patient's questions were answered, and signed informed consent was obtained and placed on the chart.      Sedation: Sedation was provided by anesthesia.      Estimated Blood Loss: minimal    Specimens:   ID Type Source Tests Collected by Time   A : Transverse colon polyp Polyp Large Intestine, Transverse Colon TISSUE PATHOLOGY EXAM Jazmyn Cullen MD 1/11/2019 1424   B : Sigmoid colon polyp Polyp Large Intestine, Sigmoid Colon TISSUE PATHOLOGY EXAM Jazmyn Cullen MD 1/11/2019 1430   C : Rectal polyps x4 Polyp Large Intestine, Rectum TISSUE PATHOLOGY EXAM Jazmyn Cullen MD 1/11/2019 1436         Description of Procedure:   After excellent sedation a digital rectal examination is performed and a flexible colonoscope was inserted into the rectum passed to the cecum.  The cecum was identified by both the ileocecal valve and the appendiceal orifice.  The overall bowel preparation was fair .  Upon withdrawal scope careful examination mucosa was made.  Pertinent findings include a 3 mm transverse polyp removed completely using forceps.  A 7-8 mm sessile sigmoid polyp removed using snare cautery and a total of 4 rectal polyps are about 2-3 mm sessile removed with forceps.  The scope was slowly withdrawn to the rectum and retroflex were internal hemorrhoids are noted.  There is  healed scar is noted in the rectal area consistent with her previous hemorrhoid surgery. The scope was straightened and withdrawn out of the patient with no immediate, patient's and she tolerated the procedure well.      Findings:   Colon polyps removed using snare cautery and forceps  Internal hemorrhoids   We'll await biopsy results and a repeat colonoscopy will likely be recommended between 3 and 5 years based on pathology results.    Complications: None    Jazmyn Cullen MD     Date: 1/11/2019  Time: 2:42 PM

## 2019-01-23 ENCOUNTER — HOSPITAL ENCOUNTER (OUTPATIENT)
Dept: NUCLEAR MEDICINE | Facility: HOSPITAL | Age: 39
Discharge: HOME | End: 2019-01-23
Attending: INTERNAL MEDICINE

## 2019-01-23 DIAGNOSIS — R10.10 PAIN OF UPPER ABDOMEN: ICD-10-CM

## 2019-01-23 DIAGNOSIS — K21.9 GASTROESOPHAGEAL REFLUX DISEASE, ESOPHAGITIS PRESENCE NOT SPECIFIED: ICD-10-CM

## 2019-01-23 PROCEDURE — 0 TECHNETIUM SULFUR COLLOID: Performed by: INTERNAL MEDICINE

## 2019-01-23 PROCEDURE — A9541 TC99M SULFUR COLLOID: HCPCS | Performed by: INTERNAL MEDICINE

## 2019-01-23 PROCEDURE — 78264 GASTRIC EMPTYING IMG STUDY: CPT

## 2019-01-23 RX ADMIN — TECHNETIUM TC 99M SULFUR COLLOID 1 DOSE: KIT at 10:40

## 2019-01-24 ENCOUNTER — OFFICE VISIT (OUTPATIENT)
Dept: GASTROENTEROLOGY | Facility: CLINIC | Age: 39
End: 2019-01-24

## 2019-01-24 VITALS — BODY MASS INDEX: 30.22 KG/M2 | HEIGHT: 65 IN | WEIGHT: 181.4 LBS

## 2019-01-24 DIAGNOSIS — R14.0 ABDOMINAL BLOATING: ICD-10-CM

## 2019-01-24 DIAGNOSIS — K21.9 GASTROESOPHAGEAL REFLUX DISEASE, ESOPHAGITIS PRESENCE NOT SPECIFIED: Primary | ICD-10-CM

## 2019-01-24 PROCEDURE — 99213 OFFICE O/P EST LOW 20 MIN: CPT | Performed by: INTERNAL MEDICINE

## 2019-01-24 NOTE — PROGRESS NOTES
PATIENT INFORMATION  Emmy Dietz       - 1980    CHIEF COMPLAINT  Chief Complaint   Patient presents with   • Abdominal Pain     follow up on c/s & GES   • Heartburn       HISTORY OF PRESENT ILLNESS  HPI    She is here in follow up from her cls. Pathology reviewed with her. Polyps were hyperplastic  She did see her obgyn and considering hysterectomy. She has ?mass in ovary??.    She is having issues with bloating and gas. She is currently not on a probiotic    REVIEW OF SYSTEMS  Review of Systems   Respiratory: Positive for cough.    Cardiovascular: Positive for leg swelling.   Gastrointestinal: Positive for abdominal distention.        Reflux   Endocrine: Positive for polydipsia.   Genitourinary: Positive for dyspareunia and pelvic pain.   Musculoskeletal: Positive for arthralgias, back pain and myalgias.   All other systems reviewed and are negative.        ACTIVE PROBLEMS  Patient Active Problem List    Diagnosis   • Rectal bleeding [K62.5]   • Family history of GI malignancy [Z80.0]   • Gastroesophageal reflux disease [K21.9]   • Pain of upper abdomen [R10.10]   • Leukocytosis [D72.829]         PAST MEDICAL HISTORY  Past Medical History:   Diagnosis Date   • Anxiety    • Asthma    • Colon polyp    • Depression    • Endometriosis    • GERD (gastroesophageal reflux disease)    • Hard to intubate    • Hiatal hernia    • Hiatal hernia    • Hyperlipidemia    • Hypertension    • Injury of back    • Leg pain    • Leukocytosis, likely related to tobacco use    • Liver cyst    • Melanoma (CMS/HCC)     Superficial spreading lentiginous, right lower extremity   • Migraines    • PONV (postoperative nausea and vomiting)          SURGICAL HISTORY  Past Surgical History:   Procedure Laterality Date   • APPENDECTOMY     •  SECTION     • CHOLECYSTECTOMY     • COLONOSCOPY     • COLONOSCOPY N/A 2019    Procedure: COLONOSCOPY;  Surgeon: Jazmyn Cullen MD;  Location: [facility];  Service:  Gastroenterology   • ENDOSCOPY N/A 12/28/2018    Procedure: ESOPHAGOGASTRODUODENOSCOPY WITH BIOPSIES;  Surgeon: Jazmyn Cullen MD;  Location: [facility];  Service: Gastroenterology   • HEMORRHOIDECTOMY     • HYSTEROSCOPY ENDOMETRIAL ABLATION     • OVARIAN CYST REMOVAL Left     Benign tumor   • OVARY SURGERY Left 2000   • PELVIC LAPAROSCOPY     • SKIN BIOPSY     • SKIN CANCER EXCISION Right     Leg melanoma   • TUBAL ABDOMINAL LIGATION           FAMILY HISTORY  Family History   Problem Relation Age of Onset   • Cervical cancer Mother 26   • Skin cancer Mother 54        Basal cell   • Diabetes Mother    • Colon polyps Mother    • Skin cancer Father 53        Non-melanoma   • Heart disease Father    • Hypertension Father    • Other Daughter         Enlarged spleen   • Brain cancer Other 60   • Pancreatic cancer Other    • Diabetes Other    • Heart disease Other    • Hypertension Other    • Colon cancer Maternal Uncle    • Breast cancer Maternal Grandmother          SOCIAL HISTORY  Social History     Occupational History     Employer: UNEMPLOYED   Tobacco Use   • Smoking status: Current Every Day Smoker     Packs/day: 0.50     Years: 10.00     Pack years: 5.00     Types: Cigarettes   • Smokeless tobacco: Never Used   Substance and Sexual Activity   • Alcohol use: Yes     Comment: occassionaly   • Drug use: No   • Sexual activity: Defer       Debilities/Disabilities Identified: None    Emotional Behavior: Appropriate    CURRENT MEDICATIONS    Current Outpatient Medications:   •  albuterol (PROVENTIL) (2.5 MG/3ML) 0.083% nebulizer solution, Take 2.5 mg by nebulization Every 4 (Four) Hours As Needed for Wheezing., Disp: , Rfl:   •  albuterol (VENTOLIN HFA) 108 (90 BASE) MCG/ACT inhaler, Ventolin  (90 Base) MCG/ACT Inhalation Aerosol Solution; Patient Sig: Ventolin  (90 Base) MCG/ACT Inhalation Aerosol Solution ; 0; 03-Mar-2015; Active, Disp: , Rfl:   •  amLODIPine (NORVASC) 5 MG tablet, Take 5 mg by mouth  "Daily., Disp: , Rfl:   •  dicyclomine (BENTYL) 10 MG capsule, Take 1 capsule by mouth 4 (Four) Times a Day Before Meals & at Bedtime., Disp: 90 capsule, Rfl: 2  •  meclizine (ANTIVERT) 25 MG tablet, Take 1 tablet by mouth 4 (Four) Times a Day As Needed for dizziness., Disp: 30 tablet, Rfl: 0  •  omeprazole (priLOSEC) 40 MG capsule, , Disp: , Rfl:   •  sertraline (ZOLOFT) 100 MG tablet, Take 100 mg by mouth Daily., Disp: , Rfl:   •  sucralfate (CARAFATE) 1 g tablet, Take 1 tablet by mouth 4 (Four) Times a Day., Disp: 90 tablet, Rfl: 3    ALLERGIES  Bactrim  [sulfamethoxazole-trimethoprim]; Erythromycin; Hydrocodone; Lisinopril; Lyrica [pregabalin]; Meperidine; Morphine; Niacin; Other; and Tramadol    VITALS  Vitals:    01/24/19 1155   Weight: 82.3 kg (181 lb 6.4 oz)   Height: 165.1 cm (65\")       LAST RESULTS   Admission on 01/11/2019, Discharged on 01/11/2019   Component Date Value Ref Range Status   • Case Report 01/11/2019    Final                    Value:Surgical Pathology Report                         Case: [id number]                                  Authorizing Provider:  Jazmyn Cullen MD         Collected:           01/11/2019 02:24 PM          Ordering Location:     [facility]   Received:            01/11/2019 03:09 PM                                 OR                                                                           Pathologist:           Lisa Shook MD                                                    Specimens:   1) - Large Intestine, Transverse Colon, Transverse colon polyp                                      2) - Large Intestine, Sigmoid Colon, Sigmoid colon polyp                                            3) - Large Intestine, Rectum, Rectal polyps x4                                            • Final Diagnosis 01/11/2019    Final                    Value:This result contains rich text formatting which cannot be displayed here.   • Gross Description 01/11/2019    " Final                    Value:This result contains rich text formatting which cannot be displayed here.   • Microscopic Description 01/11/2019    Final                    Value:This result contains rich text formatting which cannot be displayed here.     Nm Gastric Emptying    Result Date: 1/23/2019  Narrative: GASTRIC EMPTYING SCAN 1/23/2019  HISTORY: Nausea, vomiting and abdominal pain after meals for 3 years, worsening in the past 3 months.  DOSE: 596 uCi technetium 99m tagged sulfur colloid in eggs and 8 ounces of water.  COMPARISON: None  FINDINGS: Images of the upper abdomen were obtained for 60 minutes post ingestion of the radiopharmaceutical. Gastric half emptying time was calculated to be 63 minutes (normal is between 65 and 90 minutes).      Impression: Borderline rapid gastric half-emptying time of 63 minutes.  This report was finalized on 1/23/2019 12:40 PM by Dr. Arturo Rutherford MD.        PHYSICAL EXAM  Physical Exam   Constitutional: She is oriented to person, place, and time. She appears well-developed and well-nourished. No distress.   HENT:   Head: Normocephalic and atraumatic.   Mouth/Throat: Oropharynx is clear and moist.   Eyes: EOM are normal. Pupils are equal, round, and reactive to light.   Neck: Normal range of motion. No tracheal deviation present.   Cardiovascular: Normal rate, regular rhythm, normal heart sounds and intact distal pulses. Exam reveals no gallop and no friction rub.   No murmur heard.  Pulmonary/Chest: Effort normal and breath sounds normal. No stridor. No respiratory distress. She has no wheezes. She has no rales. She exhibits no tenderness.   Abdominal: Soft. Bowel sounds are normal. She exhibits no distension. There is tenderness. There is no rebound and no guarding.   Mild diffuse pain, unchanged. No rebound or guarding.   Musculoskeletal: She exhibits no edema.   Lymphadenopathy:     She has no cervical adenopathy.   Neurological: She is alert and oriented to person,  place, and time.   Skin: Skin is warm. She is not diaphoretic.   Psychiatric: She has a normal mood and affect. Her behavior is normal. Judgment and thought content normal.   Nursing note and vitals reviewed.      ASSESSMENT  Diagnoses and all orders for this visit:    Gastroesophageal reflux disease, esophagitis presence not specified  -     Breath Hydrogen Test; Future    Abdominal bloating  -     Breath Hydrogen Test; Future          PLAN  No Follow-up on file.        Align once daily    sibo study      rtc 3 months

## 2019-01-30 ENCOUNTER — OFFICE VISIT (OUTPATIENT)
Dept: SURGERY | Facility: CLINIC | Age: 39
End: 2019-01-30

## 2019-01-30 VITALS
HEIGHT: 65 IN | BODY MASS INDEX: 30.66 KG/M2 | WEIGHT: 184 LBS | HEART RATE: 72 BPM | SYSTOLIC BLOOD PRESSURE: 122 MMHG | RESPIRATION RATE: 16 BRPM | DIASTOLIC BLOOD PRESSURE: 82 MMHG

## 2019-01-30 DIAGNOSIS — N64.52 BILATERAL NIPPLE DISCHARGE: Primary | ICD-10-CM

## 2019-01-30 DIAGNOSIS — N63.20 LEFT BREAST MASS: ICD-10-CM

## 2019-01-30 PROCEDURE — 99203 OFFICE O/P NEW LOW 30 MIN: CPT | Performed by: SURGERY

## 2019-01-30 NOTE — PROGRESS NOTES
Emmy Dietz 38 y.o. female presents @ the req of SHANELLE Olmos for eval of mass LEFT breast. LAST MAMMO APPROX 2 MOS AGO @ [facility].   Pt reports LEFT breast is very tender to the touch.  States she has light green drainage from both nipples X 16 yrs off and on - since breastfeeding.  Pt reports she is currently under Dr. Larsen's care and has an appointment next week to sched hysterectomy for mass on RIGHT ovary. Reports recent EGD and C-SCOPE with multiple polyps removed.   Chief Complaint   Patient presents with   • Mass     LEFT breast             HPI   Above noted and agree.  Emmy has a complicated history.  She has a history of melanoma.  She also has been having bilateral nipple discharge for 16 years.  She is scheduled to have a hysterectomy.  She has two children.  Her first pregnancy was at age 20.  She breast fed one of her children.  She smokes.  Her maternal grandmother has a history of breast cancer.  She has had left breast tenderness for over a year.  She had a mammogram a year ago and was referred to a breast specialist.  She failed to keep the appointment so she was not allowed to reschedule.  She has had more breast tenderness and so she had a mammogram and ultrasound.  The ultrasound reported benign findings.        Review of Systems   All other systems reviewed and are negative.            Current Outpatient Medications:   •  albuterol (PROVENTIL) (2.5 MG/3ML) 0.083% nebulizer solution, Take 2.5 mg by nebulization Every 4 (Four) Hours As Needed for Wheezing., Disp: , Rfl:   •  albuterol (VENTOLIN HFA) 108 (90 BASE) MCG/ACT inhaler, Ventolin  (90 Base) MCG/ACT Inhalation Aerosol Solution; Patient Sig: Ventolin  (90 Base) MCG/ACT Inhalation Aerosol Solution ; 0; 03-Mar-2015; Active, Disp: , Rfl:   •  amLODIPine (NORVASC) 5 MG tablet, Take 5 mg by mouth Daily., Disp: , Rfl:   •  dicyclomine (BENTYL) 10 MG capsule, Take 1 capsule by mouth 4 (Four) Times a Day Before Meals & at  Bedtime., Disp: 90 capsule, Rfl: 2  •  meclizine (ANTIVERT) 25 MG tablet, Take 1 tablet by mouth 4 (Four) Times a Day As Needed for dizziness., Disp: 30 tablet, Rfl: 0  •  omeprazole (priLOSEC) 40 MG capsule, , Disp: , Rfl:   •  sertraline (ZOLOFT) 100 MG tablet, Take 100 mg by mouth Daily., Disp: , Rfl:   •  sucralfate (CARAFATE) 1 g tablet, Take 1 tablet by mouth 4 (Four) Times a Day., Disp: 90 tablet, Rfl: 3        Allergies   Allergen Reactions   • Bactrim  [Sulfamethoxazole-Trimethoprim]    • Erythromycin Unknown (See Comments)   • Hydrocodone    • Lisinopril    • Lyrica [Pregabalin]    • Meperidine Hives   • Morphine    • Niacin Hives   • Other Swelling     Erythromycin ophthalmic   • Tramadol Unknown (See Comments)     UNKNOWN           Past Medical History:   Diagnosis Date   • Anxiety    • Asthma    • Colon polyp    • Depression    • Endometriosis    • GERD (gastroesophageal reflux disease)    • Hard to intubate    • Hiatal hernia    • Hiatal hernia    • Hyperlipidemia    • Hypertension    • Injury of back    • Leg pain    • Leukocytosis, likely related to tobacco use    • Liver cyst    • Melanoma (CMS/HCC)     Superficial spreading lentiginous, right lower extremity   • Migraines    • PONV (postoperative nausea and vomiting)            Past Surgical History:   Procedure Laterality Date   • APPENDECTOMY     •  SECTION     • CHOLECYSTECTOMY     • COLONOSCOPY     • COLONOSCOPY N/A 2019    Procedure: COLONOSCOPY;  Surgeon: Jazmyn Cullen MD;  Location: [facility];  Service: Gastroenterology   • ENDOSCOPY N/A 2018    Procedure: ESOPHAGOGASTRODUODENOSCOPY WITH BIOPSIES;  Surgeon: Jazmyn Cullen MD;  Location: [facility] OR;  Service: Gastroenterology   • HEMORRHOIDECTOMY     • HYSTEROSCOPY ENDOMETRIAL ABLATION     • OVARIAN CYST REMOVAL Left     Benign tumor   • OVARY SURGERY Left    • PELVIC LAPAROSCOPY     • SKIN BIOPSY     • SKIN CANCER EXCISION Right     Leg melanoma   • TUBAL ABDOMINAL  "LIGATION             Social History     Tobacco Use   • Smoking status: Current Every Day Smoker     Packs/day: 0.50     Years: 10.00     Pack years: 5.00     Types: Cigarettes   • Smokeless tobacco: Never Used   Substance Use Topics   • Alcohol use: Yes     Comment: occassionaly   • Drug use: No             There is no immunization history on file for this patient.        Physical Exam   Constitutional: She is oriented to person, place, and time. She appears well-developed and well-nourished.   HENT:   Head: Normocephalic and atraumatic.   Right Ear: External ear normal.   Left Ear: External ear normal.   Cardiovascular: Normal rate and regular rhythm.   Pulmonary/Chest: Effort normal and breath sounds normal. Right breast exhibits no inverted nipple, no mass, no nipple discharge, no skin change and no tenderness. Left breast exhibits no inverted nipple, no nipple discharge, no skin change and no tenderness.       Abdominal: Soft. Bowel sounds are normal.   Musculoskeletal: She exhibits no edema or deformity.   Neurological: She is alert and oriented to person, place, and time.   Skin: Skin is warm and dry.   Psychiatric: She has a normal mood and affect. Her behavior is normal.   Nursing note and vitals reviewed.      Debilities/Disabilities Identified: None    Emotional Behavior: Appropriate      /82   Pulse 72   Resp 16   Ht 165.1 cm (65\")   Wt 83.5 kg (184 lb)   BMI 30.62 kg/m²         Emmy was seen today for mass.    Diagnoses and all orders for this visit:    Bilateral nipple discharge    Left breast mass    At this point I am going to have Emmy bring me the films so I can review them.  We are also going to get an MRI of the brain to evaluate the bilateral nipple discharge.  I also discussed with her tobacco cessation and caffeine cessation.    Thank you for allowing me to participate in the care of this interesting patient.        "

## 2019-02-05 DIAGNOSIS — N64.52 NIPPLE DISCHARGE IN FEMALE: Primary | ICD-10-CM

## 2019-02-12 ENCOUNTER — TRANSCRIBE ORDERS (OUTPATIENT)
Dept: ADMINISTRATIVE | Facility: HOSPITAL | Age: 39
End: 2019-02-12

## 2019-02-12 ENCOUNTER — HOSPITAL ENCOUNTER (OUTPATIENT)
Dept: MRI IMAGING | Facility: HOSPITAL | Age: 39
Discharge: HOME | End: 2019-02-12
Admitting: SURGERY

## 2019-02-12 DIAGNOSIS — N64.52 NIPPLE DISCHARGE: Primary | ICD-10-CM

## 2019-02-12 DIAGNOSIS — N64.52 NIPPLE DISCHARGE IN FEMALE: ICD-10-CM

## 2019-02-12 PROCEDURE — A9577 INJ MULTIHANCE: HCPCS | Performed by: SURGERY

## 2019-02-12 PROCEDURE — 70553 MRI BRAIN STEM W/O & W/DYE: CPT

## 2019-02-12 PROCEDURE — 0 GADOBENATE DIMEGLUMINE 529 MG/ML SOLUTION: Performed by: SURGERY

## 2019-02-12 RX ADMIN — GADOBENATE DIMEGLUMINE 17 ML: 529 INJECTION, SOLUTION INTRAVENOUS at 10:42

## 2019-02-13 ENCOUNTER — OFFICE VISIT (OUTPATIENT)
Dept: SURGERY | Facility: CLINIC | Age: 39
End: 2019-02-13

## 2019-02-13 DIAGNOSIS — N63.0 BREAST MASS: ICD-10-CM

## 2019-02-13 DIAGNOSIS — N64.52 NIPPLE DISCHARGE: Primary | ICD-10-CM

## 2019-02-13 PROCEDURE — 99213 OFFICE O/P EST LOW 20 MIN: CPT | Performed by: SURGERY

## 2019-02-13 NOTE — PROGRESS NOTES
Emmy Dietz 38 y.o. female presents for  FU/DISCUSS BRAIN MRI.         HPI   Emmy is here today to discuss her MRI.  She brought a CD of her [email or web address] films, too.  Her MRI was not complete because she needs further images but so far it is negative.  She is still smoking.        Review of Systems        Past Medical History:   Diagnosis Date   • Anxiety    • Asthma    • Colon polyp    • Depression    • Endometriosis    • GERD (gastroesophageal reflux disease)    • Hard to intubate    • Hiatal hernia    • Hiatal hernia    • Hyperlipidemia    • Hypertension    • Injury of back    • Leg pain    • Leukocytosis, likely related to tobacco use    • Liver cyst    • Melanoma (CMS/HCC)     Superficial spreading lentiginous, right lower extremity   • Migraines    • PONV (postoperative nausea and vomiting)            Past Surgical History:   Procedure Laterality Date   • APPENDECTOMY     •  SECTION     • CHOLECYSTECTOMY     • COLONOSCOPY     • COLONOSCOPY N/A 2019    Procedure: COLONOSCOPY;  Surgeon: Jazmyn Cullen MD;  Location: [facility] OR;  Service: Gastroenterology   • ENDOSCOPY N/A 2018    Procedure: ESOPHAGOGASTRODUODENOSCOPY WITH BIOPSIES;  Surgeon: Jazmyn Cullen MD;  Location: [facility] OR;  Service: Gastroenterology   • HEMORRHOIDECTOMY     • HYSTEROSCOPY ENDOMETRIAL ABLATION     • OVARIAN CYST REMOVAL Left     Benign tumor   • OVARY SURGERY Left    • PELVIC LAPAROSCOPY     • SKIN BIOPSY     • SKIN CANCER EXCISION Right     Leg melanoma   • TUBAL ABDOMINAL LIGATION             Physical Exam   Constitutional: She is oriented to person, place, and time. She appears well-developed and well-nourished.   HENT:   Head: Normocephalic and atraumatic.   Right Ear: External ear normal.   Left Ear: External ear normal.   Musculoskeletal: She exhibits no edema or deformity.   Neurological: She is alert and oriented to person, place, and time.   Skin: Skin is warm and dry.   Psychiatric: She has a normal  mood and affect. Her behavior is normal.           There were no vitals taken for this visit.        Emmy was seen today for follow-up.    Diagnoses and all orders for this visit:    Nipple discharge    Breast mass    I will bring the mammogram to [facility] so I can review them with the radiologist and we discussed with her the day and time of her MRI.  We will follow up after that.    Thank you for allowing me to participate in the care of this interesting patient.

## 2019-02-18 ENCOUNTER — HOSPITAL ENCOUNTER (OUTPATIENT)
Dept: MRI IMAGING | Facility: HOSPITAL | Age: 39
Discharge: HOME | End: 2019-02-18

## 2019-02-18 DIAGNOSIS — N64.52 NIPPLE DISCHARGE: ICD-10-CM

## 2019-02-27 ENCOUNTER — OFFICE VISIT (OUTPATIENT)
Dept: SURGERY | Facility: CLINIC | Age: 39
End: 2019-02-27

## 2019-02-27 DIAGNOSIS — D35.2 PITUITARY MICROADENOMA (HCC): Primary | ICD-10-CM

## 2019-02-27 PROCEDURE — 99213 OFFICE O/P EST LOW 20 MIN: CPT | Performed by: SURGERY

## 2019-02-27 NOTE — PROGRESS NOTES
Emmy Dietz 38 y.o. female presents for  FU/DISCUSS MRI BRAIN.         HPI   Above noted and agree.  Emmy is doing well without complaints.  I personally reviewed her mammogram and breast ultrasound with the radiologist at [place] and all of the findings were negative.  The MRI showed a tiny mass on the pituitary gland.  Emmy still smokes.      Review of Systems        Past Medical History:   Diagnosis Date   • Anxiety    • Asthma    • Colon polyp    • Depression    • Endometriosis    • GERD (gastroesophageal reflux disease)    • Hard to intubate    • Hiatal hernia    • Hiatal hernia    • Hyperlipidemia    • Hypertension    • Injury of back    • Leg pain    • Leukocytosis, likely related to tobacco use    • Liver cyst    • Melanoma (CMS/HCC)     Superficial spreading lentiginous, right lower extremity   • Migraines    • PONV (postoperative nausea and vomiting)            Past Surgical History:   Procedure Laterality Date   • APPENDECTOMY     •  SECTION     • CHOLECYSTECTOMY     • COLONOSCOPY     • COLONOSCOPY N/A 2019    Procedure: COLONOSCOPY;  Surgeon: Jazmyn Cullen MD;  Location: [facility];  Service: Gastroenterology   • ENDOSCOPY N/A 2018    Procedure: ESOPHAGOGASTRODUODENOSCOPY WITH BIOPSIES;  Surgeon: Jazmyn Cullen MD;  Location: [facility] OR;  Service: Gastroenterology   • HEMORRHOIDECTOMY     • HYSTEROSCOPY ENDOMETRIAL ABLATION     • OVARIAN CYST REMOVAL Left     Benign tumor   • OVARY SURGERY Left    • PELVIC LAPAROSCOPY     • SKIN BIOPSY     • SKIN CANCER EXCISION Right     Leg melanoma   • TUBAL ABDOMINAL LIGATION             Physical Exam   Constitutional: She is oriented to person, place, and time. She appears well-developed and well-nourished.   HENT:   Head: Normocephalic and atraumatic.   Right Ear: External ear normal.   Left Ear: External ear normal.   Musculoskeletal: She exhibits no edema or deformity.   Neurological: She is alert and oriented to person, place, and  time.   Skin: Skin is warm and dry.   Psychiatric: She has a normal mood and affect. Her behavior is normal.           There were no vitals taken for this visit.        Emmy was seen today for follow-up.    Diagnoses and all orders for this visit:    Pituitary microadenoma (CMS/HCC)    We will refer her to neurosurgery for further evaluation.  We discussed that future mammograms need to be done at [facility] so I can review them with the radiologist.  We also discussed tobacco cessation.    Thank you for allowing me to participate in the care of this interesting patient.

## 2019-03-11 ENCOUNTER — HOSPITAL ENCOUNTER (EMERGENCY)
Facility: HOSPITAL | Age: 39
Discharge: HOME | End: 2019-03-11
Attending: EMERGENCY MEDICINE | Admitting: EMERGENCY MEDICINE

## 2019-03-11 ENCOUNTER — APPOINTMENT (OUTPATIENT)
Dept: CT IMAGING | Facility: HOSPITAL | Age: 39
End: 2019-03-11

## 2019-03-11 VITALS
HEART RATE: 62 BPM | BODY MASS INDEX: 27.32 KG/M2 | DIASTOLIC BLOOD PRESSURE: 85 MMHG | RESPIRATION RATE: 16 BRPM | TEMPERATURE: 97.9 F | OXYGEN SATURATION: 98 % | WEIGHT: 170 LBS | HEIGHT: 66 IN | SYSTOLIC BLOOD PRESSURE: 132 MMHG

## 2019-03-11 DIAGNOSIS — R10.84 GENERALIZED ABDOMINAL PAIN: Primary | ICD-10-CM

## 2019-03-11 DIAGNOSIS — K59.00 CONSTIPATION, UNSPECIFIED CONSTIPATION TYPE: ICD-10-CM

## 2019-03-11 DIAGNOSIS — R03.0 ELEVATED BLOOD PRESSURE READING: ICD-10-CM

## 2019-03-11 LAB
ALBUMIN SERPL-MCNC: 3.7 G/DL (ref 3.5–5.2)
ALBUMIN/GLOB SERPL: 1.3 G/DL
ALP SERPL-CCNC: 78 U/L (ref 40–129)
ALT SERPL W P-5'-P-CCNC: 88 U/L (ref 5–33)
ANION GAP SERPL CALCULATED.3IONS-SCNC: 9.5 MMOL/L
AST SERPL-CCNC: 29 U/L (ref 5–32)
BACTERIA UR QL AUTO: ABNORMAL /HPF
BASOPHILS # BLD AUTO: 0.04 10*3/MM3 (ref 0–0.2)
BASOPHILS NFR BLD AUTO: 0.4 % (ref 0–1.5)
BILIRUB SERPL-MCNC: 0.2 MG/DL (ref 0.2–1.2)
BILIRUB UR QL STRIP: NEGATIVE
BUN BLD-MCNC: 5 MG/DL (ref 6–20)
BUN/CREAT SERPL: 8.5 (ref 7–25)
CALCIUM SPEC-SCNC: 8.7 MG/DL (ref 8.6–10.5)
CHLORIDE SERPL-SCNC: 99 MMOL/L (ref 98–107)
CLARITY UR: ABNORMAL
CO2 SERPL-SCNC: 28.5 MMOL/L (ref 22–29)
COLOR UR: YELLOW
CREAT BLD-MCNC: 0.59 MG/DL (ref 0.57–1)
DEPRECATED RDW RBC AUTO: 43.9 FL (ref 37–54)
EOSINOPHIL # BLD AUTO: 0.66 10*3/MM3 (ref 0–0.4)
EOSINOPHIL NFR BLD AUTO: 6.1 % (ref 0.3–6.2)
ERYTHROCYTE [DISTWIDTH] IN BLOOD BY AUTOMATED COUNT: 13.1 % (ref 12.3–15.4)
GFR SERPL CREATININE-BSD FRML MDRD: 115 ML/MIN/1.73
GLOBULIN UR ELPH-MCNC: 2.8 GM/DL
GLUCOSE BLD-MCNC: 102 MG/DL (ref 65–99)
GLUCOSE UR STRIP-MCNC: NEGATIVE MG/DL
HCT VFR BLD AUTO: 40.7 % (ref 34–46.6)
HGB BLD-MCNC: 13.9 G/DL (ref 12–15.9)
HGB UR QL STRIP.AUTO: ABNORMAL
HYALINE CASTS UR QL AUTO: ABNORMAL /LPF
IMM GRANULOCYTES # BLD AUTO: 0.04 10*3/MM3 (ref 0–0.05)
IMM GRANULOCYTES NFR BLD AUTO: 0.4 % (ref 0–0.5)
KETONES UR QL STRIP: NEGATIVE
LEUKOCYTE ESTERASE UR QL STRIP.AUTO: NEGATIVE
LYMPHOCYTES # BLD AUTO: 2.03 10*3/MM3 (ref 0.7–3.1)
LYMPHOCYTES NFR BLD AUTO: 18.7 % (ref 19.6–45.3)
MCH RBC QN AUTO: 31.2 PG (ref 26.6–33)
MCHC RBC AUTO-ENTMCNC: 34.2 G/DL (ref 31.5–35.7)
MCV RBC AUTO: 91.5 FL (ref 79–97)
MONOCYTES # BLD AUTO: 0.59 10*3/MM3 (ref 0.1–0.9)
MONOCYTES NFR BLD AUTO: 5.4 % (ref 5–12)
NEUTROPHILS # BLD AUTO: 7.52 10*3/MM3 (ref 1.4–7)
NEUTROPHILS NFR BLD AUTO: 69 % (ref 42.7–76)
NITRITE UR QL STRIP: NEGATIVE
NRBC BLD AUTO-RTO: 0 /100 WBC (ref 0–0)
PH UR STRIP.AUTO: 7 [PH] (ref 4.5–8)
PLATELET # BLD AUTO: 212 10*3/MM3 (ref 140–450)
PMV BLD AUTO: 10.2 FL (ref 6–12)
POTASSIUM BLD-SCNC: 3.5 MMOL/L (ref 3.5–5.2)
PROT SERPL-MCNC: 6.5 G/DL (ref 6–8.5)
PROT UR QL STRIP: NEGATIVE
RBC # BLD AUTO: 4.45 10*6/MM3 (ref 3.77–5.28)
RBC # UR: ABNORMAL /HPF
REF LAB TEST METHOD: ABNORMAL
SODIUM BLD-SCNC: 137 MMOL/L (ref 136–145)
SP GR UR STRIP: 1.02 (ref 1–1.03)
SQUAMOUS #/AREA URNS HPF: ABNORMAL /HPF
UROBILINOGEN UR QL STRIP: ABNORMAL
WBC NRBC COR # BLD: 10.88 10*3/MM3 (ref 3.4–10.8)
WBC UR QL AUTO: ABNORMAL /HPF

## 2019-03-11 PROCEDURE — 0 IOPAMIDOL PER 1 ML: Performed by: EMERGENCY MEDICINE

## 2019-03-11 PROCEDURE — 74177 CT ABD & PELVIS W/CONTRAST: CPT

## 2019-03-11 PROCEDURE — 96372 THER/PROPH/DIAG INJ SC/IM: CPT

## 2019-03-11 PROCEDURE — 85025 COMPLETE CBC W/AUTO DIFF WBC: CPT | Performed by: EMERGENCY MEDICINE

## 2019-03-11 PROCEDURE — 80053 COMPREHEN METABOLIC PANEL: CPT | Performed by: EMERGENCY MEDICINE

## 2019-03-11 PROCEDURE — 81001 URINALYSIS AUTO W/SCOPE: CPT | Performed by: EMERGENCY MEDICINE

## 2019-03-11 PROCEDURE — 99283 EMERGENCY DEPT VISIT LOW MDM: CPT

## 2019-03-11 PROCEDURE — 99282 EMERGENCY DEPT VISIT SF MDM: CPT | Performed by: EMERGENCY MEDICINE

## 2019-03-11 RX ORDER — SODIUM CHLORIDE 0.9 % (FLUSH) 0.9 %
10 SYRINGE (ML) INJECTION AS NEEDED
Status: DISCONTINUED | OUTPATIENT
Start: 2019-03-11 | End: 2019-03-11 | Stop reason: HOSPADM

## 2019-03-11 RX ORDER — SODIUM CHLORIDE 9 MG/ML
INJECTION, SOLUTION INTRAVENOUS
Status: DISPENSED
Start: 2019-03-11 | End: 2019-03-11

## 2019-03-11 RX ORDER — POLYETHYLENE GLYCOL 3350 17 G/17G
17 POWDER, FOR SOLUTION ORAL 2 TIMES DAILY PRN
Qty: 20 EACH | Refills: 0 | Status: SHIPPED | OUTPATIENT
Start: 2019-03-11 | End: 2021-08-05

## 2019-03-11 RX ADMIN — IOPAMIDOL 100 ML: 755 INJECTION, SOLUTION INTRAVENOUS at 05:25

## 2019-03-11 RX ADMIN — METHYLNALTREXONE BROMIDE 8 MG: 12 INJECTION, SOLUTION SUBCUTANEOUS at 04:59

## 2019-03-11 RX ADMIN — POLYETHYLENE GLYCOL 3350 17 G: 17 POWDER, FOR SOLUTION ORAL at 06:45

## 2019-03-11 NOTE — ED PROVIDER NOTES
Subjective   History of Present Illness  History of Present Illness    Chief complaint: Abdominal pain, constipation    Location: Right-sided abdomen upper and lower    Quality/Severity: Moderate cramping, pressure pain    Timing/Duration: Worsening for the past couple of days    Modifying Factors: None    Narrative: This patient presents for evaluation of right-sided abdominal discomfort with pressure and cramping for at least one day.  She is 4 days postop from a laparoscopic hysterectomy which was performed by her gynecologist in the [facility].  Her surgery was reportedly uneventful and she was discharged home with a prescription for Percocet tablets as needed pain and also a stool softener.  She says she has been taking her Percocet tablet sparingly and also taking her stool softener tablets.  However, she still has not had any bowel movement since the day before her surgery.  She denies any fevers.  She denies any vomiting.  She denies any dysuria or hematuria.  She denies any vaginal bleeding or discharge.  She has had an appendectomy and cholecystectomy in the past.    Associated Symptoms: As above    Review of Systems   Constitutional: Positive for appetite change. Negative for activity change and fever.   Eyes: Negative for pain and visual disturbance.   Respiratory: Negative for cough and shortness of breath.    Cardiovascular: Negative for chest pain.   Gastrointestinal: Positive for abdominal pain and constipation. Negative for blood in stool and vomiting.   Genitourinary: Negative for dysuria, flank pain, hematuria and urgency.   Skin: Negative for color change and rash.   Neurological: Negative for syncope and headaches.   All other systems reviewed and are negative.      Past Medical History:   Diagnosis Date   • Anxiety    • Asthma    • Colon polyp    • Depression    • Endometriosis    • GERD (gastroesophageal reflux disease)    • Hard to intubate    • Hiatal hernia    • Hiatal  hernia    • Hyperlipidemia    • Hypertension    • Injury of back    • Leg pain    • Leukocytosis, likely related to tobacco use    • Liver cyst    • Melanoma (CMS/HCC)     Superficial spreading lentiginous, right lower extremity   • Migraines    • PONV (postoperative nausea and vomiting)        Allergies   Allergen Reactions   • Bactrim  [Sulfamethoxazole-Trimethoprim]    • Erythromycin Unknown (See Comments)   • Hydrocodone    • Lisinopril    • Lyrica [Pregabalin]    • Meperidine Hives   • Morphine    • Niacin Hives   • Other Swelling     Erythromycin ophthalmic   • Tramadol Unknown (See Comments)     UNKNOWN       Past Surgical History:   Procedure Laterality Date   • APPENDECTOMY     •  SECTION     • CHOLECYSTECTOMY     • COLONOSCOPY     • COLONOSCOPY N/A 2019    Procedure: COLONOSCOPY;  Surgeon: Jazmyn Cullen MD;  Location: [facility] OR;  Service: Gastroenterology   • ENDOSCOPY N/A 2018    Procedure: ESOPHAGOGASTRODUODENOSCOPY WITH BIOPSIES;  Surgeon: Jazmyn Cullen MD;  Location: [facility] OR;  Service: Gastroenterology   • HEMORRHOIDECTOMY     • HYSTERECTOMY     • HYSTEROSCOPY ENDOMETRIAL ABLATION     • OVARIAN CYST REMOVAL Left     Benign tumor   • OVARY SURGERY Left    • PELVIC LAPAROSCOPY     • SKIN BIOPSY     • SKIN CANCER EXCISION Right     Leg melanoma   • TUBAL ABDOMINAL LIGATION         Family History   Problem Relation Age of Onset   • Cervical cancer Mother 26   • Skin cancer Mother 54        Basal cell   • Diabetes Mother    • Colon polyps Mother    • Skin cancer Father 53        Non-melanoma   • Heart disease Father    • Hypertension Father    • Other Daughter         Enlarged spleen   • Brain cancer Other 60   • Pancreatic cancer Other    • Diabetes Other    • Heart disease Other    • Hypertension Other    • Colon cancer Maternal Uncle    • Breast cancer Maternal Grandmother        Social History     Socioeconomic History   • Marital status:      Spouse name: Not on  file   • Number of children: 2   • Years of education: 12   • Highest education level: Not on file   Occupational History     Employer: UNEMPLOYED   Tobacco Use   • Smoking status: Current Every Day Smoker     Packs/day: 0.50     Years: 10.00     Pack years: 5.00     Types: Cigarettes   • Smokeless tobacco: Never Used   Substance and Sexual Activity   • Alcohol use: Yes     Comment: occassionaly   • Drug use: No   • Sexual activity: Defer       ED Triage Vitals [03/11/19 0428]   Temp Heart Rate Resp BP SpO2   97.9 °F (36.6 °C) 77 18 (!) 179/110 97 %      Temp src Heart Rate Source Patient Position BP Location FiO2 (%)   Oral Monitor Sitting Right arm --         Objective   Physical Exam   Constitutional: She is oriented to person, place, and time. She appears well-developed and well-nourished.  Non-toxic appearance. She does not appear ill.   HENT:   Head: Normocephalic and atraumatic.   Eyes: EOM are normal. Pupils are equal, round, and reactive to light. Right eye exhibits no discharge. Left eye exhibits no discharge.   Neck: Normal range of motion. Neck supple.   Cardiovascular: Normal rate, regular rhythm, normal heart sounds and intact distal pulses.   No murmur heard.  Pulmonary/Chest: Effort normal. No stridor. No respiratory distress. She has no wheezes. She has no rales.   Abdominal: Soft. Normal appearance. She exhibits no pulsatile liver and no mass. There is generalized tenderness. There is no rebound and no guarding. No hernia.   All surgical wounds appear normal and well-healing without any complication.  The entire abdomen is moderately tender to palpation diffusely but there are no peritoneal signs elicited.   Musculoskeletal: Normal range of motion. She exhibits no edema or deformity.   Neurological: She is alert and oriented to person, place, and time.   Skin: Skin is warm and dry. No rash noted. No erythema. No pallor.   Psychiatric: She has a normal mood and affect. Her behavior is normal.  Judgment and thought content normal.   Nursing note and vitals reviewed.      Results for orders placed or performed during the hospital encounter of 03/11/19   Comprehensive Metabolic Panel   Result Value Ref Range    Glucose 102 (H) 65 - 99 mg/dL    BUN 5 (L) 6 - 20 mg/dL    Creatinine 0.59 0.57 - 1.00 mg/dL    Sodium 137 136 - 145 mmol/L    Potassium 3.5 3.5 - 5.2 mmol/L    Chloride 99 98 - 107 mmol/L    CO2 28.5 22.0 - 29.0 mmol/L    Calcium 8.7 8.6 - 10.5 mg/dL    Total Protein 6.5 6.0 - 8.5 g/dL    Albumin 3.70 3.50 - 5.20 g/dL    ALT (SGPT) 88 (H) 5 - 33 U/L    AST (SGOT) 29 5 - 32 U/L    Alkaline Phosphatase 78 40 - 129 U/L    Total Bilirubin 0.2 0.2 - 1.2 mg/dL    eGFR Non African Amer 115 >60 mL/min/1.73    Globulin 2.8 gm/dL    A/G Ratio 1.3 g/dL    BUN/Creatinine Ratio 8.5 7.0 - 25.0    Anion Gap 9.5 mmol/L   Urinalysis With Microscopic If Indicated (No Culture) - Urine, Clean Catch   Result Value Ref Range    Color, UA Yellow Yellow, Straw    Appearance, UA Slightly Cloudy (A) Clear    pH, UA 7.0 4.5 - 8.0    Specific Gravity, UA 1.020 1.003 - 1.030    Glucose, UA Negative Negative    Ketones, UA Negative Negative    Bilirubin, UA Negative Negative    Blood, UA Trace (A) Negative    Protein, UA Negative Negative    Leuk Esterase, UA Negative Negative    Nitrite, UA Negative Negative    Urobilinogen, UA 0.2 E.U./dL 0.2 - 1.0 E.U./dL   CBC Auto Differential   Result Value Ref Range    WBC 10.88 (H) 3.40 - 10.80 10*3/mm3    RBC 4.45 3.77 - 5.28 10*6/mm3    Hemoglobin 13.9 12.0 - 15.9 g/dL    Hematocrit 40.7 34.0 - 46.6 %    MCV 91.5 79.0 - 97.0 fL    MCH 31.2 26.6 - 33.0 pg    MCHC 34.2 31.5 - 35.7 g/dL    RDW 13.1 12.3 - 15.4 %    RDW-SD 43.9 37.0 - 54.0 fl    MPV 10.2 6.0 - 12.0 fL    Platelets 212 140 - 450 10*3/mm3    Neutrophil % 69.0 42.7 - 76.0 %    Lymphocyte % 18.7 (L) 19.6 - 45.3 %    Monocyte % 5.4 5.0 - 12.0 %    Eosinophil % 6.1 0.3 - 6.2 %    Basophil % 0.4 0.0 - 1.5 %    Immature Grans % 0.4  "0.0 - 0.5 %    Neutrophils, Absolute 7.52 (H) 1.40 - 7.00 10*3/mm3    Lymphocytes, Absolute 2.03 0.70 - 3.10 10*3/mm3    Monocytes, Absolute 0.59 0.10 - 0.90 10*3/mm3    Eosinophils, Absolute 0.66 (H) 0.00 - 0.40 10*3/mm3    Basophils, Absolute 0.04 0.00 - 0.20 10*3/mm3    Immature Grans, Absolute 0.04 0.00 - 0.05 10*3/mm3    nRBC 0.0 0.0 - 0.0 /100 WBC   Urinalysis, Microscopic Only - Urine, Clean Catch   Result Value Ref Range    RBC, UA None Seen None Seen /HPF    WBC, UA 0-2 (A) None Seen /HPF    Bacteria, UA Trace (A) None Seen /HPF    Squamous Epithelial Cells, UA 7-12 (A) None Seen, 0-2 /HPF    Hyaline Casts, UA None Seen None Seen /LPF    Methodology Manual Light Microscopy        RADIOLOGY        Study: CT abdomen and pelvis with contrast    Findings: Postoperative changes from recent hysterectomy with air to anterior abdominal wall and trace intraperitoneal air.  Ovaries retained.  Bladder air presumably due to [place] for surgery.  If no recent instrumentation please correlate with UA for cystitis.  Rectosigmoid anastomosis, cholecystectomy, appendectomy.    Interpreted contemporaneously with treatment by Dr. Gilliam, independently viewed by me    Procedures           ED Course  ED Course as of Mar 11 0634   Mon Mar 11, 2019   0633 I have reviewed the labs and the CT scan from today's visit.  All diagnostics are reassuring for no acute postoperative or intra-abdominal emergency problem.  I think her abdominal pain is partly due to postoperative recovery and also partly due to her constipation issue.  We gave her 1 dose of MiraLAX here and also one injection of Relistor.  Advised her to continue using MiraLAX at home and to really back off on her narcotic pain medication as much as possible.  Advised her to call her GYN surgeon today for update of her condition.  Reviewed with her the usual \"return to ER\" instructions for any worsening signs or symptoms.  Patient discharged home in good condition after " that.  [GEORGIE]      ED Course User Index  [GEORGIE] Yosi Mathew MD                  MDM  Number of Diagnoses or Management Options  Constipation, unspecified constipation type:   Elevated blood pressure reading:   Generalized abdominal pain:      Amount and/or Complexity of Data Reviewed  Clinical lab tests: reviewed and ordered  Tests in the radiology section of CPT®: reviewed and ordered  Decide to obtain previous medical records or to obtain history from someone other than the patient: yes  Review and summarize past medical records: yes  Independent visualization of images, tracings, or specimens: yes    Risk of Complications, Morbidity, and/or Mortality  Presenting problems: moderate  Diagnostic procedures: moderate  Management options: moderate          Final diagnoses:   Generalized abdominal pain   Constipation, unspecified constipation type   Elevated blood pressure reading            Yosi Mathew MD  03/11/19 0614

## 2019-03-11 NOTE — DISCHARGE INSTRUCTIONS
Gentle diet and plenty of fluids as tolerated.  Please return to the ER for any worsening pain, fevers, vomiting, weakness, difficulties urinating or any other concerns.

## 2019-03-12 ENCOUNTER — TELEPHONE (OUTPATIENT)
Dept: SURGERY | Facility: CLINIC | Age: 39
End: 2019-03-12

## 2019-03-12 NOTE — TELEPHONE ENCOUNTER
Pt called and reports she has not heard from neuro appt and she called their office.  She reports she was told they never rec'd request.  Confirmed req in chart sent 02/26/2019.  Informed pt req would be sent again today and please check with their office to ensure they rec'd.  Fax confirmation obtained.

## 2019-03-15 DIAGNOSIS — R90.89 MRI OF BRAIN ABNORMAL: ICD-10-CM

## 2019-03-15 DIAGNOSIS — N64.52 NIPPLE DISCHARGE: Primary | ICD-10-CM

## 2019-03-29 ENCOUNTER — OFFICE VISIT (OUTPATIENT)
Dept: NEUROLOGY | Facility: CLINIC | Age: 39
End: 2019-03-29

## 2019-03-29 ENCOUNTER — LAB (OUTPATIENT)
Dept: LAB | Facility: HOSPITAL | Age: 39
End: 2019-03-29

## 2019-03-29 VITALS
BODY MASS INDEX: 27.8 KG/M2 | HEIGHT: 66 IN | SYSTOLIC BLOOD PRESSURE: 148 MMHG | DIASTOLIC BLOOD PRESSURE: 90 MMHG | WEIGHT: 173 LBS

## 2019-03-29 DIAGNOSIS — E23.7 PITUITARY ABNORMALITY (HCC): ICD-10-CM

## 2019-03-29 DIAGNOSIS — E23.7 PITUITARY ABNORMALITY (HCC): Primary | ICD-10-CM

## 2019-03-29 LAB
FSH SERPL-ACNC: 6.64 MIU/ML
LH SERPL-ACNC: 6.86 MIU/ML
T4 FREE SERPL-MCNC: 0.93 NG/DL (ref 0.93–1.7)
TSH SERPL DL<=0.05 MIU/L-ACNC: 0.64 MIU/ML (ref 0.27–4.2)

## 2019-03-29 PROCEDURE — 36415 COLL VENOUS BLD VENIPUNCTURE: CPT

## 2019-03-29 PROCEDURE — 84439 ASSAY OF FREE THYROXINE: CPT

## 2019-03-29 PROCEDURE — 83001 ASSAY OF GONADOTROPIN (FSH): CPT

## 2019-03-29 PROCEDURE — 84305 ASSAY OF SOMATOMEDIN: CPT

## 2019-03-29 PROCEDURE — 83002 ASSAY OF GONADOTROPIN (LH): CPT

## 2019-03-29 PROCEDURE — 84443 ASSAY THYROID STIM HORMONE: CPT

## 2019-03-29 PROCEDURE — 82024 ASSAY OF ACTH: CPT

## 2019-03-29 PROCEDURE — 99243 OFF/OP CNSLTJ NEW/EST LOW 30: CPT | Performed by: PSYCHIATRY & NEUROLOGY

## 2019-03-29 PROCEDURE — 83003 ASSAY GROWTH HORMONE (HGH): CPT

## 2019-03-29 RX ORDER — TRAZODONE HYDROCHLORIDE 150 MG/1
TABLET ORAL
Refills: 2 | COMMUNITY
Start: 2019-03-06 | End: 2019-09-30

## 2019-03-29 RX ORDER — ERGOCALCIFEROL 1.25 MG/1
CAPSULE ORAL
Refills: 1 | COMMUNITY
Start: 2019-03-06

## 2019-03-29 RX ORDER — BUPROPION HYDROCHLORIDE 300 MG/1
TABLET ORAL
Refills: 1 | COMMUNITY
Start: 2019-03-06 | End: 2021-08-05

## 2019-03-29 NOTE — PROGRESS NOTES
Emmy Dietz is a 38 y.o. female presents for                CHIEF COMPLAINT:  Neuro-oncology consultation for abnormal MRI scan of pituitary from Dr. Martha Hawkins    History of Present Illness  A 38 JOURDAN, right-handed comes unaccompanied.  She has had a long term history of galactorrhea  She recalls from the time she was nursing her now 16 year old daughter that she would have milky discharge from her nipples  She has had lifelong history of irregular menses with menorrhagia at times  She has a 14 year old son.  Six years ago she underwent ablation for her menorrhagia and few weeks ago she has hysterectomy.  Due to the galactorrhea she had MRI scan done on February 05, 2019. The scan was first done of the brain then she was called back to have the pituitary imaged as well.  She has had long history of migraines: we will address that in a follow up visit if she likes as she is currently into seeing anyone for them.  Her hormonal status review is non-remarkable except for the above      The following portions of the patient's history were reviewed and updated as appropriate: allergies, current medications, past family history, past medical history, past social history, past surgical history and problem list.  I have independently completed her ROS, PH, SH, FH, medications and allergies      Review of Systems   Constitutional: Positive for diaphoresis.   HENT: Positive for rhinorrhea, sinus pressure, sneezing and tinnitus.    Eyes: Positive for pain and itching.   Endocrine: Positive for polyphagia.   Musculoskeletal: Positive for back pain, myalgias and neck pain.   Allergic/Immunologic: Positive for environmental allergies.   Neurological: Positive for dizziness, light-headedness and headaches. Negative for tremors, seizures, syncope, facial asymmetry, speech difficulty, weakness and numbness.   Hematological: Negative for adenopathy. Bruises/bleeds easily.   Psychiatric/Behavioral: Negative for agitation,  behavioral problems, confusion, decreased concentration, dysphoric mood, hallucinations, self-injury, sleep disturbance and suicidal ideas. The patient is not nervous/anxious and is not hyperactive.          Past Medical History:   Diagnosis Date   • Anxiety    • Asthma    • Colon polyp    • Depression    • Endometriosis    • GERD (gastroesophageal reflux disease)    • Hard to intubate    • Hiatal hernia    • Hiatal hernia    • Hyperlipidemia    • Hypertension    • Injury of back    • Leg pain    • Leukocytosis, likely related to tobacco use    • Liver cyst    • Melanoma (CMS/HCC)     Superficial spreading lentiginous, right lower extremity   • Migraines    • PONV (postoperative nausea and vomiting)          Social History     Socioeconomic History   • Marital status:      Spouse name: Not on file   • Number of children: 2   • Years of education: 12   • Highest education level: Not on file   Occupational History     Employer: UNEMPLOYED   Tobacco Use   • Smoking status: Current Every Day Smoker     Packs/day: 1.00     Years: 10.00     Pack years: 10.00     Types: Cigarettes     Start date: 1997   • Smokeless tobacco: Never Used   Substance and Sexual Activity   • Alcohol use: Yes     Comment: occassionaly   • Drug use: No   • Sexual activity: Defer          Family History   Problem Relation Age of Onset   • Cervical cancer Mother 26   • Skin cancer Mother 54        Basal cell   • Diabetes Mother    • Colon polyps Mother    • Kidney disease Mother    • Skin cancer Father 53        Non-melanoma   • Heart disease Father    • Hypertension Father    • Stroke Father    • Other Daughter         Enlarged spleen   • Migraines Daughter    • Brain cancer Other 60   • Pancreatic cancer Other    • Diabetes Other    • Heart disease Other    • Hypertension Other    • Colon cancer Maternal Uncle    • Breast cancer Maternal Grandmother    • Diabetes Maternal Grandmother    • Heart disease Paternal Grandmother    • Stroke  Paternal Grandfather            Current Outpatient Medications:   •  albuterol (PROVENTIL) (2.5 MG/3ML) 0.083% nebulizer solution, Take 2.5 mg by nebulization Every 4 (Four) Hours As Needed for Wheezing., Disp: , Rfl:   •  albuterol (VENTOLIN HFA) 108 (90 BASE) MCG/ACT inhaler, Ventolin  (90 Base) MCG/ACT Inhalation Aerosol Solution; Patient Sig: Ventolin  (90 Base) MCG/ACT Inhalation Aerosol Solution ; 0; 03-Mar-2015; Active, Disp: , Rfl:   •  amLODIPine (NORVASC) 5 MG tablet, Take 5 mg by mouth Daily., Disp: , Rfl:   •  dicyclomine (BENTYL) 10 MG capsule, Take 1 capsule by mouth 4 (Four) Times a Day Before Meals & at Bedtime., Disp: 90 capsule, Rfl: 2  •  meclizine (ANTIVERT) 25 MG tablet, Take 1 tablet by mouth 4 (Four) Times a Day As Needed for dizziness., Disp: 30 tablet, Rfl: 0  •  omeprazole (priLOSEC) 40 MG capsule, , Disp: , Rfl:   •  polyethylene glycol (MIRALAX) packet, Take 17 g by mouth 2 (Two) Times a Day As Needed (constipation)., Disp: 20 each, Rfl: 0  •  sertraline (ZOLOFT) 100 MG tablet, Take 100 mg by mouth Daily., Disp: , Rfl:   •  sucralfate (CARAFATE) 1 g tablet, Take 1 tablet by mouth 4 (Four) Times a Day., Disp: 90 tablet, Rfl: 3      There were no vitals filed for this visit.      Physical Exam   Constitutional: She is oriented to person, place, and time. She appears well-developed and well-nourished. No distress.   HENT:   Head: Normocephalic and atraumatic.   Eyes: EOM are normal. Pupils are equal, round, and reactive to light.   Neurological: She is oriented to person, place, and time. She has normal strength. She has a normal Finger-Nose-Finger Test, a normal Heel to Shin Test, a normal Romberg Test and a normal Tandem Gait Test. Gait normal.   Reflex Scores:       Tricep reflexes are 1+ on the right side and 1+ on the left side.       Bicep reflexes are 1+ on the right side and 1+ on the left side.       Brachioradialis reflexes are 1+ on the right side and 1+ on the left  side.       Patellar reflexes are 1+ on the right side and 1+ on the left side.       Achilles reflexes are 1+ on the right side and 1+ on the left side.  Skin: She is not diaphoretic.   Psychiatric: She has a normal mood and affect. Her speech is normal and behavior is normal. Judgment and thought content normal.         Neurologic Exam     Mental Status   Oriented to person, place, and time.   Registration: recalls 3 of 3 objects. Recall at 5 minutes: recalls 3 of 3 objects. Follows 3 step commands.   Attention: normal. Concentration: normal.   Speech: speech is normal   Level of consciousness: alert  Knowledge: consistent with education. Able to perform simple calculations.   Able to name object. Able to read. Able to repeat. Able to write. Normal comprehension.     Cranial Nerves     CN II   Visual fields full to confrontation.     CN III, IV, VI   Pupils are equal, round, and reactive to light.  Extraocular motions are normal.   Right pupil: Size: 4 mm. Shape: regular. Reactivity: brisk. Consensual response: intact. Accommodation: intact.   Left pupil: Size: 4 mm. Shape: regular. Reactivity: brisk. Consensual response: intact. Accommodation: intact.   CN III: no CN III palsy  CN VI: no CN VI palsy  Ophthalmoparesis: none  Upgaze: normal  Downgaze: normal  Conjugate gaze: present    CN V   Facial sensation intact.     CN VII   Facial expression full, symmetric.     CN VIII   CN VIII normal.     CN IX, X   CN IX normal.     CN XI   CN XI normal.     CN XII   CN XII normal.     Motor Exam   Muscle bulk: normal  Overall muscle tone: normal  Right arm tone: normal  Left arm tone: normal  Right arm pronator drift: absent  Left arm pronator drift: absent  Right leg tone: normal  Left leg tone: normal    Strength   Strength 5/5 throughout.     Sensory Exam   Light touch normal.   Vibration normal.   Proprioception normal.   Pinprick normal.   Graphesthesia: normal  Stereognosis: normal    Gait, Coordination, and  Reflexes     Gait  Gait: normal    Coordination   Romberg: negative  Finger to nose coordination: normal  Heel to shin coordination: normal  Tandem walking coordination: normal    Tremor   Resting tremor: absent  Intention tremor: absent  Action tremor: absent    Reflexes   Right brachioradialis: 1+  Left brachioradialis: 1+  Right biceps: 1+  Left biceps: 1+  Right triceps: 1+  Left triceps: 1+  Right patellar: 1+  Left patellar: 1+  Right achilles: 1+  Left achilles: 1+  Right : 1+  Left : 1+  Right Meadows: absent  Left Meadows: absent  Right ankle clonus: absent  Left ankle clonus: absent  Right pendular knee jerk: absent  Left pendular knee jerk: absent          Admission on 03/11/2019, Discharged on 03/11/2019   Component Date Value Ref Range Status   • Glucose 03/11/2019 102* 65 - 99 mg/dL Final   • BUN 03/11/2019 5* 6 - 20 mg/dL Final   • Creatinine 03/11/2019 0.59  0.57 - 1.00 mg/dL Final   • Sodium 03/11/2019 137  136 - 145 mmol/L Final   • Potassium 03/11/2019 3.5  3.5 - 5.2 mmol/L Final   • Chloride 03/11/2019 99  98 - 107 mmol/L Final   • CO2 03/11/2019 28.5  22.0 - 29.0 mmol/L Final   • Calcium 03/11/2019 8.7  8.6 - 10.5 mg/dL Final   • Total Protein 03/11/2019 6.5  6.0 - 8.5 g/dL Final   • Albumin 03/11/2019 3.70  3.50 - 5.20 g/dL Final   • ALT (SGPT) 03/11/2019 88* 5 - 33 U/L Final   • AST (SGOT) 03/11/2019 29  5 - 32 U/L Final   • Alkaline Phosphatase 03/11/2019 78  40 - 129 U/L Final   • Total Bilirubin 03/11/2019 0.2  0.2 - 1.2 mg/dL Final   • eGFR Non African Amer 03/11/2019 115  >60 mL/min/1.73 Final   • Globulin 03/11/2019 2.8  gm/dL Final   • A/G Ratio 03/11/2019 1.3  g/dL Final   • BUN/Creatinine Ratio 03/11/2019 8.5  7.0 - 25.0 Final   • Anion Gap 03/11/2019 9.5  mmol/L Final   • Color, UA 03/11/2019 Yellow  Yellow, Straw Final   • Appearance,  03/11/2019 Slightly Cloudy* Clear Final   • pH,  03/11/2019 7.0  4.5 - 8.0 Final   • Specific Gravity,  03/11/2019 1.020  1.003 - 1.030  Final   • Glucose, UA 03/11/2019 Negative  Negative Final   • Ketones, UA 03/11/2019 Negative  Negative Final   • Bilirubin, UA 03/11/2019 Negative  Negative Final   • Blood, UA 03/11/2019 Trace* Negative Final   • Protein, UA 03/11/2019 Negative  Negative Final   • Leuk Esterase, UA 03/11/2019 Negative  Negative Final   • Nitrite, UA 03/11/2019 Negative  Negative Final   • Urobilinogen, UA 03/11/2019 0.2 E.U./dL  0.2 - 1.0 E.U./dL Final   • WBC 03/11/2019 10.88* 3.40 - 10.80 10*3/mm3 Final   • RBC 03/11/2019 4.45  3.77 - 5.28 10*6/mm3 Final   • Hemoglobin 03/11/2019 13.9  12.0 - 15.9 g/dL Final   • Hematocrit 03/11/2019 40.7  34.0 - 46.6 % Final   • MCV 03/11/2019 91.5  79.0 - 97.0 fL Final   • MCH 03/11/2019 31.2  26.6 - 33.0 pg Final   • MCHC 03/11/2019 34.2  31.5 - 35.7 g/dL Final   • RDW 03/11/2019 13.1  12.3 - 15.4 % Final   • RDW-SD 03/11/2019 43.9  37.0 - 54.0 fl Final   • MPV 03/11/2019 10.2  6.0 - 12.0 fL Final   • Platelets 03/11/2019 212  140 - 450 10*3/mm3 Final   • Neutrophil % 03/11/2019 69.0  42.7 - 76.0 % Final   • Lymphocyte % 03/11/2019 18.7* 19.6 - 45.3 % Final   • Monocyte % 03/11/2019 5.4  5.0 - 12.0 % Final   • Eosinophil % 03/11/2019 6.1  0.3 - 6.2 % Final   • Basophil % 03/11/2019 0.4  0.0 - 1.5 % Final   • Immature Grans % 03/11/2019 0.4  0.0 - 0.5 % Final   • Neutrophils, Absolute 03/11/2019 7.52* 1.40 - 7.00 10*3/mm3 Final   • Lymphocytes, Absolute 03/11/2019 2.03  0.70 - 3.10 10*3/mm3 Final   • Monocytes, Absolute 03/11/2019 0.59  0.10 - 0.90 10*3/mm3 Final   • Eosinophils, Absolute 03/11/2019 0.66* 0.00 - 0.40 10*3/mm3 Final   • Basophils, Absolute 03/11/2019 0.04  0.00 - 0.20 10*3/mm3 Final   • Immature Grans, Absolute 03/11/2019 0.04  0.00 - 0.05 10*3/mm3 Final   • nRBC 03/11/2019 0.0  0.0 - 0.0 /100 WBC Final   • RBC, UA 03/11/2019 None Seen  None Seen /HPF Final   • WBC, UA 03/11/2019 0-2* None Seen /HPF Final   • Bacteria, UA 03/11/2019 Trace* None Seen /HPF Final   • Squamous  Epithelial Cells, UA 03/11/2019 7-12* None Seen, 0-2 /HPF Final   • Hyaline Casts, UA 03/11/2019 None Seen  None Seen /LPF Final   • Methodology 03/11/2019 Manual Light Microscopy   Final            REVIEW OF STUDIES:  Independent review of the bernie/pituitary MRI is that of a 2 mm abnormality non-enhancing in the posterior pituitary else normal study  I brought up the study and reviewed with her. 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

## 2019-03-31 LAB
GH SERPL-MCNC: 0.4 NG/ML (ref 0–10)
IGF-I SERPL-MCNC: 121 NG/ML (ref 69–227)

## 2019-04-01 LAB — ACTH PLAS-MCNC: 10.9 PG/ML (ref 7.2–63.3)

## 2019-04-23 ENCOUNTER — CLINICAL SUPPORT (OUTPATIENT)
Dept: OTHER | Facility: HOSPITAL | Age: 39
End: 2019-04-23
Attending: INTERNAL MEDICINE

## 2019-04-23 DIAGNOSIS — C43.9 MALIGNANT MELANOMA, UNSPECIFIED SITE (HCC): Primary | ICD-10-CM

## 2019-04-23 PROCEDURE — G0463 HOSPITAL OUTPT CLINIC VISIT: HCPCS

## 2019-04-23 NOTE — PATIENT INSTRUCTIONS
Pt seen by Dr. Henderson for genetic counseling and testing. Lab drawn with 21g butterfly needle in Right AC x 1 attempt. Sent to [employer]. Pt informed she would receive a phone call when test results.

## 2019-04-26 ENCOUNTER — OFFICE VISIT (OUTPATIENT)
Dept: NEUROLOGY | Facility: CLINIC | Age: 39
End: 2019-04-26

## 2019-04-26 VITALS
WEIGHT: 183 LBS | DIASTOLIC BLOOD PRESSURE: 78 MMHG | HEIGHT: 66 IN | HEART RATE: 78 BPM | OXYGEN SATURATION: 98 % | BODY MASS INDEX: 29.41 KG/M2 | SYSTOLIC BLOOD PRESSURE: 136 MMHG

## 2019-04-26 DIAGNOSIS — R79.89 ABNORMAL PITUITARY FOLLICLE STIMULATING HORMONE (FSH): Primary | ICD-10-CM

## 2019-04-26 PROCEDURE — 99212 OFFICE O/P EST SF 10 MIN: CPT | Performed by: PSYCHIATRY & NEUROLOGY

## 2019-04-26 RX ORDER — TRAZODONE HYDROCHLORIDE 50 MG/1
50 TABLET ORAL
Refills: 2 | COMMUNITY
Start: 2019-03-26 | End: 2022-10-26

## 2019-04-26 RX ORDER — LORATADINE 10 MG/1
10 TABLET ORAL DAILY
Refills: 2 | COMMUNITY
Start: 2019-04-12

## 2019-04-26 RX ORDER — ONDANSETRON 4 MG/1
TABLET, ORALLY DISINTEGRATING ORAL
Refills: 0 | COMMUNITY
Start: 2019-04-12 | End: 2020-11-19 | Stop reason: SDUPTHER

## 2019-04-26 NOTE — PROGRESS NOTES
"Follow Up Visit:Abnormal pituitary    Emmy is seen in follow up for her abnormal pituitary Neurological Update: nothing new  She has continued to have migraines (unrelated)    Review of Systems   Constitutional: Negative for chills, fatigue and fever.   HENT: Positive for tinnitus. Negative for hearing loss and trouble swallowing.    Eyes: Positive for photophobia. Negative for pain and itching.   Respiratory: Negative for cough, shortness of breath and wheezing.    Cardiovascular: Negative for chest pain, palpitations and leg swelling.   Gastrointestinal: Positive for nausea. Negative for constipation, diarrhea and vomiting.   Endocrine: Negative for cold intolerance, heat intolerance and polydipsia.   Genitourinary: Negative for decreased urine volume, difficulty urinating and urgency.   Musculoskeletal: Negative for back pain, neck pain and neck stiffness.   Skin: Negative for color change, rash and wound.   Allergic/Immunologic: Positive for environmental allergies. Negative for food allergies and immunocompromised state.   Neurological: Positive for dizziness, light-headedness and headaches. Negative for tremors, seizures, syncope, facial asymmetry, speech difficulty, weakness and numbness.   Hematological: Negative for adenopathy. Does not bruise/bleed easily.   Psychiatric/Behavioral: Negative for agitation, behavioral problems, confusion, decreased concentration, dysphoric mood, hallucinations, self-injury, sleep disturbance and suicidal ideas. The patient is not nervous/anxious and is not hyperactive.              Vitals:    04/26/19 1123   BP: 136/78   Pulse: 78   SpO2: 98%   Weight: 83 kg (183 lb)   Height: 167.6 cm (66\")     Her Pituitary labs were reviewed with her  As we did briefly revisit her brain/Pituitary MRI scan  ACTH, ILF-1, FSH, LH, GH are all normal    She now ahs ahd hysterectomy (gyn-related not pituitary-related)    She would like to see me for her migrianes and I am happy to do that for " her  PLAN:    Ambulatory Pituitary MRI referral 6 months  See me in follow-up spot for management of migraines

## 2019-07-09 ENCOUNTER — OFFICE VISIT (OUTPATIENT)
Dept: ORTHOPEDIC SURGERY | Facility: CLINIC | Age: 39
End: 2019-07-09

## 2019-07-09 VITALS
BODY MASS INDEX: 28.77 KG/M2 | HEART RATE: 69 BPM | HEIGHT: 66 IN | SYSTOLIC BLOOD PRESSURE: 125 MMHG | WEIGHT: 179 LBS | DIASTOLIC BLOOD PRESSURE: 83 MMHG

## 2019-07-09 DIAGNOSIS — R52 PAIN: Primary | ICD-10-CM

## 2019-07-09 DIAGNOSIS — M70.21 OLECRANON BURSITIS OF RIGHT ELBOW: ICD-10-CM

## 2019-07-09 PROCEDURE — 99203 OFFICE O/P NEW LOW 30 MIN: CPT | Performed by: ORTHOPAEDIC SURGERY

## 2019-07-09 PROCEDURE — 73080 X-RAY EXAM OF ELBOW: CPT | Performed by: ORTHOPAEDIC SURGERY

## 2019-07-09 RX ORDER — AMLODIPINE BESYLATE 10 MG/1
10 TABLET ORAL DAILY
Refills: 2 | COMMUNITY
Start: 2019-06-21 | End: 2019-09-18

## 2019-07-09 RX ORDER — METHYLPREDNISOLONE 4 MG/1
TABLET ORAL
Qty: 1 EACH | Refills: 0 | Status: SHIPPED | OUTPATIENT
Start: 2019-07-09 | End: 2019-09-30

## 2019-07-09 RX ORDER — ATORVASTATIN CALCIUM 10 MG/1
10 TABLET, FILM COATED ORAL DAILY
Refills: 2 | COMMUNITY
Start: 2019-06-21 | End: 2021-02-23 | Stop reason: ALTCHOICE

## 2019-07-09 RX ORDER — MELOXICAM 7.5 MG/1
7.5 TABLET ORAL DAILY
Qty: 30 TABLET | Refills: 0 | Status: SHIPPED | OUTPATIENT
Start: 2019-07-09 | End: 2019-09-18

## 2019-07-09 NOTE — PROGRESS NOTES
Subjective:     Patient ID: Emmy Dietz is a 38 y.o. female.    Chief Complaint: right elbow pain, new problem    History of Present Illness  Emmy Dietz returns to clinic today for evaluation of right elbow. She states 4 weeks ago she slipped and fell onto her right elbow. She experienced immediate pain which has not improved since the injury.  She rates the pain as 5/10, describes it as aching, burning and throbbing in nature.  Localizes pain to posterior aspect. Denies any improving factors.  Symptoms are exacerbated with laying on her right side and picking things up. Denies prior injury. She experiences radiation of her pain down into her right hand and fingers. She also experiences numbness in her index and long finger of her right hand. She is right hand dominant.     Social History     Occupational History     Employer: UNEMPLOYED   Tobacco Use   • Smoking status: Current Every Day Smoker     Packs/day: 1.00     Years: 10.00     Pack years: 10.00     Types: Cigarettes     Start date:    • Smokeless tobacco: Never Used   Substance and Sexual Activity   • Alcohol use: Yes     Comment: occassionaly   • Drug use: No   • Sexual activity: Defer      Past Medical History:   Diagnosis Date   • Anxiety    • Asthma    • Colon polyp    • Depression    • Endometriosis    • GERD (gastroesophageal reflux disease)    • Hard to intubate    • Hiatal hernia    • Hiatal hernia    • Hyperlipidemia    • Hypertension    • Injury of back    • Leg pain    • Leukocytosis, likely related to tobacco use    • Liver cyst    • Melanoma (CMS/HCC)     Superficial spreading lentiginous, right lower extremity   • Migraines    • PONV (postoperative nausea and vomiting)      Past Surgical History:   Procedure Laterality Date   • APPENDECTOMY     •  SECTION     • CHOLECYSTECTOMY     • COLONOSCOPY     • COLONOSCOPY N/A 2019    Procedure: COLONOSCOPY;  Surgeon: Jazmyn Cullen MD;  Location: [facility];  Service:  Gastroenterology   • ENDOSCOPY N/A 12/28/2018    Procedure: ESOPHAGOGASTRODUODENOSCOPY WITH BIOPSIES;  Surgeon: Jazmyn Cullen MD;  Location: [facility];  Service: Gastroenterology   • HEMORRHOIDECTOMY     • HYSTERECTOMY     • HYSTEROSCOPY ENDOMETRIAL ABLATION     • KNEE SURGERY     • OVARIAN CYST REMOVAL Left     Benign tumor   • OVARY SURGERY Left 2000   • PELVIC LAPAROSCOPY     • SKIN BIOPSY     • SKIN CANCER EXCISION Right     Leg melanoma   • TUBAL ABDOMINAL LIGATION         Family History   Problem Relation Age of Onset   • Cervical cancer Mother 26   • Skin cancer Mother 54        Basal cell   • Diabetes Mother    • Colon polyps Mother    • Kidney disease Mother    • Skin cancer Father 53        Non-melanoma   • Heart disease Father    • Hypertension Father    • Stroke Father    • Other Daughter         Enlarged spleen   • Migraines Daughter    • Brain cancer Other 60   • Pancreatic cancer Other    • Diabetes Other    • Heart disease Other    • Hypertension Other    • Colon cancer Maternal Uncle    • Breast cancer Maternal Grandmother    • Diabetes Maternal Grandmother    • Heart disease Paternal Grandmother    • Stroke Paternal Grandfather          Review of Systems   Constitutional: Negative for chills, diaphoresis, fever and unexpected weight change.   HENT: Negative for hearing loss, nosebleeds, sore throat and tinnitus.    Eyes: Negative for pain and visual disturbance.   Respiratory: Negative for cough, shortness of breath and wheezing.    Cardiovascular: Negative for chest pain and palpitations.   Gastrointestinal: Negative for abdominal pain, diarrhea, nausea and vomiting.   Endocrine: Negative for cold intolerance, heat intolerance and polydipsia.   Genitourinary: Negative for difficulty urinating, dysuria and hematuria.   Musculoskeletal: Positive for arthralgias and myalgias. Negative for joint swelling.   Skin: Negative for rash and wound.   Allergic/Immunologic: Negative for environmental  "allergies.   Neurological: Negative for dizziness, syncope and numbness.   Hematological: Does not bruise/bleed easily.   Psychiatric/Behavioral: Negative for dysphoric mood and sleep disturbance. The patient is not nervous/anxious.            Objective:  Vitals:    07/09/19 1317   BP: 125/83   BP Location: Right arm   Patient Position: Sitting   Cuff Size: Large Adult   Pulse: 69   Weight: 81.2 kg (179 lb)   Height: 167.6 cm (66\")         07/09/19  1317   Weight: 81.2 kg (179 lb)     Body mass index is 28.89 kg/m².  Physical Exam    Vital signs reviewed.   General: No acute distress, alert and oriented  Eyes: conjunctiva clear; pupils equally round and reactive  ENT: external ears and nose atraumatic; oropharynx clear  CV: no peripheral edema  Resp: normal respiratory effort  Skin: no rashes or wounds; normal turgor  Psych: mood and affect appropriate; recent and remote memory intact      Ortho Exam       Right Elbow-  ROM 0-155  4/5 strength on flexion  4+/5 strength on extension  Pain localized to olecranon bursa with moderate swelling  Positive radiation of pain with percussion  Mildly positive Tinel's over the cubital tunnel  Negative Tinnel's over the radial tunnel  Decreased sensation palmar index and long fingers  2+ radial pulse    Imaging:    Right Elbow X-Ray  Indication: Pain  Views: AP and Lateral views    Findings:  No fracture  No bony lesion  Normal soft tissues  Normal joint spaces    No prior studies were available for comparison.      Assessment:        1. Pain    2. Olecranon bursitis of right elbow           Plan:          1. Discussed treatment options at length with patient at today's visit.   2. Advised patient to perform soft tissue massage and apply Pennsaid samples that were given today.  3. Prescribed Medrol Dosepak and Meloxicam today for pain and inflammation of the right elbow.  4. Follow-up with me in 6 weeks, if the pain has not resolved we may consider a cortisone injection at that " time    Emmy Dietz was in agreement with plan and had all questions answered.     Orders:  Orders Placed This Encounter   Procedures   • XR Elbow 3+ View Right       Medications:  New Medications Ordered This Visit   Medications   • methylPREDNISolone (MEDROL, LISBETH,) 4 MG tablet     Sig: Take as directed on package instructions.     Dispense:  1 each     Refill:  0   • meloxicam (MOBIC) 7.5 MG tablet     Sig: Take 1 tablet by mouth Daily.     Dispense:  30 tablet     Refill:  0       Followup:  Return in about 6 weeks (around 8/20/2019).    Emmy was seen today for pain.    Diagnoses and all orders for this visit:    Pain  -     XR Elbow 3+ View Right    Olecranon bursitis of right elbow    Other orders  -     methylPREDNISolone (MEDROL, LISBETH,) 4 MG tablet; Take as directed on package instructions.  -     meloxicam (MOBIC) 7.5 MG tablet; Take 1 tablet by mouth Daily.         By signing my name here, I Neema Lopez, attest that all documentation on 07/09/19 at 3:06 PM has been prepared under the direction and in the presence of Dr. Lele Garzon.    I, Dr. Lele Garzon, personally performed the services described in this documentation, as scribed by Neema Lopez, in my presence, and it is both accurate and complete.      Dictated utilizing Dragon dictation

## 2019-08-12 RX ORDER — DICYCLOMINE HCL 20 MG
TABLET ORAL
Qty: 45 TABLET | Refills: 1 | Status: SHIPPED | OUTPATIENT
Start: 2019-08-12 | End: 2019-09-09 | Stop reason: SDUPTHER

## 2019-08-12 RX ORDER — SUCRALFATE 1 G/1
TABLET ORAL
Qty: 90 TABLET | Refills: 3 | Status: SHIPPED | OUTPATIENT
Start: 2019-08-12 | End: 2019-12-13 | Stop reason: SDUPTHER

## 2019-08-27 ENCOUNTER — HOSPITAL ENCOUNTER (OUTPATIENT)
Dept: MRI IMAGING | Facility: HOSPITAL | Age: 39
End: 2019-08-27

## 2019-09-06 ENCOUNTER — HOSPITAL ENCOUNTER (OUTPATIENT)
Dept: MRI IMAGING | Facility: HOSPITAL | Age: 39
Discharge: HOME | End: 2019-09-06
Admitting: PSYCHIATRY & NEUROLOGY

## 2019-09-06 DIAGNOSIS — R79.89 ABNORMAL PITUITARY FOLLICLE STIMULATING HORMONE (FSH): ICD-10-CM

## 2019-09-06 LAB — CREAT BLDA-MCNC: 0.8 MG/DL (ref 0.6–1.3)

## 2019-09-06 PROCEDURE — 0 GADOBENATE DIMEGLUMINE 529 MG/ML SOLUTION: Performed by: PSYCHIATRY & NEUROLOGY

## 2019-09-06 PROCEDURE — A9577 INJ MULTIHANCE: HCPCS | Performed by: PSYCHIATRY & NEUROLOGY

## 2019-09-06 PROCEDURE — 82565 ASSAY OF CREATININE: CPT

## 2019-09-06 PROCEDURE — 70553 MRI BRAIN STEM W/O & W/DYE: CPT

## 2019-09-06 RX ADMIN — GADOBENATE DIMEGLUMINE 17 ML: 529 INJECTION, SOLUTION INTRAVENOUS at 14:13

## 2019-09-10 ENCOUNTER — OFFICE VISIT (OUTPATIENT)
Dept: ORTHOPEDIC SURGERY | Facility: CLINIC | Age: 39
End: 2019-09-10

## 2019-09-10 VITALS — BODY MASS INDEX: 30.37 KG/M2 | HEIGHT: 66 IN | WEIGHT: 189 LBS

## 2019-09-10 DIAGNOSIS — M70.21 OLECRANON BURSITIS OF RIGHT ELBOW: Primary | ICD-10-CM

## 2019-09-10 PROCEDURE — 20605 DRAIN/INJ JOINT/BURSA W/O US: CPT | Performed by: ORTHOPAEDIC SURGERY

## 2019-09-10 PROCEDURE — 99213 OFFICE O/P EST LOW 20 MIN: CPT | Performed by: ORTHOPAEDIC SURGERY

## 2019-09-10 RX ORDER — LIDOCAINE HYDROCHLORIDE 10 MG/ML
2 INJECTION, SOLUTION INFILTRATION; PERINEURAL
Status: COMPLETED | OUTPATIENT
Start: 2019-09-10 | End: 2019-09-10

## 2019-09-10 RX ORDER — TRIAMCINOLONE ACETONIDE 40 MG/ML
40 INJECTION, SUSPENSION INTRA-ARTICULAR; INTRAMUSCULAR
Status: COMPLETED | OUTPATIENT
Start: 2019-09-10 | End: 2019-09-10

## 2019-09-10 RX ADMIN — LIDOCAINE HYDROCHLORIDE 2 ML: 10 INJECTION, SOLUTION INFILTRATION; PERINEURAL at 10:03

## 2019-09-10 RX ADMIN — TRIAMCINOLONE ACETONIDE 40 MG: 40 INJECTION, SUSPENSION INTRA-ARTICULAR; INTRAMUSCULAR at 10:03

## 2019-09-10 NOTE — PROGRESS NOTES
Subjective:     Patient ID: Emmy Dietz is a 39 y.o. female.    Chief Complaint: follow-up right elbow pain    History of Present Illness  Emmy Dietz returns to clinic today for evaluation of right elbow. She states she did not experience much relief of her pain with the Medrol dosepak and the Meloxicam   Today, she rates the pain as 7/10 in severity, describes it as tender and aching in nature.    Localizes pain to the posterior aspect of her right elbow.    Her pain radiated down her arm to her wrist, denies associated numbness or tingling.    Social History     Occupational History     Employer: UNEMPLOYED   Tobacco Use   • Smoking status: Current Every Day Smoker     Packs/day: 1.00     Years: 10.00     Pack years: 10.00     Types: Cigarettes     Start date:    • Smokeless tobacco: Never Used   Substance and Sexual Activity   • Alcohol use: Yes     Comment: occassionaly   • Drug use: No   • Sexual activity: Defer      Past Medical History:   Diagnosis Date   • Anxiety    • Asthma    • Colon polyp    • Depression    • Endometriosis    • GERD (gastroesophageal reflux disease)    • Hard to intubate    • Hiatal hernia    • Hiatal hernia    • Hyperlipidemia    • Hypertension    • Injury of back    • Leg pain    • Leukocytosis, likely related to tobacco use    • Liver cyst    • Melanoma (CMS/HCC)     Superficial spreading lentiginous, right lower extremity   • Migraines    • PONV (postoperative nausea and vomiting)      Past Surgical History:   Procedure Laterality Date   • APPENDECTOMY     •  SECTION     • CHOLECYSTECTOMY     • COLONOSCOPY     • COLONOSCOPY N/A 2019    Procedure: COLONOSCOPY;  Surgeon: Jazmyn Cullen MD;  Location: [facility] OR;  Service: Gastroenterology   • ENDOSCOPY N/A 2018    Procedure: ESOPHAGOGASTRODUODENOSCOPY WITH BIOPSIES;  Surgeon: Jazmyn Cullen MD;  Location: [facility] OR;  Service: Gastroenterology   • HEMORRHOIDECTOMY     • HYSTERECTOMY     • HYSTEROSCOPY  ENDOMETRIAL ABLATION     • KNEE SURGERY     • OVARIAN CYST REMOVAL Left     Benign tumor   • OVARY SURGERY Left 2000   • PELVIC LAPAROSCOPY     • SKIN BIOPSY     • SKIN CANCER EXCISION Right     Leg melanoma   • TUBAL ABDOMINAL LIGATION         Family History   Problem Relation Age of Onset   • Cervical cancer Mother 26   • Skin cancer Mother 54        Basal cell   • Diabetes Mother    • Colon polyps Mother    • Kidney disease Mother    • Skin cancer Father 53        Non-melanoma   • Heart disease Father    • Hypertension Father    • Stroke Father    • Other Daughter         Enlarged spleen   • Migraines Daughter    • Brain cancer Other 60   • Pancreatic cancer Other    • Diabetes Other    • Heart disease Other    • Hypertension Other    • Colon cancer Maternal Uncle    • Breast cancer Maternal Grandmother    • Diabetes Maternal Grandmother    • Heart disease Paternal Grandmother    • Stroke Paternal Grandfather          Review of Systems   Constitutional: Negative for chills, diaphoresis, fever and unexpected weight change.   HENT: Negative for hearing loss, nosebleeds, sore throat and tinnitus.    Eyes: Negative for pain and visual disturbance.   Respiratory: Negative for cough, shortness of breath and wheezing.    Cardiovascular: Negative for chest pain and palpitations.   Gastrointestinal: Negative for abdominal pain, diarrhea, nausea and vomiting.   Endocrine: Negative for cold intolerance, heat intolerance and polydipsia.   Genitourinary: Negative for difficulty urinating, dysuria and hematuria.   Musculoskeletal: Positive for arthralgias and myalgias. Negative for joint swelling.   Skin: Negative for rash and wound.   Allergic/Immunologic: Negative for environmental allergies.   Neurological: Negative for dizziness, syncope and numbness.   Hematological: Does not bruise/bleed easily.   Psychiatric/Behavioral: Negative for dysphoric mood and sleep disturbance. The patient is not nervous/anxious.         "    Objective:  Vitals:    09/10/19 0920   Weight: 85.7 kg (189 lb)   Height: 167.6 cm (66\")         09/10/19  0920   Weight: 85.7 kg (189 lb)     Body mass index is 30.51 kg/m².  General: No acute distress.  Resp: normal respiratory effort  Skin: no rashes or wounds; normal turgor  Psych: mood and affect appropriate; recent and remote memory intact        Ortho Exam       Right Elbow-  Range of motion 0-155  4+/5 strength   Supination/pronation 90 with 4+/5 strength    Imaging:  None     Assessment:        1. Olecranon bursitis of right elbow           Plan:  Medium Joint Arthrocentesis: R elbow  Date/Time: 9/10/2019 10:03 AM  Consent given by: patient  Site marked: site marked  Timeout: Immediately prior to procedure a time out was called to verify the correct patient, procedure, equipment, support staff and site/side marked as required   Supporting Documentation  Indications: pain   Procedure Details  Location: elbow - R elbow  Preparation: Patient was prepped and draped in the usual sterile fashion  Needle size: 22 G  Approach: posterior  Medications administered: 2 mL lidocaine 1 %; 40 mg triamcinolone acetonide 40 MG/ML  Patient tolerance: patient tolerated the procedure well with no immediate complications                1. Discussed treatment options at length with patient at today's visit.   2. Patient would like to proceed with cortisone injection today to the right elbow. Recommended limited use of affected extremity for the next 24 hours to only essential activites other than work on general active and passive motion. Recommended supplementing with ice and soft tissue massage. Discussed with patient that they should see results in 5-7 days, if no improvement in 5-6 weeks I have asked them to call the office to review other options. Patient should call office immediately if they notice redness, warmth, fevers, chills, or residual numbness or tingling for greater than 6 hours after injection.       Emmy" Mirela was in agreement with plan and had all questions answered.     Orders:  Orders Placed This Encounter   Procedures   • Medium Joint Arthrocentesis: R elbow       Medications:  No orders of the defined types were placed in this encounter.      Followup:  Return in about 6 weeks (around 10/22/2019).    Emmy was seen today for follow-up and pain.    Diagnoses and all orders for this visit:    Olecranon bursitis of right elbow    Other orders  -     Cancel: Large Joint Arthrocentesis  -     Medium Joint Arthrocentesis: R elbow        By signing my name here, I Neema Lopez, attest that all documentation on 09/10/19 at 11:11 AM has been prepared under the direction and in the presence of Dr. Lele Garzon.    I, Dr. Lele Garzon, personally performed the services described in this documentation, as scribed by Neema Lopez, in my presence, and it is both accurate and complete.    Dictated utilizing Dragon dictation

## 2019-09-12 RX ORDER — DICYCLOMINE HCL 20 MG
TABLET ORAL
Qty: 45 TABLET | Refills: 1 | Status: SHIPPED | OUTPATIENT
Start: 2019-09-12 | End: 2019-11-06 | Stop reason: SDUPTHER

## 2019-09-18 ENCOUNTER — OFFICE VISIT (OUTPATIENT)
Dept: SURGERY | Facility: CLINIC | Age: 39
End: 2019-09-18

## 2019-09-18 VITALS
DIASTOLIC BLOOD PRESSURE: 94 MMHG | BODY MASS INDEX: 29.57 KG/M2 | HEART RATE: 72 BPM | RESPIRATION RATE: 16 BRPM | HEIGHT: 66 IN | WEIGHT: 184 LBS | SYSTOLIC BLOOD PRESSURE: 156 MMHG

## 2019-09-18 DIAGNOSIS — K21.9 GASTROESOPHAGEAL REFLUX DISEASE, ESOPHAGITIS PRESENCE NOT SPECIFIED: Primary | ICD-10-CM

## 2019-09-18 DIAGNOSIS — Z12.31 SCREENING MAMMOGRAM, ENCOUNTER FOR: Primary | ICD-10-CM

## 2019-09-18 DIAGNOSIS — R11.0 NAUSEA: ICD-10-CM

## 2019-09-18 DIAGNOSIS — R10.13 EPIGASTRIC PAIN: ICD-10-CM

## 2019-09-18 PROCEDURE — 99214 OFFICE O/P EST MOD 30 MIN: CPT | Performed by: SURGERY

## 2019-09-18 NOTE — PROGRESS NOTES
Emmy Dietz 39 y.o. female presents @ the req of SHANELLE Olmos, for eval of epi pain, N/V, diff swallowing, and occ vomits blood.   Chief Complaint   Patient presents with   • EGD             HPI   Above-noted and agree.  Emmy has been having issues with reflux.  She says that her food will come up and then go back down that she will occasionally vomit.  She was told she has scar tissue in her throat.  She also is epigastric pain.  She is on maximal therapy for this.  She has no fevers or chills.  She is still smoking.  She has a history of melanoma.  She also has a history of pituitary lesion that was followed by neurosurgery.  She is due for her next mammogram in November.      Review of Systems   All other systems reviewed and are negative.            Current Outpatient Medications:   •  Clotrimazole-Betameth & Zn Ox 1-0.05 & 20 % therapy, Apply  topically., Disp: , Rfl:   •  metoprolol tartrate (LOPRESSOR) 25 MG tablet, Take 25 mg by mouth 2 (Two) Times a Day., Disp: , Rfl:   •  mupirocin (BACTROBAN) 2 % ointment, Apply  topically to the appropriate area as directed 3 (Three) Times a Day., Disp: , Rfl:   •  amLODIPine (NORVASC) 5 MG tablet, Take 10 mg by mouth Daily., Disp: , Rfl:   •  atorvastatin (LIPITOR) 10 MG tablet, Take 10 mg by mouth Daily., Disp: , Rfl: 2  •  buPROPion XL (WELLBUTRIN XL) 300 MG 24 hr tablet, TAKE ONE (1) TABLET ORALLY (BY MOUTH) DAILY, Disp: , Rfl: 1  •  dicyclomine (BENTYL) 20 MG tablet, TAKE 1/2 TABLET BY MOUTH FOUR TIMES DAILY BEFORE MEALS AND AT BEDTIME, Disp: 45 tablet, Rfl: 1  •  loratadine (CLARITIN) 10 MG tablet, Take 10 mg by mouth Daily., Disp: , Rfl: 2  •  methylPREDNISolone (MEDROL, LISBETH,) 4 MG tablet, Take as directed on package instructions., Disp: 1 each, Rfl: 0  •  omeprazole (priLOSEC) 40 MG capsule, , Disp: , Rfl:   •  ondansetron ODT (ZOFRAN-ODT) 4 MG disintegrating tablet, dissolve one tablet orally EVERY 8 HOURS AS NEEDED FOR nausea, Disp: , Rfl: 0  •   polyethylene glycol (MIRALAX) packet, Take 17 g by mouth 2 (Two) Times a Day As Needed (constipation)., Disp: 20 each, Rfl: 0  •  sertraline (ZOLOFT) 100 MG tablet, Take 100 mg by mouth Daily., Disp: , Rfl:   •  sucralfate (CARAFATE) 1 g tablet, TAKE ONE TABLET BY MOUTH FOUR TIMES DAILY, Disp: 90 tablet, Rfl: 3  •  traZODone (DESYREL) 150 MG tablet, TAKE ONE (1) TABLET ORALLY (BY MOUTH) AT BEDTIME, Disp: , Rfl: 2  •  traZODone (DESYREL) 50 MG tablet, Take 50 mg by mouth every night at bedtime., Disp: , Rfl: 2  •  vitamin D (ERGOCALCIFEROL) 89272 units capsule capsule, TAKE ONE (1) CAPSULE ORALLY (BY MOUTH) EVERY WEEK, Disp: , Rfl: 1        Allergies   Allergen Reactions   • Bactrim  [Sulfamethoxazole-Trimethoprim]    • Erythromycin Unknown (See Comments)   • Hydrocodone    • Lisinopril    • Lyrica [Pregabalin]    • Meperidine Hives   • Morphine    • Niacin Hives   • Other Swelling     Erythromycin ophthalmic   • Tramadol Unknown (See Comments)     UNKNOWN           Past Medical History:   Diagnosis Date   • Anxiety    • Asthma    • Colon polyp    • Depression    • Endometriosis    • GERD (gastroesophageal reflux disease)    • Hard to intubate    • Hiatal hernia    • Hiatal hernia    • Hyperlipidemia    • Hypertension    • Injury of back    • Leg pain    • Leukocytosis, likely related to tobacco use    • Liver cyst    • Melanoma (CMS/HCC)     Superficial spreading lentiginous, right lower extremity   • Migraines    • PONV (postoperative nausea and vomiting)            Past Surgical History:   Procedure Laterality Date   • APPENDECTOMY     •  SECTION     • CHOLECYSTECTOMY     • COLONOSCOPY     • COLONOSCOPY N/A 2019    Procedure: COLONOSCOPY;  Surgeon: Jazmyn Cullen MD;  Location: [facility];  Service: Gastroenterology   • ENDOSCOPY N/A 2018    Procedure: ESOPHAGOGASTRODUODENOSCOPY WITH BIOPSIES;  Surgeon: Jazmyn Cullen MD;  Location: [facility] OR;  Service: Gastroenterology   • HEMORRHOIDECTOMY    "  • HYSTERECTOMY     • HYSTEROSCOPY ENDOMETRIAL ABLATION     • KNEE SURGERY     • OVARIAN CYST REMOVAL Left     Benign tumor   • OVARY SURGERY Left 2000   • PELVIC LAPAROSCOPY     • SKIN BIOPSY     • SKIN CANCER EXCISION Right     Leg melanoma   • TUBAL ABDOMINAL LIGATION             Social History     Tobacco Use   • Smoking status: Current Every Day Smoker     Packs/day: 1.00     Years: 10.00     Pack years: 10.00     Types: Cigarettes     Start date: 1997   • Smokeless tobacco: Never Used   Substance Use Topics   • Alcohol use: Yes     Comment: occassionaly   • Drug use: No             There is no immunization history on file for this patient.        Physical Exam   Constitutional: She is oriented to person, place, and time. She appears well-developed and well-nourished.   HENT:   Head: Normocephalic and atraumatic.   Right Ear: External ear normal.   Left Ear: External ear normal.   Cardiovascular: Normal rate and regular rhythm.   Pulmonary/Chest: Effort normal and breath sounds normal.   Abdominal: Soft. Bowel sounds are normal.   Musculoskeletal: She exhibits no edema or deformity.   Neurological: She is alert and oriented to person, place, and time.   Skin: Skin is warm and dry.   Psychiatric: She has a normal mood and affect. Her behavior is normal.   Nursing note and vitals reviewed.      Debilities/Disabilities Identified: None    Emotional Behavior: Appropriate      /94   Pulse 72   Resp 16   Ht 167.6 cm (66\")   Wt 83.5 kg (184 lb)   BMI 29.70 kg/m²         Emmy was seen today for egd.    Diagnoses and all orders for this visit:    Gastroesophageal reflux disease, esophagitis presence not specified    Epigastric pain    Nausea    We will get Emmy scheduled for an EGD.  I discussed with the patient the benefits and risks of performing endoscopy.  Benefits and risks were not limited to but including bleeding, infection, perforation, complications of anesthesia, aspiration.  The patient " appeared to understand and is willing to proceed.    Thank you for allowing me to participate in the care of this interesting patient.

## 2019-09-18 NOTE — H&P
Emmy Dietz 39 y.o. female presents @ the req of SHANELLE Olmos, for eval of epi pain, N/V, diff swallowing, and occ vomits blood.   Chief Complaint   Patient presents with   • EGD             HPI   Above-noted and agree.  Emmy has been having issues with reflux.  She says that her food will come up and then go back down that she will occasionally vomit.  She was told she has scar tissue in her throat.  She also is epigastric pain.  She is on maximal therapy for this.  She has no fevers or chills.  She is still smoking.  She has a history of melanoma.  She also has a history of pituitary lesion that was followed by neurosurgery.  She is due for her next mammogram in November.      Review of Systems   All other systems reviewed and are negative.            Current Outpatient Medications:   •  Clotrimazole-Betameth & Zn Ox 1-0.05 & 20 % therapy, Apply  topically., Disp: , Rfl:   •  metoprolol tartrate (LOPRESSOR) 25 MG tablet, Take 25 mg by mouth 2 (Two) Times a Day., Disp: , Rfl:   •  mupirocin (BACTROBAN) 2 % ointment, Apply  topically to the appropriate area as directed 3 (Three) Times a Day., Disp: , Rfl:   •  amLODIPine (NORVASC) 5 MG tablet, Take 10 mg by mouth Daily., Disp: , Rfl:   •  atorvastatin (LIPITOR) 10 MG tablet, Take 10 mg by mouth Daily., Disp: , Rfl: 2  •  buPROPion XL (WELLBUTRIN XL) 300 MG 24 hr tablet, TAKE ONE (1) TABLET ORALLY (BY MOUTH) DAILY, Disp: , Rfl: 1  •  dicyclomine (BENTYL) 20 MG tablet, TAKE 1/2 TABLET BY MOUTH FOUR TIMES DAILY BEFORE MEALS AND AT BEDTIME, Disp: 45 tablet, Rfl: 1  •  loratadine (CLARITIN) 10 MG tablet, Take 10 mg by mouth Daily., Disp: , Rfl: 2  •  methylPREDNISolone (MEDROL, LISBETH,) 4 MG tablet, Take as directed on package instructions., Disp: 1 each, Rfl: 0  •  omeprazole (priLOSEC) 40 MG capsule, , Disp: , Rfl:   •  ondansetron ODT (ZOFRAN-ODT) 4 MG disintegrating tablet, dissolve one tablet orally EVERY 8 HOURS AS NEEDED FOR nausea, Disp: , Rfl: 0  •   polyethylene glycol (MIRALAX) packet, Take 17 g by mouth 2 (Two) Times a Day As Needed (constipation)., Disp: 20 each, Rfl: 0  •  sertraline (ZOLOFT) 100 MG tablet, Take 100 mg by mouth Daily., Disp: , Rfl:   •  sucralfate (CARAFATE) 1 g tablet, TAKE ONE TABLET BY MOUTH FOUR TIMES DAILY, Disp: 90 tablet, Rfl: 3  •  traZODone (DESYREL) 150 MG tablet, TAKE ONE (1) TABLET ORALLY (BY MOUTH) AT BEDTIME, Disp: , Rfl: 2  •  traZODone (DESYREL) 50 MG tablet, Take 50 mg by mouth every night at bedtime., Disp: , Rfl: 2  •  vitamin D (ERGOCALCIFEROL) 98185 units capsule capsule, TAKE ONE (1) CAPSULE ORALLY (BY MOUTH) EVERY WEEK, Disp: , Rfl: 1        Allergies   Allergen Reactions   • Bactrim  [Sulfamethoxazole-Trimethoprim]    • Erythromycin Unknown (See Comments)   • Hydrocodone    • Lisinopril    • Lyrica [Pregabalin]    • Meperidine Hives   • Morphine    • Niacin Hives   • Other Swelling     Erythromycin ophthalmic   • Tramadol Unknown (See Comments)     UNKNOWN           Past Medical History:   Diagnosis Date   • Anxiety    • Asthma    • Colon polyp    • Depression    • Endometriosis    • GERD (gastroesophageal reflux disease)    • Hard to intubate    • Hiatal hernia    • Hiatal hernia    • Hyperlipidemia    • Hypertension    • Injury of back    • Leg pain    • Leukocytosis, likely related to tobacco use    • Liver cyst    • Melanoma (CMS/HCC)     Superficial spreading lentiginous, right lower extremity   • Migraines    • PONV (postoperative nausea and vomiting)            Past Surgical History:   Procedure Laterality Date   • APPENDECTOMY     •  SECTION     • CHOLECYSTECTOMY     • COLONOSCOPY     • COLONOSCOPY N/A 2019    Procedure: COLONOSCOPY;  Surgeon: Jazmyn Cullen MD;  Location: [facility] OR;  Service: Gastroenterology   • ENDOSCOPY N/A 2018    Procedure: ESOPHAGOGASTRODUODENOSCOPY WITH BIOPSIES;  Surgeon: Jazmyn Cullen MD;  Location: [facility] OR;  Service: Gastroenterology   • HEMORRHOIDECTOMY    "  • HYSTERECTOMY     • HYSTEROSCOPY ENDOMETRIAL ABLATION     • KNEE SURGERY     • OVARIAN CYST REMOVAL Left     Benign tumor   • OVARY SURGERY Left 2000   • PELVIC LAPAROSCOPY     • SKIN BIOPSY     • SKIN CANCER EXCISION Right     Leg melanoma   • TUBAL ABDOMINAL LIGATION             Social History     Tobacco Use   • Smoking status: Current Every Day Smoker     Packs/day: 1.00     Years: 10.00     Pack years: 10.00     Types: Cigarettes     Start date: 1997   • Smokeless tobacco: Never Used   Substance Use Topics   • Alcohol use: Yes     Comment: occassionaly   • Drug use: No             There is no immunization history on file for this patient.        Physical Exam   Constitutional: She is oriented to person, place, and time. She appears well-developed and well-nourished.   HENT:   Head: Normocephalic and atraumatic.   Right Ear: External ear normal.   Left Ear: External ear normal.   Cardiovascular: Normal rate and regular rhythm.   Pulmonary/Chest: Effort normal and breath sounds normal.   Abdominal: Soft. Bowel sounds are normal.   Musculoskeletal: She exhibits no edema or deformity.   Neurological: She is alert and oriented to person, place, and time.   Skin: Skin is warm and dry.   Psychiatric: She has a normal mood and affect. Her behavior is normal.   Nursing note and vitals reviewed.      Debilities/Disabilities Identified: None    Emotional Behavior: Appropriate      /94   Pulse 72   Resp 16   Ht 167.6 cm (66\")   Wt 83.5 kg (184 lb)   BMI 29.70 kg/m²          Emmy was seen today for egd.    Diagnoses and all orders for this visit:    Gastroesophageal reflux disease, esophagitis presence not specified    Epigastric pain    Nausea    We will get Emmy scheduled for an EGD.  I discussed with the patient the benefits and risks of performing endoscopy.  Benefits and risks were not limited to but including bleeding, infection, perforation, complications of anesthesia, aspiration.  The patient " appeared to understand and is willing to proceed.    Thank you for allowing me to participate in the care of this interesting patient.

## 2019-09-19 ENCOUNTER — LAB (OUTPATIENT)
Dept: LAB | Facility: HOSPITAL | Age: 39
End: 2019-09-19

## 2019-09-19 ENCOUNTER — OFFICE VISIT (OUTPATIENT)
Dept: NEUROLOGY | Facility: CLINIC | Age: 39
End: 2019-09-19

## 2019-09-19 VITALS
DIASTOLIC BLOOD PRESSURE: 76 MMHG | SYSTOLIC BLOOD PRESSURE: 130 MMHG | HEIGHT: 66 IN | BODY MASS INDEX: 30.86 KG/M2 | HEART RATE: 80 BPM | WEIGHT: 192 LBS | OXYGEN SATURATION: 98 %

## 2019-09-19 DIAGNOSIS — G89.29 CHRONIC NONINTRACTABLE HEADACHE, UNSPECIFIED HEADACHE TYPE: Primary | ICD-10-CM

## 2019-09-19 DIAGNOSIS — G89.29 CHRONIC NONINTRACTABLE HEADACHE, UNSPECIFIED HEADACHE TYPE: ICD-10-CM

## 2019-09-19 DIAGNOSIS — R51.9 CHRONIC NONINTRACTABLE HEADACHE, UNSPECIFIED HEADACHE TYPE: Primary | ICD-10-CM

## 2019-09-19 DIAGNOSIS — R51.9 CHRONIC NONINTRACTABLE HEADACHE, UNSPECIFIED HEADACHE TYPE: ICD-10-CM

## 2019-09-19 LAB — PROLACTIN SERPL-MCNC: 12.3 NG/ML (ref 4.79–23.3)

## 2019-09-19 PROCEDURE — 36415 COLL VENOUS BLD VENIPUNCTURE: CPT

## 2019-09-19 PROCEDURE — 84146 ASSAY OF PROLACTIN: CPT

## 2019-09-19 PROCEDURE — 99214 OFFICE O/P EST MOD 30 MIN: CPT | Performed by: PSYCHIATRY & NEUROLOGY

## 2019-09-19 RX ORDER — CLOTRIMAZOLE AND BETAMETHASONE DIPROPIONATE 10; .5 MG/ML; MG/ML
LOTION TOPICAL
Refills: 5 | COMMUNITY
Start: 2019-09-09 | End: 2022-10-26

## 2019-09-19 RX ORDER — METOPROLOL SUCCINATE 25 MG/1
25 TABLET, EXTENDED RELEASE ORAL DAILY
Refills: 2 | COMMUNITY
Start: 2019-09-09

## 2019-09-19 RX ORDER — TOPIRAMATE 25 MG/1
25 TABLET ORAL NIGHTLY
Qty: 60 TABLET | Refills: 0 | Status: SHIPPED | OUTPATIENT
Start: 2019-09-19 | End: 2020-09-18

## 2019-09-19 RX ORDER — OMEPRAZOLE 20 MG/1
20 CAPSULE, DELAYED RELEASE ORAL 2 TIMES DAILY
Refills: 2 | COMMUNITY
Start: 2019-08-01 | End: 2020-08-05

## 2019-09-30 ENCOUNTER — ANESTHESIA EVENT (OUTPATIENT)
Dept: PERIOP | Facility: HOSPITAL | Age: 39
End: 2019-09-30

## 2019-10-01 ENCOUNTER — HOSPITAL ENCOUNTER (OUTPATIENT)
Facility: HOSPITAL | Age: 39
Setting detail: HOSPITAL OUTPATIENT SURGERY
Discharge: HOME | End: 2019-10-01
Attending: SURGERY | Admitting: SURGERY

## 2019-10-01 ENCOUNTER — ANESTHESIA (OUTPATIENT)
Dept: PERIOP | Facility: HOSPITAL | Age: 39
End: 2019-10-01

## 2019-10-01 VITALS
HEART RATE: 62 BPM | TEMPERATURE: 98 F | RESPIRATION RATE: 16 BRPM | BODY MASS INDEX: 30.22 KG/M2 | OXYGEN SATURATION: 99 % | HEIGHT: 66 IN | WEIGHT: 188 LBS | SYSTOLIC BLOOD PRESSURE: 109 MMHG | DIASTOLIC BLOOD PRESSURE: 77 MMHG

## 2019-10-01 DIAGNOSIS — K21.9 GASTROESOPHAGEAL REFLUX DISEASE, ESOPHAGITIS PRESENCE NOT SPECIFIED: ICD-10-CM

## 2019-10-01 DIAGNOSIS — R10.13 EPIGASTRIC PAIN: ICD-10-CM

## 2019-10-01 DIAGNOSIS — R11.0 NAUSEA: ICD-10-CM

## 2019-10-01 PROCEDURE — 43239 EGD BIOPSY SINGLE/MULTIPLE: CPT | Performed by: SURGERY

## 2019-10-01 PROCEDURE — 25010000002 PROPOFOL 10 MG/ML EMULSION: Performed by: NURSE ANESTHETIST, CERTIFIED REGISTERED

## 2019-10-01 PROCEDURE — 87081 CULTURE SCREEN ONLY: CPT | Performed by: SURGERY

## 2019-10-01 RX ORDER — SODIUM CHLORIDE 0.9 % (FLUSH) 0.9 %
1-10 SYRINGE (ML) INJECTION AS NEEDED
Status: DISCONTINUED | OUTPATIENT
Start: 2019-10-01 | End: 2019-10-01 | Stop reason: HOSPADM

## 2019-10-01 RX ORDER — SODIUM CHLORIDE, SODIUM LACTATE, POTASSIUM CHLORIDE, CALCIUM CHLORIDE 600; 310; 30; 20 MG/100ML; MG/100ML; MG/100ML; MG/100ML
9 INJECTION, SOLUTION INTRAVENOUS CONTINUOUS
Status: DISCONTINUED | OUTPATIENT
Start: 2019-10-01 | End: 2019-10-01 | Stop reason: HOSPADM

## 2019-10-01 RX ORDER — ONDANSETRON 2 MG/ML
4 INJECTION INTRAMUSCULAR; INTRAVENOUS ONCE AS NEEDED
Status: DISCONTINUED | OUTPATIENT
Start: 2019-10-01 | End: 2019-10-01 | Stop reason: HOSPADM

## 2019-10-01 RX ORDER — SODIUM CHLORIDE 0.9 % (FLUSH) 0.9 %
3 SYRINGE (ML) INJECTION EVERY 12 HOURS SCHEDULED
Status: DISCONTINUED | OUTPATIENT
Start: 2019-10-01 | End: 2019-10-01 | Stop reason: HOSPADM

## 2019-10-01 RX ORDER — SODIUM CHLORIDE, SODIUM LACTATE, POTASSIUM CHLORIDE, CALCIUM CHLORIDE 600; 310; 30; 20 MG/100ML; MG/100ML; MG/100ML; MG/100ML
100 INJECTION, SOLUTION INTRAVENOUS CONTINUOUS
Status: DISCONTINUED | OUTPATIENT
Start: 2019-10-01 | End: 2019-10-01 | Stop reason: HOSPADM

## 2019-10-01 RX ORDER — SODIUM CHLORIDE 9 MG/ML
40 INJECTION, SOLUTION INTRAVENOUS AS NEEDED
Status: DISCONTINUED | OUTPATIENT
Start: 2019-10-01 | End: 2019-10-01 | Stop reason: HOSPADM

## 2019-10-01 RX ORDER — PROPOFOL 10 MG/ML
VIAL (ML) INTRAVENOUS AS NEEDED
Status: DISCONTINUED | OUTPATIENT
Start: 2019-10-01 | End: 2019-10-01 | Stop reason: SURG

## 2019-10-01 RX ORDER — LIDOCAINE HYDROCHLORIDE 20 MG/ML
INJECTION, SOLUTION INFILTRATION; PERINEURAL AS NEEDED
Status: DISCONTINUED | OUTPATIENT
Start: 2019-10-01 | End: 2019-10-01 | Stop reason: SURG

## 2019-10-01 RX ORDER — LIDOCAINE HYDROCHLORIDE 10 MG/ML
0.5 INJECTION, SOLUTION EPIDURAL; INFILTRATION; INTRACAUDAL; PERINEURAL ONCE AS NEEDED
Status: COMPLETED | OUTPATIENT
Start: 2019-10-01 | End: 2019-10-01

## 2019-10-01 RX ADMIN — LIDOCAINE HYDROCHLORIDE 0.5 ML: 10 INJECTION, SOLUTION EPIDURAL; INFILTRATION; INTRACAUDAL; PERINEURAL at 07:50

## 2019-10-01 RX ADMIN — SODIUM CHLORIDE, POTASSIUM CHLORIDE, SODIUM LACTATE AND CALCIUM CHLORIDE 9 ML/HR: 600; 310; 30; 20 INJECTION, SOLUTION INTRAVENOUS at 07:50

## 2019-10-01 RX ADMIN — LIDOCAINE HYDROCHLORIDE 100 MG: 20 INJECTION, SOLUTION INFILTRATION; PERINEURAL at 08:11

## 2019-10-01 RX ADMIN — PROPOFOL 40 MG: 10 INJECTION, EMULSION INTRAVENOUS at 08:15

## 2019-10-01 RX ADMIN — PROPOFOL 40 MG: 10 INJECTION, EMULSION INTRAVENOUS at 08:13

## 2019-10-01 NOTE — ANESTHESIA PREPROCEDURE EVALUATION
Anesthesia Evaluation     Patient summary reviewed and Nursing notes reviewed   history of anesthetic complications (difficult intubation due to narcotic reaction and throat swelling, per pt): PONV difficult airway prolonged sedation  NPO Solid Status: > 8 hours  NPO Liquid Status: > 8 hours           Airway   Mallampati: II  TM distance: >3 FB  Neck ROM: full  No difficulty expected  Dental - normal exam     Pulmonary - normal exam    breath sounds clear to auscultation  (+) a smoker (1/2 ppd x 20 years) Current Smoked day of surgery, asthma (last inhaler 5 mo ago),   Cardiovascular - normal exam  Exercise tolerance: good (4-7 METS)    Patient on routine beta blocker  Rhythm: regular  Rate: normal    (+) hypertension well controlled 2 medications or greater, hyperlipidemia,       Neuro/Psych  (+) headaches, dizziness/light headedness (vertigo), numbness (R leg pain), psychiatric history Anxiety and Depression,     GI/Hepatic/Renal/Endo    (+)  hiatal hernia, GERD well controlled, GI bleeding, liver disease (cyst),     Musculoskeletal     (+) back pain, chronic pain,   Abdominal  - normal exam   Substance History - negative use     OB/GYN          Other      history of cancer (Melanoma) active    ROS/Med Hx Other: ECG 12 Lead   Order: [id number]   Status:  Final result   Visible to patient:  Yes (MyChart) Next appt:  10/09/2019 at 10:00 AM in General Surgery (Martha Hawkins DO)     Narrative       RR Interval= 857 ms  NV Interval= 148 ms  QRSD Interval= 96 ms  QT Interval= 424 ms  QTc Interval= 458 ms  Heart Rate= 70 ms  P Axis= 43 deg  QRS Axis= 76 deg  T Wave Axis= 19 deg  I: 40 Axis= 15 deg  T: 40 Axis= 103 deg  ST Axis= 7 deg  SINUS RHYTHM  INFERIOR Q WAVES, PROBABLY NORMAL VARIATION  NO SIGNIFICANT CHANGE FROM PREVIOUS ECG  Electronically Signed by:  Abilio Mcarthur ([facility]) 06-Sep-2017 16:28:14  Date and Time of Study: 2017-09-05 22:39:59                            Anesthesia Plan    ASA 3     MAC      intravenous induction   Anesthetic plan, all risks, benefits, and alternatives have been provided, discussed and informed consent has been obtained with: patient.  Use of blood products discussed with patient  Consented to blood products.   Plan discussed with CRNA.

## 2019-10-01 NOTE — ANESTHESIA POSTPROCEDURE EVALUATION
Patient: Emmy Dietz    Procedure Summary     Date:  10/01/19 Room / Location:  [facility] ENDOSCOPY 1 /  [facility] OR    Anesthesia Start:  0809 Anesthesia Stop:  0820    Procedure:  ESOPHAGOGASTRODUODENOSCOPY with biopsy (N/A Esophagus) Diagnosis:       Gastroesophageal reflux disease, esophagitis presence not specified      Epigastric pain      Nausea      (Gastroesophageal reflux disease, esophagitis presence not specified [K21.9])      (Epigastric pain [R10.13])      (Nausea [R11.0])    Surgeon:  Martha Hawkins DO Provider:  Clemencia Jones CRNA    Anesthesia Type:  MAC ASA Status:  3          Anesthesia Type: MAC  Last vitals  BP   103/63 (10/01/19 0844)   Temp   98 °F (36.7 °C) (10/01/19 0728)   Pulse   60 (10/01/19 0844)   Resp   14 (10/01/19 0844)     SpO2   99 % (10/01/19 0844)     Post Anesthesia Care and Evaluation    Patient location during evaluation: PHASE II  Patient participation: complete - patient participated  Level of consciousness: awake and alert  Pain score: 0  Pain management: adequate  Airway patency: patent  Anesthetic complications: No anesthetic complications  PONV Status: none  Cardiovascular status: acceptable  Respiratory status: acceptable  Hydration status: acceptable

## 2019-10-01 NOTE — INTERVAL H&P NOTE
"  H&P reviewed. The patient was examined and there are no changes to the H&P.       /77 (BP Location: Left arm, Patient Position: Lying)   Pulse 62   Temp 98 °F (36.7 °C) (Oral)   Resp 16   Ht 167.6 cm (66\")   Wt 85.3 kg (188 lb)   SpO2 97%   BMI 30.34 kg/m²         "

## 2019-10-01 NOTE — OP NOTE
EGD Procedure Note    Pre-operative Diagnosis:  Gastroesophageal reflux disease, esophagitis presence not specified [K21.9]  Epigastric pain [R10.13]  Nausea [R11.0]    Post-operative Diagnosis: gastritis, duodenitis     Procedure:  EGD with biopsy with cold forceps    Surgeon: Ross    Estimated Blood Loss:  Minimal     Complications:  none    Anesthetic: MAC    Indications:  See preoperative diagnosis     Findings/Treatments:    Gastritis and duodenitis--biopsy for H. Pylori with cold forceps     Recommendations:  -Await PAULA test result and treat for Helicobacter pylori if positive.      Technique:    After discussing the benefits and risks of an EGD, benefits and risks not limited to but including:  Bleeding, infection, perforation, aspiration; informed consent was signed.  The patient was taken into the endoscopy suite at [facility] and placed in the left lateral decubitus position.  MAC anesthesia was induced under appropriate monitoring.  Bite block was placed and the gastroscope was inserted thru such and advanced under direct vision to second portion of the duodenum.  A careful inspection was made as the gastroscope was withdrawn, including a retroflexed view of the proximal stomach; findings and interventions are described below.  If biopsies were taken, this was done with the cold biopsy forceps.    Martha Hawkins D.O.

## 2019-10-02 LAB — UREASE TISS QL: POSITIVE

## 2019-10-02 NOTE — PROGRESS NOTES
Please call in the appropriate antibiotics and have her increase the prilosec and hold the carafate

## 2019-10-09 ENCOUNTER — OFFICE VISIT (OUTPATIENT)
Dept: SURGERY | Facility: CLINIC | Age: 39
End: 2019-10-09

## 2019-10-09 DIAGNOSIS — K27.9 PUD (PEPTIC ULCER DISEASE): ICD-10-CM

## 2019-10-09 DIAGNOSIS — K29.80 H. PYLORI DUODENITIS: Primary | ICD-10-CM

## 2019-10-09 DIAGNOSIS — B96.81 H. PYLORI DUODENITIS: Primary | ICD-10-CM

## 2019-10-09 PROCEDURE — 99213 OFFICE O/P EST LOW 20 MIN: CPT | Performed by: SURGERY

## 2019-10-09 NOTE — PROGRESS NOTES
"Emmy Dietz 39 y.o. female presents for PO FU EGD/+HPylori.  Pt repots she is taking Rx as directed.  Pt reports she has been treated \"at least 4 other  Times for HPylori.  Pt reports the abd pain/nausea continues.       HPI   Above noted and agree.  Emmy had gastritis and duodenitis.  She is taking the antibiotics.  She is still smoking.  She still has pain and nausea.  She has no chest pain or shortness of breath.  She was taking Mobic for her elbow but it didn't work so she stopped taking it.      Review of Systems        Past Medical History:   Diagnosis Date   • Anxiety    • Asthma    • Colon polyp    • Depression    • Endometriosis    • GERD (gastroesophageal reflux disease)    • Hard to intubate    • Hiatal hernia    • Hiatal hernia    • Hyperlipidemia    • Hypertension    • Injury of back    • Leg pain    • Leukocytosis, likely related to tobacco use    • Liver cyst    • Melanoma (CMS/HCC)     Superficial spreading lentiginous, right lower extremity   • Migraines    • PONV (postoperative nausea and vomiting)            Past Surgical History:   Procedure Laterality Date   • APPENDECTOMY     •  SECTION     • CHOLECYSTECTOMY     • COLONOSCOPY     • COLONOSCOPY N/A 2019    Procedure: COLONOSCOPY;  Surgeon: Jazmyn Cullen MD;  Location: [facility] OR;  Service: Gastroenterology   • ENDOSCOPY N/A 2018    Procedure: ESOPHAGOGASTRODUODENOSCOPY WITH BIOPSIES;  Surgeon: Jazmyn Cullen MD;  Location: [facility] OR;  Service: Gastroenterology   • ENDOSCOPY N/A 10/1/2019    Procedure: ESOPHAGOGASTRODUODENOSCOPY with biopsy;  Surgeon: Martha Hawkins DO;  Location: [facility] OR;  Service: Gastroenterology   • HEMORRHOIDECTOMY     • HYSTERECTOMY      partial   • HYSTEROSCOPY ENDOMETRIAL ABLATION     • KNEE SURGERY     • OVARIAN CYST REMOVAL Left     Benign tumor   • OVARY SURGERY Left    • PELVIC LAPAROSCOPY     • SKIN BIOPSY     • SKIN CANCER EXCISION Right     Leg melanoma   • TUBAL ABDOMINAL " LIGATION             Physical Exam   Constitutional: She is oriented to person, place, and time. She appears well-developed and well-nourished.   HENT:   Head: Normocephalic and atraumatic.   Right Ear: External ear normal.   Left Ear: External ear normal.   Cardiovascular: Normal rate and regular rhythm.   Pulmonary/Chest: Effort normal and breath sounds normal.   Abdominal: Soft. Bowel sounds are normal.   Musculoskeletal: She exhibits no edema or deformity.   Neurological: She is alert and oriented to person, place, and time.   Skin: Skin is warm and dry.   Psychiatric: She has a normal mood and affect. Her behavior is normal.           There were no vitals taken for this visit.        Emmy was seen today for follow-up.    Diagnoses and all orders for this visit:    H. pylori duodenitis    PUD (peptic ulcer disease)    I gave Emmy a PUD diet sheet and an H. Pylori information sheet.  I strongly encouraged her to stop smoking.  She may return to the clinic anytime as needed.    Thank you for allowing me to participate in the care of this interesting patient.

## 2019-10-22 ENCOUNTER — APPOINTMENT (OUTPATIENT)
Dept: MAMMOGRAPHY | Facility: HOSPITAL | Age: 39
End: 2019-10-22

## 2019-11-08 RX ORDER — DICYCLOMINE HCL 20 MG
TABLET ORAL
Qty: 45 TABLET | Refills: 1 | Status: SHIPPED | OUTPATIENT
Start: 2019-11-08 | End: 2020-01-08

## 2019-11-22 ENCOUNTER — HOSPITAL ENCOUNTER (OUTPATIENT)
Dept: MAMMOGRAPHY | Facility: HOSPITAL | Age: 39
Discharge: HOME | End: 2019-11-22
Admitting: SURGERY

## 2019-11-22 DIAGNOSIS — Z12.31 SCREENING MAMMOGRAM, ENCOUNTER FOR: ICD-10-CM

## 2019-11-22 PROCEDURE — 77067 SCR MAMMO BI INCL CAD: CPT

## 2019-11-22 PROCEDURE — 77063 BREAST TOMOSYNTHESIS BI: CPT

## 2019-11-25 DIAGNOSIS — N60.01 BREAST CYST, RIGHT: Primary | ICD-10-CM

## 2019-12-02 ENCOUNTER — HOSPITAL ENCOUNTER (OUTPATIENT)
Dept: ULTRASOUND IMAGING | Facility: HOSPITAL | Age: 39
Discharge: HOME | End: 2019-12-02
Admitting: SURGERY

## 2019-12-02 DIAGNOSIS — N60.01 BREAST CYST, RIGHT: ICD-10-CM

## 2019-12-02 PROCEDURE — 76641 ULTRASOUND BREAST COMPLETE: CPT

## 2019-12-03 DIAGNOSIS — N63.10 BREAST MASS, RIGHT: Primary | ICD-10-CM

## 2019-12-11 ENCOUNTER — HOSPITAL ENCOUNTER (OUTPATIENT)
Dept: MAMMOGRAPHY | Facility: HOSPITAL | Age: 39
Discharge: HOME | End: 2019-12-11

## 2019-12-11 ENCOUNTER — HOSPITAL ENCOUNTER (OUTPATIENT)
Dept: ULTRASOUND IMAGING | Facility: HOSPITAL | Age: 39
Discharge: HOME | End: 2019-12-11
Admitting: SURGERY

## 2019-12-11 DIAGNOSIS — IMO0002 MASS: ICD-10-CM

## 2019-12-11 DIAGNOSIS — N63.10 BREAST MASS, RIGHT: ICD-10-CM

## 2019-12-11 PROCEDURE — 88305 TISSUE EXAM BY PATHOLOGIST: CPT | Performed by: SURGERY

## 2019-12-11 PROCEDURE — 25010000003 LIDOCAINE 1 % SOLUTION: Performed by: SURGERY

## 2019-12-11 RX ORDER — LIDOCAINE HYDROCHLORIDE 10 MG/ML
5 INJECTION, SOLUTION INFILTRATION; PERINEURAL ONCE
Status: COMPLETED | OUTPATIENT
Start: 2019-12-11 | End: 2019-12-11

## 2019-12-11 RX ADMIN — LIDOCAINE HYDROCHLORIDE 5 ML: 10 INJECTION, SOLUTION INFILTRATION; PERINEURAL at 12:10

## 2019-12-12 LAB
CYTO UR: NORMAL
LAB AP CASE REPORT: NORMAL
LAB AP CLINICAL INFORMATION: NORMAL
LAB AP DIAGNOSIS COMMENT: NORMAL
PATH REPORT.FINAL DX SPEC: NORMAL
PATH REPORT.GROSS SPEC: NORMAL

## 2019-12-16 RX ORDER — BISMUTH SUBSALICYLATE 262 MG/1
TABLET ORAL
OUTPATIENT
Start: 2019-12-16

## 2019-12-16 RX ORDER — DOXYCYCLINE 100 MG/1
CAPSULE ORAL
Qty: 28 CAPSULE | OUTPATIENT
Start: 2019-12-16

## 2019-12-16 RX ORDER — METRONIDAZOLE 500 MG/1
TABLET ORAL
Qty: 28 TABLET | OUTPATIENT
Start: 2019-12-16

## 2019-12-20 RX ORDER — SUCRALFATE 1 G/1
TABLET ORAL
Qty: 90 TABLET | Refills: 3 | Status: SHIPPED | OUTPATIENT
Start: 2019-12-20 | End: 2020-11-19 | Stop reason: SDUPTHER

## 2019-12-23 ENCOUNTER — OFFICE VISIT (OUTPATIENT)
Dept: SURGERY | Facility: CLINIC | Age: 39
End: 2019-12-23

## 2019-12-23 VITALS
BODY MASS INDEX: 29.25 KG/M2 | HEIGHT: 66 IN | SYSTOLIC BLOOD PRESSURE: 128 MMHG | DIASTOLIC BLOOD PRESSURE: 72 MMHG | RESPIRATION RATE: 18 BRPM | WEIGHT: 182 LBS

## 2019-12-23 DIAGNOSIS — D24.9 FIBROADENOMA OF BREAST, UNSPECIFIED LATERALITY: Primary | ICD-10-CM

## 2019-12-23 DIAGNOSIS — Z72.0 TOBACCO ABUSE: ICD-10-CM

## 2019-12-23 PROCEDURE — 99213 OFFICE O/P EST LOW 20 MIN: CPT | Performed by: SURGERY

## 2019-12-23 NOTE — PROGRESS NOTES
Emmy Dietz 39 y.o. female presents @ for eval of   Chief Complaint   Patient presents with   • Follow-up     FU for right breast biopsy        HPI     Above noted and agree.  Emmy has a history of cancer.  She had an abnormal mammogram and had a biopsy performed under ultrasound guidance.  She is doing well.  She is here to discuss the results.  She is still smoking.  She has already seen the genetic counselor.  She says her breast has some bruising but is otherwise fine and her incision is well healed.    Review of Systems          Current Outpatient Medications:   •  amLODIPine (NORVASC) 5 MG tablet, Take 10 mg by mouth Daily., Disp: , Rfl:   •  atorvastatin (LIPITOR) 10 MG tablet, Take 10 mg by mouth Daily., Disp: , Rfl: 2  •  buPROPion XL (WELLBUTRIN XL) 300 MG 24 hr tablet, TAKE ONE (1) TABLET ORALLY (BY MOUTH) DAILY, Disp: , Rfl: 1  •  Clotrimazole-Betameth & Zn Ox 1-0.05 & 20 % therapy, Apply  topically., Disp: , Rfl:   •  clotrimazole-betamethasone (LOTRISONE) 1-0.05 % lotion, PLACE FOUR TO FIVE DROPS OUTSIDE THE LEFT EAR TWICE DAILY FOR ONE WEEK THEN CONTINUE FOR 2-3 TIMES A WEEK, Disp: , Rfl: 5  •  dicyclomine (BENTYL) 20 MG tablet, TAKE 1/2 TABLET BY MOUTH FOUR TIMES DAILY BEFORE MEALS & AT BEDTIME, Disp: 45 tablet, Rfl: 1  •  loratadine (CLARITIN) 10 MG tablet, Take 10 mg by mouth Daily., Disp: , Rfl: 2  •  metoprolol succinate XL (TOPROL-XL) 25 MG 24 hr tablet, Take 25 mg by mouth Daily., Disp: , Rfl: 2  •  mupirocin (BACTROBAN) 2 % ointment, Apply  topically to the appropriate area as directed 3 (Three) Times a Day., Disp: , Rfl:   •  omeprazole (priLOSEC) 20 MG capsule, Take 20 mg by mouth 2 (Two) Times a Day., Disp: , Rfl: 2  •  ondansetron ODT (ZOFRAN-ODT) 4 MG disintegrating tablet, dissolve one tablet orally EVERY 8 HOURS AS NEEDED FOR nausea, Disp: , Rfl: 0  •  polyethylene glycol (MIRALAX) packet, Take 17 g by mouth 2 (Two) Times a Day As Needed (constipation)., Disp: 20 each, Rfl: 0  •   sertraline (ZOLOFT) 100 MG tablet, Take 100 mg by mouth Daily., Disp: , Rfl:   •  sucralfate (CARAFATE) 1 g tablet, TAKE ONE TABLET BY MOUTH FOUR TIMES DAILY, Disp: 90 tablet, Rfl: 3  •  topiramate (TOPAMAX) 25 MG tablet, Take 1 tablet by mouth Every Night., Disp: 60 tablet, Rfl: 0  •  traZODone (DESYREL) 50 MG tablet, Take 50 mg by mouth every night at bedtime., Disp: , Rfl: 2  •  vitamin D (ERGOCALCIFEROL) 53238 units capsule capsule, TAKE ONE (1) CAPSULE ORALLY (BY MOUTH) EVERY WEEK, Disp: , Rfl: 1        Allergies   Allergen Reactions   • Bactrim  [Sulfamethoxazole-Trimethoprim]    • Erythromycin Unknown (See Comments)   • Hydrocodone    • Lisinopril    • Lyrica [Pregabalin]    • Meperidine Hives   • Morphine    • Niacin Hives   • Other Swelling     Erythromycin ophthalmic   • Tramadol Unknown (See Comments)     UNKNOWN           Past Medical History:   Diagnosis Date   • Anxiety    • Asthma    • Colon polyp    • Depression    • Endometriosis    • GERD (gastroesophageal reflux disease)    • Hard to intubate    • Hiatal hernia    • Hiatal hernia    • Hyperlipidemia    • Hypertension    • Injury of back    • Leg pain    • Leukocytosis, likely related to tobacco use    • Liver cyst    • Melanoma (CMS/HCC)     Superficial spreading lentiginous, right lower extremity   • Migraines    • PONV (postoperative nausea and vomiting)            Past Surgical History:   Procedure Laterality Date   • APPENDECTOMY     •  SECTION     • CHOLECYSTECTOMY     • COLONOSCOPY     • COLONOSCOPY N/A 2019    Procedure: COLONOSCOPY;  Surgeon: Jazmyn Cullen MD;  Location: [facility];  Service: Gastroenterology   • ENDOSCOPY N/A 2018    Procedure: ESOPHAGOGASTRODUODENOSCOPY WITH BIOPSIES;  Surgeon: Jazmyn Cullen MD;  Location: [facility] OR;  Service: Gastroenterology   • ENDOSCOPY N/A 10/1/2019    Procedure: ESOPHAGOGASTRODUODENOSCOPY with biopsy;  Surgeon: Martha Hawkins DO;  Location: [facility] OR;  Service:  "Gastroenterology   • HEMORRHOIDECTOMY     • HYSTERECTOMY      partial   • HYSTEROSCOPY ENDOMETRIAL ABLATION     • KNEE SURGERY     • OVARIAN CYST REMOVAL Left     Benign tumor   • OVARY SURGERY Left 2000   • PELVIC LAPAROSCOPY     • SKIN BIOPSY     • SKIN CANCER EXCISION Right     Leg melanoma   • TUBAL ABDOMINAL LIGATION             Social History     Tobacco Use   • Smoking status: Current Every Day Smoker     Packs/day: 0.50     Years: 10.00     Pack years: 5.00     Types: Cigarettes     Start date: 1997   • Smokeless tobacco: Never Used   Substance Use Topics   • Alcohol use: Not Currently     Comment: occassionaly, monthly   • Drug use: No             There is no immunization history on file for this patient.        Physical Exam   Constitutional: She is oriented to person, place, and time. She appears well-developed and well-nourished.   HENT:   Head: Normocephalic and atraumatic.   Right Ear: External ear normal.   Left Ear: External ear normal.   Musculoskeletal: She exhibits no edema or deformity.   Neurological: She is alert and oriented to person, place, and time.   Skin: Skin is warm and dry.   Psychiatric: She has a normal mood and affect. Her behavior is normal.   Nursing note and vitals reviewed.      Debilities/Disabilities Identified: None    Emotional Behavior: Appropriate      /72   Resp 18   Ht 167.6 cm (66\")   Wt 82.6 kg (182 lb)   BMI 29.38 kg/m²         Emmy was seen today for follow-up.    Diagnoses and all orders for this visit:    Fibroadenoma of breast, unspecified laterality    Tobacco abuse    We discussed tobacco cessation and obtaining yearly mammograms.  She may return to the clinic anytime as needed.    Thank you for allowing me to participate in the care of this interesting patient.        "

## 2020-01-02 ENCOUNTER — OFFICE VISIT (OUTPATIENT)
Dept: ORTHOPEDIC SURGERY | Facility: CLINIC | Age: 40
End: 2020-01-02

## 2020-01-02 VITALS — HEIGHT: 66 IN | BODY MASS INDEX: 30.37 KG/M2 | WEIGHT: 189 LBS

## 2020-01-02 DIAGNOSIS — M70.21 OLECRANON BURSITIS OF RIGHT ELBOW: Primary | ICD-10-CM

## 2020-01-02 PROCEDURE — 99213 OFFICE O/P EST LOW 20 MIN: CPT | Performed by: ORTHOPAEDIC SURGERY

## 2020-01-02 NOTE — PROGRESS NOTES
Subjective:     Patient ID: Emmy Dietz is a 39 y.o. female.    Chief Complaint: Follow-up right elbow pain    History of Present Illness  Emmy Dietz returns to clinic today for evaluation of her right elbow.  The patient had prior cortisone injection to the right elbow on 9/10/19. The cortisone injection resolved her swelling but she continues to experience pain.  Today, she rates the pain as 6-7/10, describes it as sharp in nature.    Localizes pain to posterior elbow which radiates into her forearm. She notes some improvement of her pain with applying a topical given to her by pain management but she could not recall the name of the medication.    She denies associated numbness or tingling.    Social History     Occupational History     Employer: UNEMPLOYED   Tobacco Use   • Smoking status: Current Every Day Smoker     Packs/day: 0.50     Years: 10.00     Pack years: 5.00     Types: Cigarettes     Start date:    • Smokeless tobacco: Never Used   Substance and Sexual Activity   • Alcohol use: Not Currently     Comment: occassionaly, monthly   • Drug use: No   • Sexual activity: Yes     Partners: Male      Past Medical History:   Diagnosis Date   • Anxiety    • Asthma    • Colon polyp    • Depression    • Endometriosis    • GERD (gastroesophageal reflux disease)    • Hard to intubate    • Hiatal hernia    • Hiatal hernia    • Hyperlipidemia    • Hypertension    • Injury of back    • Leg pain    • Leukocytosis, likely related to tobacco use    • Liver cyst    • Melanoma (CMS/HCC)     Superficial spreading lentiginous, right lower extremity   • Migraines    • PONV (postoperative nausea and vomiting)      Past Surgical History:   Procedure Laterality Date   • APPENDECTOMY     •  SECTION     • CHOLECYSTECTOMY     • COLONOSCOPY     • COLONOSCOPY N/A 2019    Procedure: COLONOSCOPY;  Surgeon: Jazmyn Cullen MD;  Location: [facility];  Service: Gastroenterology   • ENDOSCOPY N/A 2018     Procedure: ESOPHAGOGASTRODUODENOSCOPY WITH BIOPSIES;  Surgeon: Jazmyn Cullen MD;  Location: [facility];  Service: Gastroenterology   • ENDOSCOPY N/A 10/1/2019    Procedure: ESOPHAGOGASTRODUODENOSCOPY with biopsy;  Surgeon: Martha Hawkins DO;  Location: [facility];  Service: Gastroenterology   • HEMORRHOIDECTOMY     • HYSTERECTOMY      partial   • HYSTEROSCOPY ENDOMETRIAL ABLATION     • KNEE SURGERY     • OVARIAN CYST REMOVAL Left     Benign tumor   • OVARY SURGERY Left 2000   • PELVIC LAPAROSCOPY     • SKIN BIOPSY     • SKIN CANCER EXCISION Right     Leg melanoma   • TUBAL ABDOMINAL LIGATION         Family History   Problem Relation Age of Onset   • Cervical cancer Mother 26   • Skin cancer Mother 54        Basal cell   • Diabetes Mother    • Colon polyps Mother    • Kidney disease Mother    • Miscarriages / Stillbirths Mother    • Skin cancer Father 53        Non-melanoma   • Heart disease Father    • Hypertension Father    • Stroke Father    • Other Daughter         Enlarged spleen   • Migraines Daughter    • Brain cancer Other 60   • Pancreatic cancer Other    • Diabetes Other    • Heart disease Other    • Hypertension Other    • Colon cancer Maternal Uncle    • Breast cancer Maternal Grandmother    • Diabetes Maternal Grandmother    • Heart disease Paternal Grandmother    • Stroke Paternal Grandfather    • Breast cancer Paternal Aunt          Review of Systems   Constitutional: Negative for chills, diaphoresis, fever and unexpected weight change.   HENT: Negative for hearing loss, nosebleeds, sore throat and tinnitus.    Eyes: Negative for pain and visual disturbance.   Respiratory: Negative for cough, shortness of breath and wheezing.    Cardiovascular: Negative for chest pain and palpitations.   Gastrointestinal: Negative for abdominal pain, diarrhea, nausea and vomiting.   Endocrine: Negative for cold intolerance, heat intolerance and polydipsia.   Genitourinary: Negative for difficulty urinating,  "dysuria and hematuria.   Musculoskeletal: Positive for arthralgias and myalgias. Negative for joint swelling.   Skin: Negative for rash and wound.   Allergic/Immunologic: Negative for environmental allergies.   Neurological: Negative for dizziness, syncope and numbness.   Hematological: Does not bruise/bleed easily.   Psychiatric/Behavioral: Negative for dysphoric mood and sleep disturbance. The patient is not nervous/anxious.            Objective:  Vitals:    01/02/20 1024   Weight: 85.7 kg (189 lb)   Height: 167.6 cm (66\")         01/02/20  1024   Weight: 85.7 kg (189 lb)     Body mass index is 30.51 kg/m².  General: No acute distress.  Resp: normal respiratory effort  Skin: no rashes or wounds; normal turgor  Psych: mood and affect appropriate; recent and remote memory intact      Ortho Exam       Right Elbow-  Range of motion 0-155   5/5 strength  No residual swelling over olecranon bursa  Maximal tenderness directly over posterior elbow at olecranon tip  Mildly positive Tinel's  Mild residual erythema  Positive sensation light touch all distributions    Imaging:  none  Assessment:        1. Olecranon bursitis of right elbow           Plan:          1. Discussed treatment options at length with patient at today's visit.   2. If the swelling in her right elbow returns we may consider a repeat cortisone injection.   3. Prescribed diclofenac gel today for relief of her inflammatory pain in her right elbow as the majority of her symptoms seem to be fairly superficial in nature over soft tissues only.      Emmy Dietz was in agreement with plan and had all questions answered.     Orders:  No orders of the defined types were placed in this encounter.      Medications:  New Medications Ordered This Visit   Medications   • diclofenac (VOLTAREN) 1 % gel gel     Sig: Apply 4 g topically to the appropriate area as directed 4 (Four) Times a Day As Needed (pain).     Dispense:  3 tube     Refill:  3 "       Followup:  Return if symptoms worsen or fail to improve.    Emmy was seen today for follow-up and pain.    Diagnoses and all orders for this visit:    Olecranon bursitis of right elbow    Other orders  -     diclofenac (VOLTAREN) 1 % gel gel; Apply 4 g topically to the appropriate area as directed 4 (Four) Times a Day As Needed (pain).        By signing my name here, I Neema Lopez, attest that all documentation on 01/06/20 at 3:00 PM has been prepared under the direction and in the presence of Dr. Lele Garzon.    I, Dr. Lele Garzon, personally performed the services described in this documentation, as scribed by Neema Lopez, in my presence, and it is both accurate and complete.      Dictated utilizing Dragon dictation

## 2020-01-07 ENCOUNTER — OFFICE VISIT (OUTPATIENT)
Dept: ORTHOPEDIC SURGERY | Facility: CLINIC | Age: 40
End: 2020-01-07

## 2020-01-07 VITALS — BODY MASS INDEX: 29.89 KG/M2 | HEIGHT: 66 IN | WEIGHT: 186 LBS

## 2020-01-07 DIAGNOSIS — R52 PAIN: Primary | ICD-10-CM

## 2020-01-07 DIAGNOSIS — M22.41 CHONDROMALACIA OF PATELLOFEMORAL JOINT, RIGHT: ICD-10-CM

## 2020-01-07 PROCEDURE — 73562 X-RAY EXAM OF KNEE 3: CPT | Performed by: ORTHOPAEDIC SURGERY

## 2020-01-07 PROCEDURE — 20610 DRAIN/INJ JOINT/BURSA W/O US: CPT | Performed by: ORTHOPAEDIC SURGERY

## 2020-01-07 PROCEDURE — 99214 OFFICE O/P EST MOD 30 MIN: CPT | Performed by: ORTHOPAEDIC SURGERY

## 2020-01-07 RX ORDER — TRIAMCINOLONE ACETONIDE 40 MG/ML
80 INJECTION, SUSPENSION INTRA-ARTICULAR; INTRAMUSCULAR
Status: COMPLETED | OUTPATIENT
Start: 2020-01-07 | End: 2020-01-07

## 2020-01-07 RX ORDER — LIDOCAINE HYDROCHLORIDE 10 MG/ML
8 INJECTION, SOLUTION EPIDURAL; INFILTRATION; INTRACAUDAL; PERINEURAL
Status: COMPLETED | OUTPATIENT
Start: 2020-01-07 | End: 2020-01-07

## 2020-01-07 RX ADMIN — LIDOCAINE HYDROCHLORIDE 8 ML: 10 INJECTION, SOLUTION EPIDURAL; INFILTRATION; INTRACAUDAL; PERINEURAL at 11:10

## 2020-01-07 RX ADMIN — TRIAMCINOLONE ACETONIDE 80 MG: 40 INJECTION, SUSPENSION INTRA-ARTICULAR; INTRAMUSCULAR at 11:10

## 2020-01-07 NOTE — PROGRESS NOTES
Subjective:     Patient ID: Emmy Dietz is a 39 y.o. female.    Chief Complaint: right knee pain, new issue    History of Present Illness  Emmy Dietz presents to clinic today for evaluation of her right knee. She has been experiencing pain for the past 2-3 months but denies any traumatic injury.   She rates the pain as 6/10, describes it as pressure, aching and throbbing in nature.    Localizes pain to the anterior aspect of her knee.  Her pain radiates down the lateral aspect of her leg to her ankle. She also has intermittent popping sensations.   Has noted improvement with applying a heating pad.    Symptoms are exacerbated with walking and weightbearing.   She denies associated numbness or tingling.  She has had a prior right knee scope with plica excision 2015.     Social History     Occupational History     Employer: UNEMPLOYED   Tobacco Use   • Smoking status: Current Every Day Smoker     Packs/day: 0.50     Years: 10.00     Pack years: 5.00     Types: Cigarettes     Start date:    • Smokeless tobacco: Never Used   Substance and Sexual Activity   • Alcohol use: Not Currently     Comment: occassionaly, monthly   • Drug use: No   • Sexual activity: Yes     Partners: Male      Past Medical History:   Diagnosis Date   • Anxiety    • Asthma    • Colon polyp    • Depression    • Endometriosis    • GERD (gastroesophageal reflux disease)    • Hard to intubate    • Hiatal hernia    • Hiatal hernia    • Hyperlipidemia    • Hypertension    • Injury of back    • Leg pain    • Leukocytosis, likely related to tobacco use    • Liver cyst    • Melanoma (CMS/HCC)     Superficial spreading lentiginous, right lower extremity   • Migraines    • PONV (postoperative nausea and vomiting)      Past Surgical History:   Procedure Laterality Date   • APPENDECTOMY     •  SECTION     • CHOLECYSTECTOMY     • COLONOSCOPY     • COLONOSCOPY N/A 2019    Procedure: COLONOSCOPY;  Surgeon: Jazmyn Cullen MD;   Location:  [facility];  Service: Gastroenterology   • ENDOSCOPY N/A 12/28/2018    Procedure: ESOPHAGOGASTRODUODENOSCOPY WITH BIOPSIES;  Surgeon: Jazmyn Cullen MD;  Location:  [facility];  Service: Gastroenterology   • ENDOSCOPY N/A 10/1/2019    Procedure: ESOPHAGOGASTRODUODENOSCOPY with biopsy;  Surgeon: Martha Hawkins DO;  Location:  [facility];  Service: Gastroenterology   • HEMORRHOIDECTOMY     • HYSTERECTOMY      partial   • HYSTEROSCOPY ENDOMETRIAL ABLATION     • KNEE SURGERY     • OVARIAN CYST REMOVAL Left     Benign tumor   • OVARY SURGERY Left 2000   • PELVIC LAPAROSCOPY     • SKIN BIOPSY     • SKIN CANCER EXCISION Right     Leg melanoma   • TUBAL ABDOMINAL LIGATION         Family History   Problem Relation Age of Onset   • Cervical cancer Mother 26   • Skin cancer Mother 54        Basal cell   • Diabetes Mother    • Colon polyps Mother    • Kidney disease Mother    • Miscarriages / Stillbirths Mother    • Skin cancer Father 53        Non-melanoma   • Heart disease Father    • Hypertension Father    • Stroke Father    • Other Daughter         Enlarged spleen   • Migraines Daughter    • Brain cancer Other 60   • Pancreatic cancer Other    • Diabetes Other    • Heart disease Other    • Hypertension Other    • Colon cancer Maternal Uncle    • Breast cancer Maternal Grandmother    • Diabetes Maternal Grandmother    • Heart disease Paternal Grandmother    • Stroke Paternal Grandfather    • Breast cancer Paternal Aunt          Review of Systems   Constitutional: Negative for chills, diaphoresis, fever and unexpected weight change.   HENT: Negative for hearing loss, nosebleeds, sore throat and tinnitus.    Eyes: Negative for pain and visual disturbance.   Respiratory: Negative for cough, shortness of breath and wheezing.    Cardiovascular: Negative for chest pain and palpitations.   Gastrointestinal: Negative for abdominal pain, diarrhea, nausea and vomiting.   Endocrine: Negative for cold intolerance, heat  "intolerance and polydipsia.   Genitourinary: Negative for difficulty urinating, dysuria and hematuria.   Musculoskeletal: Positive for arthralgias and joint swelling. Negative for myalgias.   Skin: Negative for rash and wound.   Allergic/Immunologic: Negative for environmental allergies.   Neurological: Negative for dizziness, syncope and numbness.   Hematological: Does not bruise/bleed easily.   Psychiatric/Behavioral: Negative for dysphoric mood and sleep disturbance. The patient is not nervous/anxious.            Objective:  Vitals:    01/07/20 1014   Weight: 84.4 kg (186 lb)   Height: 167.6 cm (66\")         01/07/20  1014   Weight: 84.4 kg (186 lb)     Body mass index is 30.02 kg/m².    Physical Exam    Vital signs reviewed.   General: No acute distress, alert and oriented  Eyes: conjunctiva clear; pupils equally round and reactive  ENT: external ears and nose atraumatic; oropharynx clear  CV: no peripheral edema  Resp: normal respiratory effort  Skin: no rashes or wounds; normal turgor  Psych: mood and affect appropriate; recent and remote memory intact      Ortho Exam     Right Knee-    ROM 0-130 degrees  4+/5 on flexion  4+/5 on extension  Maximal tenderness medial and lateral patellar facet   2 quadrant lateral patellar laxity  Effusion- minimal   Active patellar compression test- positive   Log roll-  negative  Stinchfield-  negative  Positive sensation light tough all distributions symmetric to contralateral side  Brisk cap refill all digits  2+ dorsalis pedis pulse    Imaging:  Right Knee X-Ray  Indication: Pain    AP, Lateral, and [place] views    Findings:  No fracture  No bony lesion  Normal soft tissues  Normal joint spaces    No prior x-rays available for comparison  Assessment:        1. Pain    2. Chondromalacia of patellofemoral joint, right           Plan:  Large Joint Arthrocentesis: R knee  Date/Time: 1/7/2020 11:10 AM  Consent given by: patient  Site marked: site marked  Timeout: Immediately " prior to procedure a time out was called to verify the correct patient, procedure, equipment, support staff and site/side marked as required   Supporting Documentation  Indications: pain   Procedure Details  Location: knee - R knee  Preparation: Patient was prepped and draped in the usual sterile fashion  Needle size: 22 G  Approach: superior (LATERAL)  Medications administered: 8 mL lidocaine PF 1% 1 %; 80 mg triamcinolone acetonide 40 MG/ML  Patient tolerance: patient tolerated the procedure well with no immediate complications                1. Discussed treatment options at length with patient at today's visit.   2. Patient given a pilar-pull lite brace today.   3. Patient given at-home exercise program for improvement in strength, range of motion and function with daily activities.  4. Patient would like to proceed with cortisone injection today to the right knee. Recommended limited use of affected extremity for the next 24 hours to only essential activites other than work on general active and passive motion. Recommended supplementing with ice and soft tissue massage. Discussed with patient that they should see results in 5-7 days, if no improvement in 5-6 weeks I have asked them to call the office to review other options. Patient should call office immediately if they notice redness, warmth, fevers, chills, or residual numbness or tingling for greater than 6 hours after injection.     Emmy Dietz was in agreement with plan and had all questions answered.     Orders:  Orders Placed This Encounter   Procedures   • Large Joint Arthrocentesis: R knee   • XR Knee 3+ View With [place] Right       Medications:  No orders of the defined types were placed in this encounter.      Followup:  Return if symptoms worsen or fail to improve.    Emmy was seen today for pain.    Diagnoses and all orders for this visit:    Pain  -     XR Knee 3+ View With [place] Right    Chondromalacia of patellofemoral joint,  right    Other orders  -     Large Joint Arthrocentesis: R knee      By signing my name here, I Neema Lopez, attest that all documentation on 01/07/20 at 2:17 PM has been prepared under the direction and in the presence of Dr. Lele Garzon.    I, Dr. Lele Garzon, personally performed the services described in this documentation, as scribed by Neema Lopez, in my presence, and it is both accurate and complete.      Dictated utilizing Dragon dictation

## 2020-01-08 RX ORDER — DICYCLOMINE HCL 20 MG
TABLET ORAL
Qty: 45 TABLET | Refills: 1 | Status: SHIPPED | OUTPATIENT
Start: 2020-01-08 | End: 2020-06-10 | Stop reason: SDUPTHER

## 2020-01-24 ENCOUNTER — OFFICE VISIT (OUTPATIENT)
Dept: NEUROLOGY | Facility: CLINIC | Age: 40
End: 2020-01-24

## 2020-01-24 VITALS
WEIGHT: 189 LBS | DIASTOLIC BLOOD PRESSURE: 80 MMHG | OXYGEN SATURATION: 98 % | HEIGHT: 66 IN | BODY MASS INDEX: 30.37 KG/M2 | SYSTOLIC BLOOD PRESSURE: 122 MMHG | HEART RATE: 75 BPM

## 2020-01-24 DIAGNOSIS — G43.019 INTRACTABLE MIGRAINE WITHOUT AURA AND WITHOUT STATUS MIGRAINOSUS: Primary | ICD-10-CM

## 2020-01-24 PROCEDURE — 99214 OFFICE O/P EST MOD 30 MIN: CPT | Performed by: NURSE PRACTITIONER

## 2020-01-24 RX ORDER — ATORVASTATIN CALCIUM 40 MG/1
40 TABLET, FILM COATED ORAL DAILY
COMMUNITY
Start: 2020-01-16 | End: 2022-10-26

## 2020-01-24 NOTE — PROGRESS NOTES
Subjective:     Patient ID: Emmy Dietz is a 39 y.o. female presenting to follow up for migraine headaches. She is a previous patient of Dr. Farrar, last see on September 19, 2019. She is here today for a Botox evaluation but she says she has had several Botox treatments in the past with a neurologist in [place] and did not notice any improvement in her headaches. She has not tried any of the CGRP inhibitors. She has tried topiramate, beta blockers, and amitriptyline without improvement. She is currently taking trazodone for sleep. She has an almost daily headache. She takes Excedrin multiple times every day. She drinks at least 4 Mountain Dew's per day.     She has a strong family history of migraine. She has had them since age 20. She states they are always left sided, however Dr. Farrar notes state they are always bilateral frontal and posterior. The pain is described as throbbing. She has photophobia, phonophobia, and nausea with them. Occasional dizziness. She denies any known triggers. At her visit with Dr. Farrar she stated she has had injections but wasn't sure if it was occipital blocks or Botox...    She also has a history of pituitary asymmetry. MRI pituitary done in September 2019 reviewed by Dr. Farrar. Pituitary function labs normal. Dr. Farrar recommended repeat MRI in 1-2 years.    History of Present Illness  The following portions of the patient's history were reviewed and updated as appropriate: allergies, current medications, past family history, past medical history, past social history, past surgical history and problem list.    Review of Systems   Constitutional: Negative for activity change, appetite change and fatigue.   HENT: Negative for ear pain, facial swelling and trouble swallowing.    Eyes: Positive for photophobia (during headache). Negative for pain and visual disturbance.   Respiratory: Negative for choking, chest tightness and shortness of breath.    Cardiovascular:  Negative for chest pain, palpitations and leg swelling.   Gastrointestinal: Positive for abdominal pain. Negative for constipation and nausea.   Endocrine: Negative for polydipsia and polyuria.   Genitourinary: Negative for difficulty urinating, frequency and urgency.   Musculoskeletal: Negative for back pain, gait problem and neck pain.   Skin: Negative for color change, rash and wound.   Allergic/Immunologic: Negative for environmental allergies, food allergies and immunocompromised state.   Neurological: Positive for dizziness and headaches. Negative for tremors, seizures, syncope, facial asymmetry, speech difficulty, weakness, light-headedness and numbness.   Hematological: Negative for adenopathy. Bruises/bleeds easily.   Psychiatric/Behavioral: Positive for decreased concentration. Negative for agitation, behavioral problems, confusion, dysphoric mood, hallucinations, self-injury, sleep disturbance and suicidal ideas. The patient is not nervous/anxious and is not hyperactive.         Objective:    Neurologic Exam  General: Well nourished, well developed, and in no acute distress.  HEENT: Normocephalic/atraumatic. Mucous membranes moist. Sclerae anicteric.   Heart: Regular rate and rhythm. No murmurs, rubs or gallops.  Lungs: Clear to auscultation bilaterally.  Skin: No notable rashes or lesions on the visible surfaces.   Extremities: No clubbing, cyanosis or significant edema.   Psychiatric: Pleasant, cooperative, and appropriate.   Neurologic Exam:  Mental Status:  Alert and oriented. Speech is fluent. Comprehension is intact.   Cranial Nerves II-XII: Pupils equal, round, reactive to light. Extraocular movements are full and conjugate in all directions. Pursuit movements do not provoke any apparent dizziness or discomfort.  No nystagmus noted.  Hearing is intact to voice. Facial strength and sensation are preserved and symmetric. Tongue and palate midline. Voice non-hoarse, non-dysarthric.   Motor: Normal  bulk and tone of bilateral upper and lower extremities. Strength is 5/5 in all 4 extremities both proximally and distally. There are no abnormal or involuntary movements noted.  Sensation: Intact to light touch throughout. Romberg was negative with no significant sway.   Coordination: Fully intact. Finger-to-nose performed accurately bilaterally.  Reflexes: The deep tendon reflexes are 2+/4 in bilateral biceps, brachioradialis, triceps, patella, and Achilles.  No pathologic reflexes were noted.  Gait: Normal. No ataxia or apraxia.   Physical Exam   Constitutional: She appears well-developed and well-nourished.   Neck: Normal range of motion. Neck supple.   Nursing note and vitals reviewed.      Assessment/Plan:     Emmy was seen today for migraine.    Diagnoses and all orders for this visit:    Intractable migraine without aura and without status migrainosus    Other orders  -     galcanezumab-gnlm (EMGALITY) 120 MG/ML prefilled syringe; Inject 1 mL under the skin into the appropriate area as directed Every 30 (Thirty) Days.         1. Headaches: Given the frequency of her headaches at that she states she has tried several treatments with Botox without benefit, I am hesitant to retry this. I recommended starting Emgality and she agrees to do so. Risks, benefits, SE reviewed. Edgardo showed to patient. She was educated on how to administer the injection and voiced understanding. She knows this is to be taken every 30 days. I did educate her on medication overuse headache and that she needs to discontinue the use of ibuprofen/Excedrin. She seems reluctant to do so but voiced understanding of this. She also drinks at least 4 Mountain Dew's per day which is also very likely to be worsening her headaches. I advised her to cut this down to at least only 1-2 per day and try to increase her water intake. I will see her back in 3 months to discuss response to Emgality. She will call in the meantime with any problems.     2.  Pituitary asymmetry: Stable on last MRI reviewed by Dr. Farrar. Pituitary function normal. Will repeat MRI in 9 months (1 year from previous MRI).

## 2020-04-16 ENCOUNTER — TELEMEDICINE (OUTPATIENT)
Dept: NEUROLOGY | Facility: CLINIC | Age: 40
End: 2020-04-16

## 2020-04-16 DIAGNOSIS — G43.019 INTRACTABLE MIGRAINE WITHOUT AURA AND WITHOUT STATUS MIGRAINOSUS: ICD-10-CM

## 2020-04-16 DIAGNOSIS — R90.89 ABNORMAL BRAIN MRI: Primary | ICD-10-CM

## 2020-04-16 PROCEDURE — 99213 OFFICE O/P EST LOW 20 MIN: CPT | Performed by: NURSE PRACTITIONER

## 2020-04-16 NOTE — PROGRESS NOTES
Subjective   Emmy Dietz is a 39 y.o. female presenting for follow up for migraine.   My last visit with the patient was 3 months ago.     I performed this clinical encounter by utilizing a real-time telehealth video connection between my location and the patient's location at home. I obtained verbal consent from the patient to perform this clinical encounter utilizing video and prepared the patient by answering any questions they had about the telehealth interaction.     She was given samples of Emgality at her last viist. She took the loading dose right away and said she did very well with almost no migraines for one month. Unfortunately she has not takent eh medication with that time because she was not able to get it filled at her pharmacy due to issues with the prior authorization.     She also has a history of pituitary asymmetry. MRI pituitary done in September 2019 reviewed by Dr. Farrar. Pituitary function labs normal. Dr. Farrar recommended repeat MRI in 1-2 years, which would be this October.         History of Present Illness     The following portions of the patient's history were reviewed and updated as appropriate: allergies, current medications, past family history, past medical history, past social history, past surgical history and problem list.    Review of Systems    Objective   Physical Exam  Not performed due to limitations of telehealth visit.     Assessment/Plan   Emmy was seen today for migraine.    Diagnoses and all orders for this visit:    Abnormal brain MRI  -     MRI pituitary w wo contrast; Future    Intractable migraine without aura and without status migrainosus    Other orders  -     galcanezumab-gnlm (Emgality) 120 MG/ML prefilled syringe; Inject 1 mL under the skin into the appropriate area as directed Every 30 (Thirty) Days.      1. Migraines: Will resubmit PA for Emgality. Hopefully we can get it approved for her since she did so well the month she was given a sample.    2. Abnormal pituitary MRI: Will repeat MRI in 6 months. Order placed today for October 2020.     She will f/u for migraines and MRI results after MRI in October. She is to call in the meantime with any problems.

## 2020-04-17 ENCOUNTER — TELEPHONE (OUTPATIENT)
Dept: NEUROLOGY | Facility: CLINIC | Age: 40
End: 2020-04-17

## 2020-04-17 NOTE — TELEPHONE ENCOUNTER
Patient is scheduled for October appointment and inquired about a PA while on the phone and getting her medication approved. Mariana has submitted the PA and we are waiting to hear back. Patient hung up after put on hold while I found out information. If patient has any questions please forward call to Mariana at [facility]. Thank you.

## 2020-06-11 RX ORDER — DICYCLOMINE HCL 20 MG
10 TABLET ORAL 4 TIMES DAILY
Qty: 180 TABLET | Refills: 3 | Status: SHIPPED | OUTPATIENT
Start: 2020-06-11 | End: 2022-10-26

## 2020-08-05 ENCOUNTER — TELEMEDICINE (OUTPATIENT)
Dept: GASTROENTEROLOGY | Facility: CLINIC | Age: 40
End: 2020-08-05

## 2020-08-05 ENCOUNTER — PREP FOR SURGERY (OUTPATIENT)
Dept: OTHER | Facility: HOSPITAL | Age: 40
End: 2020-08-05

## 2020-08-05 DIAGNOSIS — K21.00 GERD WITH ESOPHAGITIS: Primary | ICD-10-CM

## 2020-08-05 DIAGNOSIS — R13.10 DYSPHAGIA, UNSPECIFIED TYPE: ICD-10-CM

## 2020-08-05 DIAGNOSIS — K58.0 IRRITABLE BOWEL SYNDROME WITH DIARRHEA: ICD-10-CM

## 2020-08-05 DIAGNOSIS — Z86.19 HISTORY OF HELICOBACTER PYLORI INFECTION: ICD-10-CM

## 2020-08-05 DIAGNOSIS — K21.9 GERD WITHOUT ESOPHAGITIS: ICD-10-CM

## 2020-08-05 DIAGNOSIS — R13.10 DYSPHAGIA, UNSPECIFIED TYPE: Primary | ICD-10-CM

## 2020-08-05 DIAGNOSIS — K21.00 GERD WITH ESOPHAGITIS: ICD-10-CM

## 2020-08-05 PROCEDURE — 99214 OFFICE O/P EST MOD 30 MIN: CPT | Performed by: NURSE PRACTITIONER

## 2020-08-05 RX ORDER — SACCHAROMYCES BOULARDII 250 MG
250 CAPSULE ORAL 2 TIMES DAILY
Qty: 60 CAPSULE | Refills: 11 | Status: SHIPPED | OUTPATIENT
Start: 2020-08-05 | End: 2021-08-05

## 2020-08-05 RX ORDER — OMEPRAZOLE 40 MG/1
40 CAPSULE, DELAYED RELEASE ORAL 2 TIMES DAILY
Qty: 180 CAPSULE | Refills: 3 | Status: SHIPPED | OUTPATIENT
Start: 2020-08-05 | End: 2021-10-06

## 2020-08-05 NOTE — PROGRESS NOTES
Tele visit:  Unable to complete visit using a video connection to the patient. A phone visit was used to complete this visits. You have chosen to receive care through a telephone visit. Do you consent to use a telephone visit for your medical care today? Yes    PATIENT INFORMATION  Emmy Dietz       - 1980    CHIEF COMPLAINT  Chief Complaint   Patient presents with   • Follow-up     YEARLY F/U ON GERD       HISTORY OF PRESENT ILLNESS  Reflux is bad with tomatoes or spicy and greasy sometimes with honey bread.  Having a lot of pain where HH is both day and night. At night feels a lot of pressure on her stomach.      Dr. Cyr EGD 10/1/19: no path report avail, photos show z line irreg, gastritis and pos for h pylori tx with:  1) PEPTO BISMOL TABS 525 MG PO 4 TIMES DAILY X 14 DAYS.  2) FLAGYL 250 MG PO 4 TIMES DAILY X 14 DAYS.  3) DOXYCYCLINE 100 MG  3) CONTINUE OMEPRAZOLE TWICE DAILY.  4) CARAFATE/SUCRALFATE -   WILL HOLD UNTIL ANTIBIOTICS ARE COMPLETE    Dr. Cullen 19 colon:Polyps are hyperplastic, 5yr recall  family hx    She reports she was on Nexium 40mg po bid in the distant past and that controlled her reflux but then someone took her down on the dose.   Is taking omeprazole 20mg once a day otc d/t lack of insurance coverage .    Discussed good rx will send in  Omeprazole.  Takes bentyl every day helps with irritable bowel mostly diarrhea.     Also reports dysphagia and some difficulty with pills or bread getting stuck and washes down with water. This is going on every day now since last year.  This was not mentioned or addressed with oct EGD.           REVIEW OF SYSTEMS  Review of Systems      ACTIVE PROBLEMS  Patient Active Problem List    Diagnosis   • GERD with esophagitis [K21.0]   • Irritable bowel syndrome with diarrhea [K58.0]   • History of Helicobacter pylori infection [Z86.19]   • Intractable migraine without aura and without status migrainosus [G43.019]   • Chondromalacia  of patellofemoral joint, right [M22.41]   • Epigastric pain [R10.13]   • Nausea [R11.0]   • Olecranon bursitis of right elbow [M70.21]   • Rectal bleeding [K62.5]   • Family history of GI malignancy [Z80.0]   • Gastroesophageal reflux disease [K21.9]   • Pain of upper abdomen [R10.10]   • Leukocytosis [D72.829]         PAST MEDICAL HISTORY  Past Medical History:   Diagnosis Date   • Anxiety    • Asthma    • Colon polyp    • Depression    • Endometriosis    • GERD (gastroesophageal reflux disease)    • Hard to intubate    • Hiatal hernia    • Hiatal hernia    • Hyperlipidemia    • Hypertension    • Injury of back    • Leg pain    • Leukocytosis, likely related to tobacco use    • Liver cyst    • Melanoma (CMS/HCC)     Superficial spreading lentiginous, right lower extremity   • Migraines    • PONV (postoperative nausea and vomiting)          SURGICAL HISTORY  Past Surgical History:   Procedure Laterality Date   • APPENDECTOMY     •  SECTION     • CHOLECYSTECTOMY     • COLONOSCOPY     • COLONOSCOPY N/A 2019    Procedure: COLONOSCOPY;  Surgeon: Jazmyn Cullen MD;  Location:  [facility] OR;  Service: Gastroenterology   • ENDOSCOPY N/A 2018    Procedure: ESOPHAGOGASTRODUODENOSCOPY WITH BIOPSIES;  Surgeon: Jazmyn Cullen MD;  Location: [facility] OR;  Service: Gastroenterology   • ENDOSCOPY N/A 10/1/2019    Procedure: ESOPHAGOGASTRODUODENOSCOPY with biopsy;  Surgeon: Martha Hawkins DO;  Location: [facility] OR;  Service: Gastroenterology   • HEMORRHOIDECTOMY     • HYSTERECTOMY      partial   • HYSTEROSCOPY ENDOMETRIAL ABLATION     • KNEE SURGERY     • OVARIAN CYST REMOVAL Left     Benign tumor   • OVARY SURGERY Left    • PELVIC LAPAROSCOPY     • SKIN BIOPSY     • SKIN CANCER EXCISION Right     Leg melanoma   • TUBAL ABDOMINAL LIGATION           FAMILY HISTORY  Family History   Problem Relation Age of Onset   • Cervical cancer Mother 26   • Skin cancer Mother 54        Basal cell   • Diabetes Mother    •  Colon polyps Mother    • Kidney disease Mother    • Miscarriages / Stillbirths Mother    • Skin cancer Father 53        Non-melanoma   • Heart disease Father    • Hypertension Father    • Stroke Father    • Other Daughter         Enlarged spleen   • Migraines Daughter    • Brain cancer Other 60   • Pancreatic cancer Other    • Diabetes Other    • Heart disease Other    • Hypertension Other    • Colon cancer Maternal Uncle    • Breast cancer Maternal Grandmother    • Diabetes Maternal Grandmother    • Heart disease Paternal Grandmother    • Stroke Paternal Grandfather    • Breast cancer Paternal Aunt          SOCIAL HISTORY  Social History     Occupational History     Employer: UNEMPLOYED   Tobacco Use   • Smoking status: Current Every Day Smoker     Packs/day: 0.50     Years: 10.00     Pack years: 5.00     Types: Cigarettes     Start date: 1997   • Smokeless tobacco: Never Used   Substance and Sexual Activity   • Alcohol use: Not Currently     Comment: occassionaly, monthly   • Drug use: No   • Sexual activity: Yes     Partners: Male         CURRENT MEDICATIONS    Current Outpatient Medications:   •  amLODIPine (NORVASC) 5 MG tablet, Take 10 mg by mouth Daily., Disp: , Rfl:   •  atorvastatin (LIPITOR) 10 MG tablet, Take 10 mg by mouth Daily., Disp: , Rfl: 2  •  atorvastatin (LIPITOR) 20 MG tablet, Take 20 mg by mouth Daily., Disp: , Rfl:   •  buPROPion XL (WELLBUTRIN XL) 300 MG 24 hr tablet, TAKE ONE (1) TABLET ORALLY (BY MOUTH) DAILY, Disp: , Rfl: 1  •  Clotrimazole-Betameth & Zn Ox 1-0.05 & 20 % therapy, Apply  topically., Disp: , Rfl:   •  clotrimazole-betamethasone (LOTRISONE) 1-0.05 % lotion, PLACE FOUR TO FIVE DROPS OUTSIDE THE LEFT EAR TWICE DAILY FOR ONE WEEK THEN CONTINUE FOR 2-3 TIMES A WEEK, Disp: , Rfl: 5  •  diclofenac (VOLTAREN) 1 % gel gel, Apply 4 g topically to the appropriate area as directed 4 (Four) Times a Day As Needed (pain)., Disp: 3 tube, Rfl: 3  •  dicyclomine (BENTYL) 20 MG tablet, Take  0.5 tablets by mouth 4 (Four) Times a Day., Disp: 180 tablet, Rfl: 3  •  galcanezumab-gnlm (Emgality) 120 MG/ML prefilled syringe, Inject 1 mL under the skin into the appropriate area as directed Every 30 (Thirty) Days., Disp: 1 mL, Rfl: 5  •  loratadine (CLARITIN) 10 MG tablet, Take 10 mg by mouth Daily., Disp: , Rfl: 2  •  metoprolol succinate XL (TOPROL-XL) 25 MG 24 hr tablet, Take 25 mg by mouth Daily., Disp: , Rfl: 2  •  mupirocin (BACTROBAN) 2 % ointment, Apply  topically to the appropriate area as directed 3 (Three) Times a Day., Disp: , Rfl:   •  omeprazole (priLOSEC) 40 MG capsule, Take 1 capsule by mouth 2 (two) times a day., Disp: 180 capsule, Rfl: 3  •  ondansetron ODT (ZOFRAN-ODT) 4 MG disintegrating tablet, dissolve one tablet orally EVERY 8 HOURS AS NEEDED FOR nausea, Disp: , Rfl: 0  •  polyethylene glycol (MIRALAX) packet, Take 17 g by mouth 2 (Two) Times a Day As Needed (constipation)., Disp: 20 each, Rfl: 0  •  saccharomyces boulardii (FLORASTOR) 250 MG capsule, Take 1 capsule by mouth 2 (Two) Times a Day., Disp: 60 capsule, Rfl: 11  •  sertraline (ZOLOFT) 100 MG tablet, Take 100 mg by mouth Daily., Disp: , Rfl:   •  sucralfate (CARAFATE) 1 g tablet, TAKE ONE TABLET BY MOUTH FOUR TIMES DAILY, Disp: 90 tablet, Rfl: 3  •  topiramate (TOPAMAX) 25 MG tablet, Take 1 tablet by mouth Every Night., Disp: 60 tablet, Rfl: 0  •  traZODone (DESYREL) 50 MG tablet, Take 50 mg by mouth every night at bedtime., Disp: , Rfl: 2  •  vitamin D (ERGOCALCIFEROL) 97791 units capsule capsule, TAKE ONE (1) CAPSULE ORALLY (BY MOUTH) EVERY WEEK, Disp: , Rfl: 1    ALLERGIES  Lisinopril; Morphine; Tramadol; Bactrim [sulfamethoxazole-trimethoprim]; Hydrocodone; Lyrica [pregabalin]; Meperidine; Niacin; Other; and Erythromycin    VITALS  There were no vitals filed for this visit.    LAST RESULTS   Hospital Outpatient Visit on 12/11/2019   Component Date Value Ref Range Status   • Case Report 12/11/2019    Final                     "Value:Surgical Pathology Report                         Case: [id number]                                  Authorizing Provider:  Martha Hawkins DO    Collected:           12/11/2019 12:40 PM          Ordering Location:     [facility]   Received:            12/11/2019 12:48 PM                                 ULTRASOUND                                                                   Pathologist:           Emmanuel Ty MD                                                         Specimen:    Breast, Right, time in 1228                                                               • Clinical Information 12/11/2019    Final                    Value:This result contains rich text formatting which cannot be displayed here.   • Final Diagnosis 12/11/2019    Final                    Value:This result contains rich text formatting which cannot be displayed here.   • Comment 12/11/2019    Final                    Value:This result contains rich text formatting which cannot be displayed here.   • Gross Description 12/11/2019    Final                    Value:This result contains rich text formatting which cannot be displayed here.   • Microscopic Description 12/11/2019    Final                    Value:This result contains rich text formatting which cannot be displayed here.     No results found.    PHYSICAL EXAM  Debilities/Disabilities Identified: None  Emotional Behavior: Appropriate  Wt Readings from Last 3 Encounters:   01/24/20 85.7 kg (189 lb)   01/07/20 84.4 kg (186 lb)   01/02/20 85.7 kg (189 lb)     Ht Readings from Last 1 Encounters:   01/24/20 167.6 cm (65.98\")     There is no height or weight on file to calculate BMI.  Physical Exam   Constitutional: She is oriented to person, place, and time. She appears well-developed and well-nourished.   HENT:   Head: Normocephalic and atraumatic.   Eyes: Pupils are equal, round, and reactive to light. Conjunctivae are normal.   Neck: Normal range of " motion. Neck supple.   Cardiovascular: Normal rate and regular rhythm.   Pulmonary/Chest: Effort normal and breath sounds normal.   Abdominal: Soft. Bowel sounds are normal. She exhibits no distension. There is tenderness in the epigastric area.   Musculoskeletal: Normal range of motion.   Lymphadenopathy:     She has no cervical adenopathy.   Neurological: She is alert and oriented to person, place, and time.   Skin: Skin is warm and dry.   Psychiatric: She has a normal mood and affect. Her behavior is normal.   Nursing note and vitals reviewed.  physical exam per pt report via telemedicine    CLINICAL DATA REVIEWED   reviewed previous lab results and integrated with today's visit, reviewed notes from other physicians and/or last GI encounter, reviewed previous endoscopy results and available photos    ASSESSMENT  Diagnoses and all orders for this visit:    GERD with esophagitis  -     saccharomyces boulardii (FLORASTOR) 250 MG capsule; Take 1 capsule by mouth 2 (Two) Times a Day.  -     omeprazole (priLOSEC) 40 MG capsule; Take 1 capsule by mouth 2 (two) times a day.    Irritable bowel syndrome with diarrhea  -     saccharomyces boulardii (FLORASTOR) 250 MG capsule; Take 1 capsule by mouth 2 (Two) Times a Day.  -     omeprazole (priLOSEC) 40 MG capsule; Take 1 capsule by mouth 2 (two) times a day.    History of Helicobacter pylori infection    Dysphagia, unspecified type    Will get scheduled for EGD       PLAN  Return in about 2 months (around 10/5/2020).     EGD for dysphagia and acid reflux.  Omeprazole 40 bid as this was dose that made her symptomatic years ago.      I have discussed the above plan with the patient.  They verbalize understanding and are in agreement with the plan.  They have been advised to contact the office for any questions, concerns, or changes related to their health.    30 min spent with patient >50% counseling.

## 2020-08-10 ENCOUNTER — TELEPHONE (OUTPATIENT)
Dept: GASTROENTEROLOGY | Facility: CLINIC | Age: 40
End: 2020-08-10

## 2020-08-10 PROBLEM — R13.10 DYSPHAGIA: Status: ACTIVE | Noted: 2020-08-10

## 2020-08-10 NOTE — TELEPHONE ENCOUNTER
CALLED AND SPOKE WITH PATIENT.  SCHEDULED EGD AT [facility] 08/26/2020 AT 12:30PM - ARRIVE 11:30AM.  E-MAIL INSTRUCTIONS [email or web address] TODAY.    COVID TEST 08/24/2020 AT 9:10AM.

## 2020-08-19 ENCOUNTER — TRANSCRIBE ORDERS (OUTPATIENT)
Dept: ADMINISTRATIVE | Facility: HOSPITAL | Age: 40
End: 2020-08-19

## 2020-08-19 DIAGNOSIS — Z01.818 PREOP EXAMINATION: Primary | ICD-10-CM

## 2020-08-24 ENCOUNTER — LAB (OUTPATIENT)
Dept: LAB | Facility: HOSPITAL | Age: 40
End: 2020-08-24

## 2020-08-24 DIAGNOSIS — Z01.818 PREOP EXAMINATION: ICD-10-CM

## 2020-08-24 PROCEDURE — U0004 COV-19 TEST NON-CDC HGH THRU: HCPCS

## 2020-08-24 PROCEDURE — C9803 HOPD COVID-19 SPEC COLLECT: HCPCS

## 2020-08-24 PROCEDURE — U0002 COVID-19 LAB TEST NON-CDC: HCPCS

## 2020-08-25 ENCOUNTER — ANESTHESIA EVENT (OUTPATIENT)
Dept: PERIOP | Facility: HOSPITAL | Age: 40
End: 2020-08-25

## 2020-08-25 LAB
REF LAB TEST METHOD: NORMAL
SARS-COV-2 RNA RESP QL NAA+PROBE: NOT DETECTED

## 2020-08-26 ENCOUNTER — ANESTHESIA (OUTPATIENT)
Dept: PERIOP | Facility: HOSPITAL | Age: 40
End: 2020-08-26

## 2020-08-26 ENCOUNTER — HOSPITAL ENCOUNTER (OUTPATIENT)
Facility: HOSPITAL | Age: 40
Setting detail: HOSPITAL OUTPATIENT SURGERY
Discharge: HOME | End: 2020-08-26
Attending: INTERNAL MEDICINE | Admitting: INTERNAL MEDICINE

## 2020-08-26 VITALS
HEART RATE: 65 BPM | BODY MASS INDEX: 34.94 KG/M2 | SYSTOLIC BLOOD PRESSURE: 111 MMHG | RESPIRATION RATE: 16 BRPM | DIASTOLIC BLOOD PRESSURE: 73 MMHG | OXYGEN SATURATION: 96 % | WEIGHT: 216.4 LBS | TEMPERATURE: 98.4 F

## 2020-08-26 DIAGNOSIS — K21.00 GERD WITH ESOPHAGITIS: ICD-10-CM

## 2020-08-26 DIAGNOSIS — R13.10 DYSPHAGIA, UNSPECIFIED TYPE: ICD-10-CM

## 2020-08-26 PROCEDURE — 43239 EGD BIOPSY SINGLE/MULTIPLE: CPT | Performed by: INTERNAL MEDICINE

## 2020-08-26 PROCEDURE — 25010000002 PROPOFOL 10 MG/ML EMULSION: Performed by: NURSE ANESTHETIST, CERTIFIED REGISTERED

## 2020-08-26 PROCEDURE — 88305 TISSUE EXAM BY PATHOLOGIST: CPT | Performed by: INTERNAL MEDICINE

## 2020-08-26 RX ORDER — SODIUM CHLORIDE, SODIUM LACTATE, POTASSIUM CHLORIDE, CALCIUM CHLORIDE 600; 310; 30; 20 MG/100ML; MG/100ML; MG/100ML; MG/100ML
9 INJECTION, SOLUTION INTRAVENOUS CONTINUOUS
Status: DISCONTINUED | OUTPATIENT
Start: 2020-08-26 | End: 2020-08-26 | Stop reason: HOSPADM

## 2020-08-26 RX ORDER — MAGNESIUM HYDROXIDE 1200 MG/15ML
LIQUID ORAL AS NEEDED
Status: DISCONTINUED | OUTPATIENT
Start: 2020-08-26 | End: 2020-08-26 | Stop reason: HOSPADM

## 2020-08-26 RX ORDER — PROPOFOL 10 MG/ML
VIAL (ML) INTRAVENOUS AS NEEDED
Status: DISCONTINUED | OUTPATIENT
Start: 2020-08-26 | End: 2020-08-26 | Stop reason: SURG

## 2020-08-26 RX ORDER — SODIUM CHLORIDE 0.9 % (FLUSH) 0.9 %
10 SYRINGE (ML) INJECTION AS NEEDED
Status: DISCONTINUED | OUTPATIENT
Start: 2020-08-26 | End: 2020-08-26 | Stop reason: HOSPADM

## 2020-08-26 RX ORDER — GLYCOPYRROLATE 0.2 MG/ML
INJECTION INTRAMUSCULAR; INTRAVENOUS AS NEEDED
Status: DISCONTINUED | OUTPATIENT
Start: 2020-08-26 | End: 2020-08-26 | Stop reason: SURG

## 2020-08-26 RX ORDER — SODIUM CHLORIDE, SODIUM LACTATE, POTASSIUM CHLORIDE, CALCIUM CHLORIDE 600; 310; 30; 20 MG/100ML; MG/100ML; MG/100ML; MG/100ML
100 INJECTION, SOLUTION INTRAVENOUS CONTINUOUS
Status: DISCONTINUED | OUTPATIENT
Start: 2020-08-26 | End: 2020-08-26 | Stop reason: HOSPADM

## 2020-08-26 RX ORDER — SODIUM CHLORIDE 9 MG/ML
40 INJECTION, SOLUTION INTRAVENOUS AS NEEDED
Status: DISCONTINUED | OUTPATIENT
Start: 2020-08-26 | End: 2020-08-26 | Stop reason: HOSPADM

## 2020-08-26 RX ORDER — ONDANSETRON 2 MG/ML
4 INJECTION INTRAMUSCULAR; INTRAVENOUS ONCE AS NEEDED
Status: DISCONTINUED | OUTPATIENT
Start: 2020-08-26 | End: 2020-08-26 | Stop reason: HOSPADM

## 2020-08-26 RX ORDER — LIDOCAINE HYDROCHLORIDE 10 MG/ML
0.5 INJECTION, SOLUTION EPIDURAL; INFILTRATION; INTRACAUDAL; PERINEURAL ONCE AS NEEDED
Status: DISCONTINUED | OUTPATIENT
Start: 2020-08-26 | End: 2020-08-26 | Stop reason: HOSPADM

## 2020-08-26 RX ORDER — SODIUM CHLORIDE 0.9 % (FLUSH) 0.9 %
10 SYRINGE (ML) INJECTION EVERY 12 HOURS SCHEDULED
Status: DISCONTINUED | OUTPATIENT
Start: 2020-08-26 | End: 2020-08-26 | Stop reason: HOSPADM

## 2020-08-26 RX ORDER — LIDOCAINE HYDROCHLORIDE 20 MG/ML
INJECTION, SOLUTION INFILTRATION; PERINEURAL AS NEEDED
Status: DISCONTINUED | OUTPATIENT
Start: 2020-08-26 | End: 2020-08-26 | Stop reason: SURG

## 2020-08-26 RX ADMIN — LIDOCAINE HYDROCHLORIDE 100 MG: 20 INJECTION, SOLUTION INFILTRATION; PERINEURAL at 12:39

## 2020-08-26 RX ADMIN — PROPOFOL 50 MG: 10 INJECTION, EMULSION INTRAVENOUS at 12:45

## 2020-08-26 RX ADMIN — PROPOFOL 50 MG: 10 INJECTION, EMULSION INTRAVENOUS at 12:39

## 2020-08-26 RX ADMIN — PROPOFOL 50 MG: 10 INJECTION, EMULSION INTRAVENOUS at 12:43

## 2020-08-26 RX ADMIN — PROPOFOL 50 MG: 10 INJECTION, EMULSION INTRAVENOUS at 12:49

## 2020-08-26 RX ADMIN — PROPOFOL 50 MG: 10 INJECTION, EMULSION INTRAVENOUS at 12:42

## 2020-08-26 RX ADMIN — GLYCOPYRROLATE 0.1 MG: 0.2 INJECTION INTRAMUSCULAR; INTRAVENOUS at 12:39

## 2020-08-26 RX ADMIN — PROPOFOL 50 MG: 10 INJECTION, EMULSION INTRAVENOUS at 12:47

## 2020-08-26 RX ADMIN — PROPOFOL 50 MG: 10 INJECTION, EMULSION INTRAVENOUS at 12:40

## 2020-08-26 RX ADMIN — SODIUM CHLORIDE, POTASSIUM CHLORIDE, SODIUM LACTATE AND CALCIUM CHLORIDE 9 ML/HR: 600; 310; 30; 20 INJECTION, SOLUTION INTRAVENOUS at 11:32

## 2020-08-26 NOTE — ANESTHESIA POSTPROCEDURE EVALUATION
Patient: Emmy Dietz    Procedure Summary     Date:  08/26/20 Room / Location:  [facility] ENDOSCOPY 1 /  [facility] OR    Anesthesia Start:  1237 Anesthesia Stop:  1253    Procedure:  ESOPHAGOGASTRODUODENOSCOPY (N/A Esophagus) Diagnosis:       Dysphagia, unspecified type      GERD with esophagitis      (Dysphagia, unspecified type [R13.10])      (GERD with esophagitis [K21.0])    Surgeon:  Yunier Salinas MD Provider:  Clemencia Jones CRNA    Anesthesia Type:  MAC ASA Status:  2          Anesthesia Type: MAC    Vitals  Vitals Value Taken Time   /73 8/26/2020  1:57 PM   Temp     Pulse 65 8/26/2020  1:57 PM   Resp 16 8/26/2020  1:57 PM   SpO2 96 % 8/26/2020  1:57 PM           Post Anesthesia Care and Evaluation    Patient location during evaluation: PHASE II  Patient participation: complete - patient participated  Level of consciousness: awake  Pain management: adequate  Airway patency: patent  Anesthetic complications: No anesthetic complications  PONV Status: none  Cardiovascular status: acceptable  Respiratory status: acceptable  Hydration status: acceptable

## 2020-08-26 NOTE — ANESTHESIA PREPROCEDURE EVALUATION
Anesthesia Evaluation     Patient summary reviewed and Nursing notes reviewed   history of anesthetic complications (prior record shows easy intubation): PONV  NPO Solid Status: > 8 hours  NPO Liquid Status: > 8 hours           Airway   Mallampati: II  TM distance: <3 FB  Neck ROM: full  Possible difficult intubation  Dental      Pulmonary - normal exam    breath sounds clear to auscultation  (+) asthma (exercise induced or seasonal),  Cardiovascular - normal exam  Exercise tolerance: good (4-7 METS)    ECG reviewed  Rhythm: regular  Rate: normal    (+) hypertension well controlled, hyperlipidemia,       Neuro/Psych  (+) headaches (a long time ago), psychiatric history Anxiety and Depression,     GI/Hepatic/Renal/Endo    (+)  hiatal hernia, GERD well controlled,  liver disease,   (-) GI bleed    Musculoskeletal     (+) back pain, chronic pain,   Abdominal   (+) obese,    Substance History - negative use     OB/GYN negative ob/gyn ROS         Other   arthritis,    history of cancer remission                    Anesthesia Plan    ASA 2     MAC     intravenous induction     Anesthetic plan, all risks, benefits, and alternatives have been provided, discussed and informed consent has been obtained with: patient.  Use of blood products discussed with patient  Consented to blood products.

## 2020-09-14 ENCOUNTER — TELEPHONE (OUTPATIENT)
Dept: ORTHOPEDIC SURGERY | Facility: CLINIC | Age: 40
End: 2020-09-14

## 2020-10-28 ENCOUNTER — HOSPITAL ENCOUNTER (OUTPATIENT)
Dept: MRI IMAGING | Facility: HOSPITAL | Age: 40
Discharge: HOME | End: 2020-10-28
Admitting: NURSE PRACTITIONER

## 2020-10-28 DIAGNOSIS — R90.89 ABNORMAL BRAIN MRI: ICD-10-CM

## 2020-10-28 PROCEDURE — 82565 ASSAY OF CREATININE: CPT

## 2020-10-28 PROCEDURE — A9577 INJ MULTIHANCE: HCPCS | Performed by: NURSE PRACTITIONER

## 2020-10-28 PROCEDURE — 70553 MRI BRAIN STEM W/O & W/DYE: CPT

## 2020-10-28 PROCEDURE — 0 GADOBENATE DIMEGLUMINE 529 MG/ML SOLUTION: Performed by: NURSE PRACTITIONER

## 2020-10-28 RX ADMIN — GADOBENATE DIMEGLUMINE 17 ML: 529 INJECTION, SOLUTION INTRAVENOUS at 11:31

## 2020-10-29 ENCOUNTER — TELEPHONE (OUTPATIENT)
Dept: NEUROLOGY | Facility: CLINIC | Age: 40
End: 2020-10-29

## 2020-11-11 ENCOUNTER — TELEPHONE (OUTPATIENT)
Dept: ORTHOPEDIC SURGERY | Facility: CLINIC | Age: 40
End: 2020-11-11

## 2020-11-13 LAB — CREAT BLDA-MCNC: 0.8 MG/DL (ref 0.6–1.3)

## 2020-11-18 ENCOUNTER — PATIENT MESSAGE (OUTPATIENT)
Dept: GASTROENTEROLOGY | Facility: CLINIC | Age: 40
End: 2020-11-18

## 2020-11-18 DIAGNOSIS — R11.0 NAUSEA: Primary | ICD-10-CM

## 2020-11-19 RX ORDER — SUCRALFATE 1 G/1
1 TABLET ORAL 4 TIMES DAILY
Qty: 120 TABLET | Refills: 11 | Status: SHIPPED | OUTPATIENT
Start: 2020-11-19 | End: 2021-12-06

## 2020-11-19 RX ORDER — ONDANSETRON 4 MG/1
4 TABLET, ORALLY DISINTEGRATING ORAL EVERY 8 HOURS PRN
Qty: 45 TABLET | Refills: 3 | Status: SHIPPED | OUTPATIENT
Start: 2020-11-19

## 2020-11-19 NOTE — TELEPHONE ENCOUNTER
I have sent in a new prescription for you for zofran for nausea.  Remember to follow the low acid diet instructions:   Don't eat late, don't eat fried and don't eat too much at one time. Avoid tomato based products like pizza, lasagna, spagetti.  If you want to have these take an over the counter Pepcid immediately before you eat them.  Do not eat within 3-4 hrs of bedtime. Limit caffeine and carbonated beverages, if you must have them do not have later in the day.  If reflux is severe elevate the head of the bed. You may also use TUMS, maalox, or mylanta for breakthrough heartburn.

## 2020-11-23 ENCOUNTER — TRANSCRIBE ORDERS (OUTPATIENT)
Dept: ADMINISTRATIVE | Facility: HOSPITAL | Age: 40
End: 2020-11-23

## 2020-11-23 DIAGNOSIS — Z12.39 SCREENING BREAST EXAMINATION: Primary | ICD-10-CM

## 2021-01-20 ENCOUNTER — TELEPHONE (OUTPATIENT)
Dept: GASTROENTEROLOGY | Facility: CLINIC | Age: 41
End: 2021-01-20

## 2021-02-23 ENCOUNTER — OFFICE VISIT (OUTPATIENT)
Dept: ORTHOPEDIC SURGERY | Facility: CLINIC | Age: 41
End: 2021-02-23

## 2021-02-23 VITALS
BODY MASS INDEX: 32.95 KG/M2 | DIASTOLIC BLOOD PRESSURE: 75 MMHG | WEIGHT: 205 LBS | HEIGHT: 66 IN | SYSTOLIC BLOOD PRESSURE: 116 MMHG | HEART RATE: 82 BPM

## 2021-02-23 DIAGNOSIS — M70.22 OLECRANON BURSITIS OF LEFT ELBOW: Primary | ICD-10-CM

## 2021-02-23 PROBLEM — M25.521 ARTHRALGIA OF RIGHT ELBOW: Status: ACTIVE | Noted: 2020-08-11

## 2021-02-23 PROBLEM — N80.9 ENDOMETRIOSIS: Status: ACTIVE | Noted: 2021-02-23

## 2021-02-23 PROBLEM — M51.379 DEGENERATION OF LUMBOSACRAL INTERVERTEBRAL DISC: Status: ACTIVE | Noted: 2020-03-20

## 2021-02-23 PROBLEM — M51.37 DEGENERATION OF LUMBOSACRAL INTERVERTEBRAL DISC: Status: ACTIVE | Noted: 2020-03-20

## 2021-02-23 PROBLEM — R10.2 PELVIC PAIN: Status: ACTIVE | Noted: 2019-02-07

## 2021-02-23 PROBLEM — M79.10 MUSCLE PAIN: Status: ACTIVE | Noted: 2020-03-20

## 2021-02-23 PROBLEM — R63.4 ABNORMAL LOSS OF WEIGHT: Status: ACTIVE | Noted: 2021-02-23

## 2021-02-23 PROBLEM — R19.7 D (DIARRHEA): Status: ACTIVE | Noted: 2021-02-23

## 2021-02-23 PROBLEM — K92.1 BLOOD IN FECES: Status: ACTIVE | Noted: 2021-02-23

## 2021-02-23 PROBLEM — K58.9 ADAPTIVE COLITIS: Status: ACTIVE | Noted: 2021-02-23

## 2021-02-23 PROBLEM — K64.9 BLEEDING HEMORRHOID: Status: ACTIVE | Noted: 2021-02-23

## 2021-02-23 PROBLEM — N73.6 PELVIC ADHESIONS: Status: ACTIVE | Noted: 2019-03-07

## 2021-02-23 PROBLEM — M79.7 FIBROMYALGIA: Status: ACTIVE | Noted: 2020-04-16

## 2021-02-23 PROCEDURE — 99214 OFFICE O/P EST MOD 30 MIN: CPT | Performed by: ORTHOPAEDIC SURGERY

## 2021-02-23 RX ORDER — METHYLPREDNISOLONE 4 MG/1
TABLET ORAL
Qty: 1 EACH | Refills: 0 | Status: SHIPPED | OUTPATIENT
Start: 2021-02-23 | End: 2021-08-11

## 2021-02-23 RX ORDER — IBUPROFEN 800 MG/1
800 TABLET ORAL 3 TIMES DAILY
Qty: 90 TABLET | Refills: 0 | Status: SHIPPED | OUTPATIENT
Start: 2021-02-23 | End: 2021-03-23

## 2021-02-23 NOTE — PROGRESS NOTES
Subjective:     Patient ID: Emmy Dubon is a 40 y.o. female.    Chief Complaint: Left elbow pain, new patient    History of Present Illness  Emmy Dubon presents to clinic today for evaluation of left elbow pain following a fall injury about three weeks prior. The pain has some radiation into the arm but is localized primarily over the posterior elbow.  She did note some initial swelling at that point in time which has subsided since the time of her injury. Her pain is rated 5/10 in severity today and is aching, sharp, and shootin in quality.  The pain is aggravated by resisted activities and full flexion or extension but alleviated by ice and 800 mg ibuprofin.  Patient denies tingling and numbness.       Social History     Occupational History     Employer: UNEMPLOYED   Tobacco Use   • Smoking status: Current Every Day Smoker     Packs/day: 0.50     Years: 10.00     Pack years: 5.00     Types: Cigarettes     Start date:    • Smokeless tobacco: Never Used   Substance and Sexual Activity   • Alcohol use: Not Currently     Comment: occassionaly, monthly   • Drug use: No   • Sexual activity: Yes     Partners: Male      Past Medical History:   Diagnosis Date   • Anxiety    • Asthma    • Colon polyp    • Depression    • Endometriosis    • GERD (gastroesophageal reflux disease)    • Hard to intubate    • Hiatal hernia    • Hiatal hernia    • Hyperlipidemia    • Hypertension    • Injury of back    • Leg pain    • Leukocytosis, likely related to tobacco use    • Liver cyst    • Melanoma (CMS/HCC)     Superficial spreading lentiginous, right lower extremity   • Migraines    • PONV (postoperative nausea and vomiting)      Past Surgical History:   Procedure Laterality Date   • APPENDECTOMY     •  SECTION     • CHOLECYSTECTOMY     • COLONOSCOPY     • COLONOSCOPY N/A 2019    Procedure: COLONOSCOPY;  Surgeon: Jazmyn Cullen MD;  Location: [facility];  Service: Gastroenterology   • ENDOSCOPY N/A 2018     Procedure: ESOPHAGOGASTRODUODENOSCOPY WITH BIOPSIES;  Surgeon: Jazmyn Cullen MD;  Location:  [facility] OR;  Service: Gastroenterology   • ENDOSCOPY N/A 10/1/2019    Procedure: ESOPHAGOGASTRODUODENOSCOPY with biopsy;  Surgeon: Martha Hawkins DO;  Location:  [facility] OR;  Service: Gastroenterology   • ENDOSCOPY N/A 8/26/2020    Procedure: ESOPHAGOGASTRODUODENOSCOPY;  Surgeon: Yunier Salinas MD;  Location:  [facility] OR;  Service: Gastroenterology;  Laterality: N/A;  Reflux esophagitis  Distal esophagus biopsy   • HEMORRHOIDECTOMY     • HYSTERECTOMY      partial   • HYSTEROSCOPY ENDOMETRIAL ABLATION     • KNEE SURGERY     • OVARIAN CYST REMOVAL Left     Benign tumor   • OVARY SURGERY Left 2000   • PELVIC LAPAROSCOPY     • SKIN BIOPSY     • SKIN CANCER EXCISION Right     Leg melanoma   • TUBAL ABDOMINAL LIGATION         Family History   Problem Relation Age of Onset   • Cervical cancer Mother 26   • Skin cancer Mother 54        Basal cell   • Diabetes Mother    • Colon polyps Mother    • Kidney disease Mother    • Miscarriages / Stillbirths Mother    • Skin cancer Father 53        Non-melanoma   • Heart disease Father    • Hypertension Father    • Stroke Father    • Other Daughter         Enlarged spleen   • Migraines Daughter    • Brain cancer Other 60   • Pancreatic cancer Other    • Diabetes Other    • Heart disease Other    • Hypertension Other    • Colon cancer Maternal Uncle    • Breast cancer Maternal Grandmother    • Diabetes Maternal Grandmother    • Heart disease Paternal Grandmother    • Stroke Paternal Grandfather    • Breast cancer Paternal Aunt          Review of Systems   Constitutional: Negative for chills, diaphoresis, fever and unexpected weight change.   HENT: Negative for hearing loss, nosebleeds, sore throat and tinnitus.    Eyes: Negative for pain and visual disturbance.   Respiratory: Negative for cough, shortness of breath and wheezing.    Cardiovascular: Negative for chest pain and  "palpitations.   Gastrointestinal: Negative for abdominal pain, diarrhea, nausea and vomiting.   Endocrine: Negative for cold intolerance, heat intolerance and polydipsia.   Genitourinary: Negative for difficulty urinating, dysuria and hematuria.   Musculoskeletal: Positive for joint swelling and myalgias. Negative for arthralgias.   Skin: Negative for rash and wound.   Allergic/Immunologic: Negative for environmental allergies.   Neurological: Negative for dizziness, syncope and numbness.   Hematological: Does not bruise/bleed easily.   Psychiatric/Behavioral: Negative for dysphoric mood and sleep disturbance. The patient is not nervous/anxious.            Objective:  Vitals:    02/23/21 0857   BP: 116/75   BP Location: Right arm   Pulse: 82   Weight: 93 kg (205 lb)   Height: 167.6 cm (66\")         02/23/21  0857   Weight: 93 kg (205 lb)     Body mass index is 33.09 kg/m².  Physical Exam    Vital signs reviewed.   General: No acute distress, alert and oriented  Eyes: conjunctiva clear; pupils equally round and reactive  ENT: external ears and nose atraumatic; oropharynx clear  CV: no peripheral edema  Resp: normal respiratory effort  Skin: no rashes or wounds; normal turgor  Psych: mood and affect appropriate; recent and remote memory intact          Ortho Exam     left Elbow-  Contusion over posterior elbow and proximal ulna with sensitivity to light touch  ROM 5-135 degrees  Flexion- 4+/5 strength  Extension- 4+/5 strength  Supination - 80, 4/5 strength  Pronation - 90, 4/5 strength  Stable to varus and valgus stress at 0 and 30 degrees  Tinel's over radial tunnel- Positive  Tinel's over cubital tunnel- Positive   4+/5 strength finger and thumb flex/ext  Positive sensation to light touch all distributions  2+ radial pulse      Imaging:    Elbow Comp 3 Vws Left  Findings: No fracture or dislocation. No joint effusion or significant soft tissue swelling. Joint spaces and alignment are maintained.  Impression: No " acute abnormality.  Eliseo Alberto MD 02/04/2021  [facility]    Review of elbow x-rays from [facility] including review of images as well as radiology report as noted above indicate no evidence of fracture, dislocation, or subluxation.  No comparison images available  Assessment:        1. Olecranon bursitis of left elbow           Plan:          1. Discussed treatment options at length with patient at today's visit including continued rest and stretching, topical anti-inflammatory cream, and oral steroid.  2. Patient opts for topical cream and oral steroid.  3. Prescribe ibuprofin, topical Pensed sample, and medrol dosepak. Recommended ibuprofin be started once medrol dosepak is complete.  4. Follow-up as needed if pain is not resolved.      Emmy Dubon was in agreement with plan and had all questions answered.     Orders:  No orders of the defined types were placed in this encounter.      Medications:  New Medications Ordered This Visit   Medications   • methylPREDNISolone (MEDROL) 4 MG dose pack     Sig: Take as directed on package instructions.     Dispense:  1 each     Refill:  0   • ibuprofen (ADVIL,MOTRIN) 800 MG tablet     Sig: Take 1 tablet by mouth 3 (Three) Times a Day.     Dispense:  90 tablet     Refill:  0       Followup:  Return if symptoms worsen or fail to improve.    Diagnoses and all orders for this visit:    1. Olecranon bursitis of left elbow (Primary)    Other orders  -     methylPREDNISolone (MEDROL) 4 MG dose pack; Take as directed on package instructions.  Dispense: 1 each; Refill: 0  -     ibuprofen (ADVIL,MOTRIN) 800 MG tablet; Take 1 tablet by mouth 3 (Three) Times a Day.  Dispense: 90 tablet; Refill: 0        SCRIBE ATTESTATION:  Sandro JACKSON, attest that all medical record entries for this patient were documented by me acting as a medical scribe for Lele Garzon MD.    PROVIDER ATTESTATION:  Lele JACKSON MD, personally performed the  services described in this documentation. All medical record entries made by the scribe were at my direction and in my presence. I have reviewed the chart and discharge instructions and agree that the record reflects my personal performance and is accurate and complete.  Lele Garzon MD.    Electronically signed: Lele Garzon MD 2/23/2021 10:24 EST       Dictated utilizing Dragon dictation

## 2021-03-22 ENCOUNTER — LAB REQUISITION (OUTPATIENT)
Dept: LAB | Facility: HOSPITAL | Age: 41
End: 2021-03-22

## 2021-03-22 DIAGNOSIS — Z00.00 ENCOUNTER FOR GENERAL ADULT MEDICAL EXAMINATION WITHOUT ABNORMAL FINDINGS: ICD-10-CM

## 2021-03-22 LAB — SARS-COV-2 ORF1AB RESP QL NAA+PROBE: NOT DETECTED

## 2021-03-22 PROCEDURE — U0004 COV-19 TEST NON-CDC HGH THRU: HCPCS | Performed by: OTOLARYNGOLOGY

## 2021-03-23 RX ORDER — IBUPROFEN 800 MG/1
TABLET ORAL
Qty: 90 TABLET | Refills: 0 | Status: SHIPPED | OUTPATIENT
Start: 2021-03-23 | End: 2021-04-26

## 2021-04-08 ENCOUNTER — LAB (OUTPATIENT)
Dept: LAB | Facility: HOSPITAL | Age: 41
End: 2021-04-08

## 2021-04-08 ENCOUNTER — OFFICE VISIT (OUTPATIENT)
Dept: NEUROLOGY | Facility: CLINIC | Age: 41
End: 2021-04-08

## 2021-04-08 VITALS
WEIGHT: 217.8 LBS | HEART RATE: 72 BPM | HEIGHT: 66 IN | DIASTOLIC BLOOD PRESSURE: 84 MMHG | OXYGEN SATURATION: 97 % | BODY MASS INDEX: 35 KG/M2 | SYSTOLIC BLOOD PRESSURE: 142 MMHG

## 2021-04-08 DIAGNOSIS — G43.719 INTRACTABLE CHRONIC MIGRAINE WITHOUT AURA AND WITHOUT STATUS MIGRAINOSUS: ICD-10-CM

## 2021-04-08 DIAGNOSIS — O92.70 LACTATION DISORDER: Primary | ICD-10-CM

## 2021-04-08 DIAGNOSIS — O92.70 LACTATION DISORDER: ICD-10-CM

## 2021-04-08 LAB
FSH SERPL-ACNC: 1.99 MIU/ML
LH SERPL-ACNC: 2.88 MIU/ML
PROLACTIN SERPL-MCNC: 13.9 NG/ML (ref 4.79–23.3)

## 2021-04-08 PROCEDURE — 36415 COLL VENOUS BLD VENIPUNCTURE: CPT

## 2021-04-08 PROCEDURE — 99213 OFFICE O/P EST LOW 20 MIN: CPT | Performed by: NURSE PRACTITIONER

## 2021-04-08 PROCEDURE — 83002 ASSAY OF GONADOTROPIN (LH): CPT

## 2021-04-08 PROCEDURE — 84146 ASSAY OF PROLACTIN: CPT

## 2021-04-08 PROCEDURE — 83001 ASSAY OF GONADOTROPIN (FSH): CPT

## 2021-04-08 RX ORDER — TRIAMCINOLONE ACETONIDE 1 MG/G
CREAM TOPICAL
COMMUNITY
Start: 2021-02-23 | End: 2021-08-05

## 2021-04-08 RX ORDER — TOPIRAMATE 25 MG/1
25 TABLET ORAL
COMMUNITY
Start: 2021-03-23

## 2021-04-08 RX ORDER — SODIUM CHLORIDE 0.65 %
AEROSOL, SPRAY (ML) NASAL
COMMUNITY
Start: 2021-01-18

## 2021-04-08 RX ORDER — GALCANEZUMAB 120 MG/ML
120 INJECTION, SOLUTION SUBCUTANEOUS
Qty: 1 ML | Refills: 11 | Status: SHIPPED | OUTPATIENT
Start: 2021-04-08 | End: 2021-07-29 | Stop reason: SDUPTHER

## 2021-04-08 RX ORDER — HYDROCHLOROTHIAZIDE 12.5 MG/1
12.5 CAPSULE, GELATIN COATED ORAL DAILY
COMMUNITY
Start: 2021-03-24 | End: 2022-10-26

## 2021-04-08 NOTE — PROGRESS NOTES
Subjective:     Patient ID: Emmy Dubon is a 40 y.o. female presenting for follow-up.  My last visit with the patient was on April 16, 2020.  She has a history of migraine headaches.  She is currently treated with Emgality 120 mg every 30 days.  This works very well for her.  She states that she rarely has a migraine when she takes this medication.  She is a previous patient of Dr. Kimball.  He had ordered an MRI of her brain that showed asymmetry of the pituitary gland.  I ordered a repeat MRI of the pituitary that was performed in October 2021.  Imaging was normal at that time.  The patient has had pituitary labs that were all normal.  She does state that she has continued lactating since the birth of her daughter 14 years ago.  She only breast-fed her daughter for 3 months but has not stopped lactating since that time.  She states that she frequently leaks breastmilk.  Prolactin level was normal when she saw Dr. Leni barboza.  She apparently has a history of abnormal FSH levels, however I do not see any abnormal levels in her chart at this time.    History of Present Illness  The following portions of the patient's history were reviewed and updated as appropriate: allergies, current medications, past family history, past medical history, past social history, past surgical history and problem list.    Review of Systems   Constitutional: Negative for chills, fatigue and fever.   HENT: Positive for hearing loss. Negative for tinnitus and trouble swallowing.    Eyes: Negative for photophobia, redness and visual disturbance.   Respiratory: Negative for cough, shortness of breath and wheezing.    Cardiovascular: Negative for chest pain, palpitations and leg swelling.   Gastrointestinal: Negative for diarrhea, nausea and vomiting.   Endocrine: Negative for cold intolerance, heat intolerance and polydipsia.   Genitourinary: Negative for decreased urine volume, difficulty urinating and urgency.   Musculoskeletal:  Negative for back pain, neck pain and neck stiffness.   Skin: Negative for color change, rash and wound.   Allergic/Immunologic: Negative for environmental allergies, food allergies and immunocompromised state.   Neurological: Positive for dizziness and headaches. Negative for tremors, seizures, syncope, facial asymmetry, speech difficulty, weakness, light-headedness and numbness.   Hematological: Negative for adenopathy. Does not bruise/bleed easily.   Psychiatric/Behavioral: Negative for confusion and sleep disturbance. The patient is not nervous/anxious.         Objective:    Neurologic Exam  General: Well nourished, well developed, and in no acute distress.  HEENT: Normocephalic/atraumatic. Mucous membranes moist. Sclerae anicteric.   Heart: Regular rate and rhythm. No murmurs, rubs or gallops.  Lungs: Clear to auscultation bilaterally.  Skin: No notable rashes or lesions on the visible surfaces.   Extremities: No clubbing, cyanosis or significant edema.   Psychiatric: Pleasant, cooperative, and appropriate.   Neurologic Exam:  Mental Status:  Alert and oriented. Speech is fluent. Comprehension is intact.   Cranial Nerves II-XII: Pupils equal, round, reactive to light. Extraocular movements are full and conjugate in all directions. Pursuit movements do not provoke any apparent dizziness or discomfort.  No nystagmus noted.  Hearing is intact to voice. Facial strength and sensation are preserved and symmetric. Tongue and palate midline. Voice non-hoarse, non-dysarthric.   Motor: Normal bulk and tone of bilateral upper and lower extremities. Strength is 5/5 in all 4 extremities both proximally and distally. There are no abnormal or involuntary movements noted.  Sensation: Intact to light touch throughout. Romberg was negative with no significant sway.   Coordination: Fully intact. Finger-to-nose performed accurately bilaterally.  Reflexes: The deep tendon reflexes are 2+/4 in bilateral biceps, brachioradialis,  triceps, patella, and Achilles.  No pathologic reflexes were noted.  Gait: Normal. No ataxia or apraxia.       Physical Exam    Assessment/Plan:     Diagnoses and all orders for this visit:    1. Lactation disorder (Primary)  -     Prolactin; Future  -     FSH & LH; Future    2. Intractable chronic migraine without aura and without status migrainosus        1. Migraine headaches: They are significantly improved with Emgality 120 mg every 30 days. She will continue this. No changes made today.   2. Lactation disorder: MRI pituitary in October 2020 was normal. I am repeating a prolactin level today along with FSH and LH given reports of abnormal FSH levels in the past. If these are abnormal I will refer her to endocrinology. If these are normal I recommended she discuss this with her OB/GYN.     Will f/u in 6 months for migraine headaches. She is to call in the meantime with any problems.

## 2021-04-22 ENCOUNTER — HOSPITAL ENCOUNTER (OUTPATIENT)
Dept: MAMMOGRAPHY | Facility: HOSPITAL | Age: 41
Discharge: HOME | End: 2021-04-22
Admitting: NURSE PRACTITIONER

## 2021-04-22 DIAGNOSIS — Z12.39 SCREENING BREAST EXAMINATION: ICD-10-CM

## 2021-04-22 PROCEDURE — 77063 BREAST TOMOSYNTHESIS BI: CPT

## 2021-04-22 PROCEDURE — 77067 SCR MAMMO BI INCL CAD: CPT

## 2021-04-26 RX ORDER — IBUPROFEN 800 MG/1
TABLET ORAL
Qty: 90 TABLET | Refills: 0 | Status: SHIPPED | OUTPATIENT
Start: 2021-04-26 | End: 2021-05-20

## 2021-05-20 RX ORDER — IBUPROFEN 800 MG/1
TABLET ORAL
Qty: 90 TABLET | Refills: 0 | Status: SHIPPED | OUTPATIENT
Start: 2021-05-20 | End: 2021-08-05

## 2021-06-09 ENCOUNTER — TELEPHONE (OUTPATIENT)
Dept: ORTHOPEDIC SURGERY | Facility: CLINIC | Age: 41
End: 2021-06-09

## 2021-06-17 ENCOUNTER — APPOINTMENT (OUTPATIENT)
Dept: LAB | Facility: HOSPITAL | Age: 41
End: 2021-06-17

## 2021-07-29 RX ORDER — GALCANEZUMAB 120 MG/ML
120 INJECTION, SOLUTION SUBCUTANEOUS
Qty: 1 ML | Refills: 11 | Status: SHIPPED | OUTPATIENT
Start: 2021-07-29 | End: 2022-07-18

## 2021-07-29 NOTE — TELEPHONE ENCOUNTER
Caller: ESTRELLITA NAIDU     Relationship: SELF    Best call back number: [phone]    Medication needed:   Requested Prescriptions     Pending Prescriptions Disp Refills   • galcanezumab-gnlm (Emgality) 120 MG/ML auto-injector pen 1 mL 11     Sig: Inject 1 mL under the skin into the appropriate area as directed Every 30 (Thirty) Days.       When do you need the refill by: ASAP    What additional details did the patient provide when requesting the medication: THE PT STATED SHE HAD HER LAST EMGALITY SHOT ON THE FIRST OF LAST MONTH.    Does the patient have less than a 3 day supply:  [x] Yes  [] No    What is the patient's preferred pharmacy: [id number] [place] [address]  [phone] [facility] [phone]

## 2021-08-03 RX ORDER — GALCANEZUMAB 120 MG/ML
INJECTION, SOLUTION SUBCUTANEOUS
Qty: 1 ML | Refills: 11 | OUTPATIENT
Start: 2021-08-03

## 2021-08-05 ENCOUNTER — OFFICE VISIT (OUTPATIENT)
Dept: GASTROENTEROLOGY | Facility: CLINIC | Age: 41
End: 2021-08-05

## 2021-08-05 VITALS
DIASTOLIC BLOOD PRESSURE: 80 MMHG | BODY MASS INDEX: 34.55 KG/M2 | HEIGHT: 66 IN | SYSTOLIC BLOOD PRESSURE: 110 MMHG | WEIGHT: 215 LBS

## 2021-08-05 DIAGNOSIS — K58.0 IRRITABLE BOWEL SYNDROME WITH DIARRHEA: Primary | ICD-10-CM

## 2021-08-05 DIAGNOSIS — Z86.19 HISTORY OF HELICOBACTER PYLORI INFECTION: ICD-10-CM

## 2021-08-05 DIAGNOSIS — R12 HEARTBURN: ICD-10-CM

## 2021-08-05 DIAGNOSIS — K21.00 GASTROESOPHAGEAL REFLUX DISEASE WITH ESOPHAGITIS WITHOUT HEMORRHAGE: ICD-10-CM

## 2021-08-05 DIAGNOSIS — R10.13 EPIGASTRIC PAIN: ICD-10-CM

## 2021-08-05 PROCEDURE — 99214 OFFICE O/P EST MOD 30 MIN: CPT | Performed by: NURSE PRACTITIONER

## 2021-08-05 RX ORDER — AMITRIPTYLINE HYDROCHLORIDE 25 MG/1
25 TABLET, FILM COATED ORAL NIGHTLY
Qty: 90 TABLET | Refills: 3 | Status: SHIPPED | OUTPATIENT
Start: 2021-08-05 | End: 2022-10-26

## 2021-08-05 RX ORDER — FAMOTIDINE 40 MG/1
40 TABLET, FILM COATED ORAL 2 TIMES DAILY
Qty: 180 TABLET | Refills: 3 | Status: SHIPPED | OUTPATIENT
Start: 2021-08-05 | End: 2022-06-13

## 2021-08-05 RX ORDER — IBUPROFEN 800 MG/1
TABLET ORAL
Qty: 90 TABLET | Refills: 0 | Status: SHIPPED | OUTPATIENT
Start: 2021-08-05 | End: 2021-10-12

## 2021-08-05 NOTE — PROGRESS NOTES
"PATIENT INFORMATION  Emmy Duobn     - 1980    CHIEF COMPLAINT  Chief Complaint   Patient presents with   • Heartburn       HISTORY OF PRESENT ILLNESS  20 EGD: EGD was for Dysphagia biopsy was Positive for Reflux esophagitis  Negative for Mchugh's (and no stricture) (no evidence of HP)     Dr. Cyr EGD 10/1/19: no path report avail, photos show z line irreg, gastritis and pos for h pylori tx with:  1) PEPTO BISMOL TABS 525 MG PO 4 TIMES DAILY X 14 DAYS.  2) FLAGYL 250 MG PO 4 TIMES DAILY X 14 DAYS.  3) DOXYCYCLINE 100 MG  3) CONTINUE OMEPRAZOLE TWICE DAILY.  4) CARAFATE/SUCRALFATE -   WILL HOLD UNTIL ANTIBIOTICS ARE COMPLETE      was placed back on her Omeprazole 40 bid (which healed her in  but then someone took her down to 20mg once a day and she was back in the office with dysphagia and odonophagia in less than a year. She was also tx for HP at that time but did not show any evidence of reinfection with this EGD.)     Dr. Cullen 19 colon:Polyps are hyperplastic, 5yr recall  family hx     Last visit is to f/u on her symptoms since back on the omeprazole 40 bid and to discuss low acid diet and her need to continue on the PPI permanently!    Today:  Omeprazole 40bid.   bms 5-6 times a day and loose.  Every once in a while BRRB.  Does have some diarrhea in the night but is on a water pill so is getting up to pee at night and wakes up with \"acid in her throat\". Takes ibuprofen rarley d/t gastritis with it.  Is getting relief with bentyl if she takes 2 doses in a day but if she takes more she gets constipation     REVIEWED PERTINENT RESULTS/ LABS  Lab Results   Component Value Date    CASEREPORT  2020     Surgical Pathology Report                         Case: [id number]                                  Authorizing Provider:  Yunier Salinas        Collected:           2020 12:48 PM                                 MD Opal                                             "                       Ordering Location:     [facility]   Received:            08/26/2020 02:15 PM                                 OR                                                                           Pathologist:           Lisa Shook MD                                                    Specimen:    Esophagus, Distal                                                                          FINALDX  08/26/2020     1. Distal Esophagus, Biopsy:    A. Minimally inflamed gastroesophageal junction mucosa.   B. Negative for goblet cell metaplasia and dysplasia.    m/jse        Lab Results   Component Value Date    HGB 13.9 03/11/2019    MCV 91.5 03/11/2019     03/11/2019    ALT 88 (H) 03/11/2019    AST 29 03/11/2019      No results found.    CURRENT MEDICATIONS    Current Outpatient Medications:   •  amLODIPine (NORVASC) 10 MG tablet, Take 10 mg by mouth Daily., Disp: , Rfl:   •  atorvastatin (LIPITOR) 40 MG tablet, Take 40 mg by mouth Daily., Disp: , Rfl:   •  Breo Ellipta 100-25 MCG/INH inhaler, INHALE 1 PUFF BY MOUTH EVERY DAY rinse MOUTH AFTER each use, Disp: , Rfl:   •  clotrimazole-betamethasone (LOTRISONE) 1-0.05 % lotion, PLACE FOUR TO FIVE DROPS OUTSIDE THE LEFT EAR TWICE DAILY FOR ONE WEEK THEN CONTINUE FOR 2-3 TIMES A WEEK, Disp: , Rfl: 5  •  Cyanocobalamin (VITAMIN B-12 PO), Take  by mouth., Disp: , Rfl:   •  dicyclomine (BENTYL) 20 MG tablet, Take 0.5 tablets by mouth 4 (Four) Times a Day., Disp: 180 tablet, Rfl: 3  •  galcanezumab-gnlm (Emgality) 120 MG/ML auto-injector pen, Inject 1 mL under the skin into the appropriate area as directed Every 30 (Thirty) Days., Disp: 1 mL, Rfl: 11  •  HM Saline Nasal Spray 0.65 % nasal spray, , Disp: , Rfl:   •  hydroCHLOROthiazide (MICROZIDE) 12.5 MG capsule, Take 12.5 mg by mouth Daily., Disp: , Rfl:   •  ibuprofen (ADVIL,MOTRIN) 800 MG tablet, TAKE 1 TABLET BY MOUTH THREE TIMES DAILY( EVERY EIGHT HOURS), Disp: 90 tablet, Rfl:  0  •  loratadine (CLARITIN) 10 MG tablet, Take 10 mg by mouth Daily., Disp: , Rfl: 2  •  Magnesium Hydroxide (MAGNESIA PO), Take  by mouth., Disp: , Rfl:   •  metoprolol succinate XL (TOPROL-XL) 25 MG 24 hr tablet, Take 25 mg by mouth Daily., Disp: , Rfl: 2  •  omeprazole (priLOSEC) 40 MG capsule, Take 1 capsule by mouth 2 (two) times a day., Disp: 180 capsule, Rfl: 3  •  ondansetron ODT (ZOFRAN-ODT) 4 MG disintegrating tablet, Place 1 tablet on the tongue Every 8 (Eight) Hours As Needed for Nausea or Vomiting., Disp: 45 tablet, Rfl: 3  •  polyethylene glycol (MIRALAX) packet, Take 17 g by mouth 2 (Two) Times a Day As Needed (constipation)., Disp: 20 each, Rfl: 0  •  sertraline (ZOLOFT) 100 MG tablet, Take 100 mg by mouth Daily., Disp: , Rfl:   •  sucralfate (CARAFATE) 1 g tablet, Take 1 tablet by mouth 4 (Four) Times a Day., Disp: 120 tablet, Rfl: 11  •  topiramate (TOPAMAX) 25 MG tablet, Take 25 mg by mouth every night at bedtime., Disp: , Rfl:   •  traZODone (DESYREL) 50 MG tablet, Take 50 mg by mouth every night at bedtime., Disp: , Rfl: 2  •  vitamin D (ERGOCALCIFEROL) 58346 units capsule capsule, TAKE ONE (1) CAPSULE ORALLY (BY MOUTH) EVERY WEEK, Disp: , Rfl: 1  •  amitriptyline (ELAVIL) 25 MG tablet, Take 1 tablet by mouth Every Night., Disp: 90 tablet, Rfl: 3  •  Clotrimazole-Betameth & Zn Ox 1-0.05 & 20 % therapy, Apply  topically., Disp: , Rfl:   •  famotidine (PEPCID) 40 MG tablet, Take 1 tablet by mouth 2 (Two) Times a Day. With lunch and at bedtime, Disp: 180 tablet, Rfl: 3  •  methylPREDNISolone (MEDROL) 4 MG dose pack, Take as directed on package instructions., Disp: 1 each, Rfl: 0  •  saccharomyces boulardii (FLORASTOR) 250 MG capsule, Take 1 capsule by mouth 2 (Two) Times a Day., Disp: 60 capsule, Rfl: 11  •  triamcinolone (KENALOG) 0.1 % cream, APPLY TOPICALLY TWICE DAILY FOR FOURTEEN DAYS, THEN TAKE ONE WEEK OFF AND REPEAT, Disp: , Rfl:     ALLERGIES  Lisinopril, Morphine, Tramadol, Bactrim  [sulfamethoxazole-trimethoprim], Hydrocodone, Lyrica [pregabalin], Meperidine, Niacin, Other, and Erythromycin    REVIEW OF SYSTEMS  ROS: 14 point review of systems was performed and all other systems were reviewed and are negative except for documented findings in HPI and today's encounter.   Review of Systems   Gastrointestinal: Positive for abdominal distention, abdominal pain, diarrhea, nausea and vomiting.         History     Last Reviewed by Katiana Mcclain APRN on 8/5/2021 at  9:48 AM    Sections Reviewed    Tobacco, Family, Medical, Surgical      Problem list reviewed by Katiana Mcclain APRN on 8/5/2021 at  9:48 AM  Medicines reviewed by Katiana Mcclain APRN on 8/5/2021 at  9:48 AM  Allergies reviewed by Katiana Mcclain APRN on 8/5/2021 at  9:48 AM      ACTIVE PROBLEMS  Patient Active Problem List    Diagnosis    • Abnormal loss of weight [R63.4]    • Adaptive colitis [K58.9]    • Bleeding hemorrhoid [K64.9]    • Blood in feces [K92.1]    • D (diarrhea) [R19.7]    • Endometriosis [N80.9]    • Olecranon bursitis of left elbow [M70.22]    • Arthralgia of right elbow [M25.521]    • Dysphagia [R13.10]    • GERD with esophagitis [K21.00]    • Irritable bowel syndrome with diarrhea [K58.0]    • History of Helicobacter pylori infection [Z86.19]    • Fibromyalgia [M79.7]    • Degeneration of lumbosacral intervertebral disc [M51.37]    • Muscle pain [M79.10]    • Intractable migraine without aura and without status migrainosus [G43.019]    • Chondromalacia of patellofemoral joint, right [M22.41]    • Epigastric pain [R10.13]    • Nausea [R11.0]    • Olecranon bursitis of right elbow [M70.21]    • Pelvic adhesions [N73.6]    • Pelvic pain [R10.2]    • Rectal bleeding [K62.5]    • Family history of GI malignancy [Z80.0]    • Gastroesophageal reflux disease [K21.9]    • Pain of upper abdomen [R10.10]    • Leukocytosis [D72.829]    • FH: melanoma [Z80.8]    • GERD (gastroesophageal reflux disease) [K21.9]    • Chronic  diarrhea [K52.9]    • Chronic abdominal pain [R10.9, G89.29]          PAST MEDICAL HISTORY  Past Medical History:   Diagnosis Date   • Anxiety    • Asthma    • Colon polyp    • Depression    • Endometriosis    • GERD (gastroesophageal reflux disease)    • Hard to intubate    • Hiatal hernia    • Hiatal hernia    • Hyperlipidemia    • Hypertension    • Injury of back    • Leg pain    • Leukocytosis, likely related to tobacco use    • Liver cyst    • Melanoma (CMS/HCC)     Superficial spreading lentiginous, right lower extremity   • Migraines    • PONV (postoperative nausea and vomiting)          SURGICAL HISTORY  Past Surgical History:   Procedure Laterality Date   • APPENDECTOMY     •  SECTION     • CHOLECYSTECTOMY     • COLONOSCOPY     • COLONOSCOPY N/A 2019    Procedure: COLONOSCOPY;  Surgeon: Jazmyn Cullen MD;  Location: [facility] OR;  Service: Gastroenterology   • ENDOSCOPY N/A 2018    Procedure: ESOPHAGOGASTRODUODENOSCOPY WITH BIOPSIES;  Surgeon: Jazmyn Cullen MD;  Location: [facility] OR;  Service: Gastroenterology   • ENDOSCOPY N/A 10/1/2019    Procedure: ESOPHAGOGASTRODUODENOSCOPY with biopsy;  Surgeon: Martha Hawkins DO;  Location: [facility] OR;  Service: Gastroenterology   • ENDOSCOPY N/A 2020    Procedure: ESOPHAGOGASTRODUODENOSCOPY;  Surgeon: Yunier Salinas MD;  Location: [facility] OR;  Service: Gastroenterology;  Laterality: N/A;  Reflux esophagitis  Distal esophagus biopsy   • EXTERNAL EAR SURGERY     • HEMORRHOIDECTOMY     • HYSTERECTOMY      partial   • HYSTEROSCOPY ENDOMETRIAL ABLATION     • KNEE SURGERY     • OVARIAN CYST REMOVAL Left     Benign tumor   • OVARY SURGERY Left    • PELVIC LAPAROSCOPY     • SKIN BIOPSY     • SKIN CANCER EXCISION Right     Leg melanoma   • TUBAL ABDOMINAL LIGATION           FAMILY HISTORY  Family History   Problem Relation Age of Onset   • Cervical cancer Mother 26   • Skin cancer Mother 54        Basal cell   • Diabetes Mother    •  "Colon polyps Mother    • Kidney disease Mother    • Miscarriages / Stillbirths Mother    • Skin cancer Father 53        Non-melanoma   • Heart disease Father    • Hypertension Father    • Stroke Father    • Other Daughter         Enlarged spleen   • Migraines Daughter    • Brain cancer Other 60   • Pancreatic cancer Other    • Diabetes Other    • Heart disease Other    • Hypertension Other    • Colon cancer Maternal Uncle    • Breast cancer Maternal Grandmother    • Diabetes Maternal Grandmother    • Heart disease Paternal Grandmother    • Stroke Paternal Grandfather    • Breast cancer Paternal Aunt          SOCIAL HISTORY  Social History     Occupational History     Employer: UNEMPLOYED   Tobacco Use   • Smoking status: Former Smoker     Packs/day: 0.50     Years: 10.00     Pack years: 5.00     Types: Cigarettes     Start date: 1997   • Smokeless tobacco: Never Used   Vaping Use   • Vaping Use: Some days   Substance and Sexual Activity   • Alcohol use: Not Currently     Comment: occassionaly, monthly   • Drug use: No   • Sexual activity: Yes     Partners: Male         LAST RESULTS  See also labs reviewed above.   Lab on 04/08/2021   Component Date Value Ref Range Status   • FSH 04/08/2021 1.99  mIU/mL Final   • LH 04/08/2021 2.88  mIU/mL Final   • Prolactin 04/08/2021 13.90  4.79 - 23.30 ng/mL Final     No results found.    PHYSICAL EXAM  Debilities/Disabilities Identified: None  Emotional Behavior: Appropriate  40 y.o. female  Wt Readings from Last 3 Encounters:   08/05/21 97.5 kg (215 lb)   04/08/21 98.8 kg (217 lb 12.8 oz)   02/23/21 93 kg (205 lb)     Ht Readings from Last 1 Encounters:   08/05/21 167.6 cm (66\")     Body mass index is 34.7 kg/m².  Vitals:    08/05/21 0924   BP: 110/80   BP Location: Left arm   Patient Position: Sitting   Cuff Size: Large Adult   Weight: 97.5 kg (215 lb)   Height: 167.6 cm (66\")     Vital signs reviewed.          Physical Exam  Vitals and nursing note reviewed.   Constitutional: "       Appearance: She is well-developed.   HENT:      Head: Normocephalic and atraumatic.   Eyes:      Conjunctiva/sclera: Conjunctivae normal.      Pupils: Pupils are equal, round, and reactive to light.   Cardiovascular:      Rate and Rhythm: Normal rate and regular rhythm.   Pulmonary:      Effort: Pulmonary effort is normal.      Breath sounds: Normal breath sounds.   Abdominal:      General: Bowel sounds are normal. There is no distension.      Palpations: Abdomen is soft.      Tenderness: There is abdominal tenderness in the right upper quadrant, epigastric area, left upper quadrant and left lower quadrant.   Musculoskeletal:         General: Normal range of motion.      Cervical back: Normal range of motion and neck supple.   Lymphadenopathy:      Cervical: No cervical adenopathy.   Skin:     General: Skin is warm and dry.   Neurological:      Mental Status: She is alert and oriented to person, place, and time.   Psychiatric:         Behavior: Behavior normal.           CLINICAL DATA REVIEWED   reviewed previous lab results and integrated with today's visit, reviewed notes from other physicians and/or last GI encounter, reviewed previous endoscopy results and available photos, reviewed surgical pathology results from previous biopsies      ASSESSMENT  Diagnoses and all orders for this visit:    Irritable bowel syndrome with diarrhea  -     amitriptyline (ELAVIL) 25 MG tablet; Take 1 tablet by mouth Every Night.  -     H. Pylori Antigen, Stool - Stool, Per Rectum; Future    Heartburn  -     amitriptyline (ELAVIL) 25 MG tablet; Take 1 tablet by mouth Every Night.  -     famotidine (PEPCID) 40 MG tablet; Take 1 tablet by mouth 2 (Two) Times a Day. With lunch and at bedtime    Epigastric pain  -     amitriptyline (ELAVIL) 25 MG tablet; Take 1 tablet by mouth Every Night.  -     famotidine (PEPCID) 40 MG tablet; Take 1 tablet by mouth 2 (Two) Times a Day. With lunch and at bedtime    Gastroesophageal reflux  "disease with esophagitis without hemorrhage  -     amitriptyline (ELAVIL) 25 MG tablet; Take 1 tablet by mouth Every Night.  -     famotidine (PEPCID) 40 MG tablet; Take 1 tablet by mouth 2 (Two) Times a Day. With lunch and at bedtime    History of Helicobacter pylori infection  -     amitriptyline (ELAVIL) 25 MG tablet; Take 1 tablet by mouth Every Night.  -     famotidine (PEPCID) 40 MG tablet; Take 1 tablet by mouth 2 (Two) Times a Day. With lunch and at bedtime  -     H. Pylori Antigen, Stool - Stool, Per Rectum; Future          PLAN  Return in about 2 months (around 10/5/2021).     Discussed the importance of taking Omeprazole/Prilosec, 40mg twice daily before breakfast and dinner permanently due to known risk with long term acid reflux of Mchugh's esophagus/esophageal cancer., In addition, take famotidine (Pepcid) 40mg twice a day.     Hpylori stool sample today (hx HP x 2 in the past)    Amitriptyline 25mg at bedtime every night for IBS,  This is in addition to your zoloft and trazedone so if you get symptoms stop it immediately as discussed.     Low Acid Diet Instructions:   Don't eat late, don't eat fried or spicy, and don't eat too much at one time. Avoid alcohol and  tomato based products like pizza, lasagna, spaghetti.  If you want to have these take an over the counter Pepcid or TUMS immediately before you eat them.  Do not eat within 3-4 hrs of bedtime. Limit caffeine and carbonated beverages, if you must have them do not have later in the day.  If reflux is severe elevate the head of the bed. You may also use TUMS, maalox, or mylanta for breakthrough heartburn.    Take a daily probiotic (something with a billion cultures and more different strains is better, examples like \"Probiotic 10\" or probiotic gummies) for your gut as well.  AVOID taking NSAIDS (like ibuprofen, Aleve, Motrin, naproxen, meloxicam, etc) as much as possible and use acetaminophen (Tylenol) instead.    Drink lots of water, eat a " high fiber diet with 25-30gm a day(check the fiber content in the foods you eat.  Protein bars with 15gm of fiber in each bar are a great supplement daily), and get regular exercise. Use over the counter simethicone xtra strength gas x (125-180mg) 2 twice a day as needed for gas.     High Fiber Food suggestions:  Beans, nuts, vegetables, whole grains, flax seed and shanna seeds (which can be stirred into other food) are high in fiber.    Ruth Protein bars from [employer] = 10gm of fiber in each bar (also gluten free)  Quest Protein bars from grocery stores [employer], [employer], [employer]= 15gm of fiber each (also gluten free)  Fiber One bars from grocery stores = 5-6gm of fiber each  Kelloggs Bran Buds cereal 1/2 cup = 17gm of fiber  [employer] brand low sugar Craisins = 13gm of fiber per serving  Melalueca Fiberwise powder found on [employer]=15gm fiber per serving (2 scoops)  CarbBalance tortillas= 15gm of fiber each  Natural Ovens Keto-Friendly Bread found at [employer]=12gm fiber per serving  No Chavez vegan bar at [employer]=15gm fiber per serving      I have discussed the above plan with the patient.  They verbalize understanding and are in agreement with the plan.  They have been advised to contact the office for any questions, concerns, or changes related to their health.    32 min spent with patient today >50% spent counseling about natural history and expected course of assessed complaint and reviewed treatment options that have been tried and not tried and those currently available. Questions answered.

## 2021-08-05 NOTE — PATIENT INSTRUCTIONS
"Discussed the importance of taking Omeprazole/Prilosec, 40mg twice daily before breakfast and dinner permanently due to known risk with long term acid reflux of Mchugh's esophagus/esophageal cancer., In addition, take famotidine (Pepcid) 40mg twice a day.     Hpylori stool sample today (hx HP x 2 in the past)    Amitriptyline 25mg at bedtime every night for IBS,  This is in addition to your zoloft and trazedone so if you get symptoms stop it immediately as discussed.     Low Acid Diet Instructions:   Don't eat late, don't eat fried or spicy, and don't eat too much at one time. Avoid alcohol and  tomato based products like pizza, lasagna, spaghetti.  If you want to have these take an over the counter Pepcid or TUMS immediately before you eat them.  Do not eat within 3-4 hrs of bedtime. Limit caffeine and carbonated beverages, if you must have them do not have later in the day.  If reflux is severe elevate the head of the bed. You may also use TUMS, maalox, or mylanta for breakthrough heartburn.    Take a daily probiotic (something with a billion cultures and more different strains is better, examples like \"Probiotic 10\" or probiotic gummies) for your gut as well.  AVOID taking NSAIDS (like ibuprofen, Aleve, Motrin, naproxen, meloxicam, etc) as much as possible and use acetaminophen (Tylenol) instead.    Drink lots of water, eat a high fiber diet with 25-30gm a day(check the fiber content in the foods you eat.  Protein bars with 15gm of fiber in each bar are a great supplement daily), and get regular exercise. Use over the counter simethicone xtra strength gas x (125-180mg) 2 twice a day as needed for gas.     High Fiber Food suggestions:  Beans, nuts, vegetables, whole grains, flax seed and shanna seeds (which can be stirred into other food) are high in fiber.    Ruth Protein bars from [employer] = 10gm of fiber in each bar (also gluten free)  Quest Protein bars from grocery stores [employer], [place], [place]= 15gm of fiber " each (also gluten free)  Fiber One bars from grocery stores = 5-6gm of fiber each  Kelloggs Bran Buds cereal 1/2 cup = 17gm of fiber  Kroger brand low sugar Craisins = 13gm of fiber per serving  Melalueca Fiberwise powder found on [employer]=15gm fiber per serving (2 scoops)  CarbBalance tortillas= 15gm of fiber each  Natural Ovens Keto-Friendly Bread found at [facility]=12gm fiber per serving  No Chavez vegan bar at [facility]=15gm fiber per serving

## 2021-08-11 ENCOUNTER — LAB (OUTPATIENT)
Dept: LAB | Facility: HOSPITAL | Age: 41
End: 2021-08-11

## 2021-08-11 ENCOUNTER — CONSULT (OUTPATIENT)
Dept: ONCOLOGY | Facility: CLINIC | Age: 41
End: 2021-08-11

## 2021-08-11 VITALS
SYSTOLIC BLOOD PRESSURE: 112 MMHG | RESPIRATION RATE: 16 BRPM | WEIGHT: 214.3 LBS | DIASTOLIC BLOOD PRESSURE: 76 MMHG | BODY MASS INDEX: 35.7 KG/M2 | HEART RATE: 70 BPM | HEIGHT: 65 IN | OXYGEN SATURATION: 97 % | TEMPERATURE: 98.4 F

## 2021-08-11 DIAGNOSIS — D72.829 LEUKOCYTOSIS, UNSPECIFIED TYPE: Primary | ICD-10-CM

## 2021-08-11 LAB
BASOPHILS # BLD AUTO: 0.08 10*3/MM3 (ref 0–0.2)
BASOPHILS NFR BLD AUTO: 0.6 % (ref 0–1.5)
DEPRECATED RDW RBC AUTO: 41.9 FL (ref 37–54)
EOSINOPHIL # BLD AUTO: 0.24 10*3/MM3 (ref 0–0.4)
EOSINOPHIL NFR BLD AUTO: 1.9 % (ref 0.3–6.2)
ERYTHROCYTE [DISTWIDTH] IN BLOOD BY AUTOMATED COUNT: 12.9 % (ref 12.3–15.4)
HCT VFR BLD AUTO: 40.8 % (ref 34–46.6)
HGB BLD-MCNC: 13.8 G/DL (ref 12–15.9)
IMM GRANULOCYTES # BLD AUTO: 0.07 10*3/MM3 (ref 0–0.05)
IMM GRANULOCYTES NFR BLD AUTO: 0.6 % (ref 0–0.5)
LYMPHOCYTES # BLD AUTO: 2.57 10*3/MM3 (ref 0.7–3.1)
LYMPHOCYTES NFR BLD AUTO: 20.6 % (ref 19.6–45.3)
MCH RBC QN AUTO: 30.1 PG (ref 26.6–33)
MCHC RBC AUTO-ENTMCNC: 33.8 G/DL (ref 31.5–35.7)
MCV RBC AUTO: 88.9 FL (ref 79–97)
MONOCYTES # BLD AUTO: 0.69 10*3/MM3 (ref 0.1–0.9)
MONOCYTES NFR BLD AUTO: 5.5 % (ref 5–12)
NEUTROPHILS NFR BLD AUTO: 70.8 % (ref 42.7–76)
NEUTROPHILS NFR BLD AUTO: 8.85 10*3/MM3 (ref 1.7–7)
NRBC BLD AUTO-RTO: 0 /100 WBC (ref 0–0.2)
PLATELET # BLD AUTO: 264 10*3/MM3 (ref 140–450)
PMV BLD AUTO: 9.8 FL (ref 6–12)
RBC # BLD AUTO: 4.59 10*6/MM3 (ref 3.77–5.28)
WBC # BLD AUTO: 12.5 10*3/MM3 (ref 3.4–10.8)

## 2021-08-11 PROCEDURE — 99212 OFFICE O/P EST SF 10 MIN: CPT | Performed by: INTERNAL MEDICINE

## 2021-08-11 PROCEDURE — 85025 COMPLETE CBC W/AUTO DIFF WBC: CPT

## 2021-08-11 RX ORDER — ALBUTEROL SULFATE 90 UG/1
18 AEROSOL, METERED RESPIRATORY (INHALATION)
COMMUNITY
Start: 2021-07-27

## 2021-08-11 RX ORDER — GABAPENTIN 300 MG/1
300 CAPSULE ORAL 3 TIMES DAILY
COMMUNITY
End: 2022-10-26

## 2021-08-11 RX ORDER — ATORVASTATIN CALCIUM 80 MG/1
80 TABLET, FILM COATED ORAL DAILY
COMMUNITY
Start: 2021-07-31

## 2021-08-11 RX ORDER — MAGNESIUM OXIDE 400 MG/1
400 TABLET ORAL
COMMUNITY
Start: 2021-08-04

## 2021-08-11 RX ORDER — BUPROPION HYDROCHLORIDE 300 MG/1
300 TABLET ORAL
COMMUNITY
Start: 2021-08-05 | End: 2022-10-26

## 2021-08-11 RX ORDER — OFLOXACIN 3 MG/ML
1 SOLUTION/ DROPS OPHTHALMIC 4 TIMES DAILY
COMMUNITY

## 2021-08-11 RX ORDER — ACETAMINOPHEN AND CODEINE PHOSPHATE 300; 30 MG/1; MG/1
1 TABLET ORAL EVERY 4 HOURS PRN
COMMUNITY
End: 2022-10-26

## 2021-08-11 RX ORDER — KETOROLAC TROMETHAMINE 5 MG/ML
4 SOLUTION OPHTHALMIC
COMMUNITY
Start: 2021-06-03 | End: 2022-09-28

## 2021-08-11 NOTE — PROGRESS NOTES
History:     Reason for follow up:   1.  Chronic neutrophilia       HPI:  mEmy Dubon  is seen today for follow-up of chronic leukocytosis.  The patient has history of melanoma followed by Dr. Lieberman.  She has history of irritable bowel syndrome and H. pylori followed by GI.  She has history of tobacco use but quit smoking about 6 months ago and now using e-cigarettes.  She complains of fatigue.  She complains of left-sided neck and throat discomfort/pain and is followed by ENT.    Past Medical   Past Medical History:   Diagnosis Date   • Anxiety    • Arthritis    • Asthma    • Colon polyp    • Depression    • Endometriosis    • GERD (gastroesophageal reflux disease)    • Hard to intubate    • Hiatal hernia    • Hiatal hernia    • Hyperlipidemia    • Hypertension    • Injury of back    • Leg pain    • Leukocytosis, likely related to tobacco use    • Liver cyst    • Melanoma (CMS/HCC)     Superficial spreading lentiginous, right lower extremity   • Migraines    • PONV (postoperative nausea and vomiting)     and   Patient Active Problem List   Diagnosis   • Leukocytosis   • Gastroesophageal reflux disease   • Pain of upper abdomen   • Rectal bleeding   • Family history of GI malignancy   • Olecranon bursitis of right elbow   • Epigastric pain   • Nausea   • Chondromalacia of patellofemoral joint, right   • Intractable migraine without aura and without status migrainosus   • GERD with esophagitis   • Irritable bowel syndrome with diarrhea   • History of Helicobacter pylori infection   • Dysphagia   • Abnormal loss of weight   • Adaptive colitis   • Arthralgia of right elbow   • Bleeding hemorrhoid   • Blood in feces   • D (diarrhea)   • Degeneration of lumbosacral intervertebral disc   • Endometriosis   • Muscle pain   • Pelvic adhesions   • Pelvic pain   • FH: melanoma   • GERD (gastroesophageal reflux disease)   • Chronic diarrhea   • Chronic abdominal pain   • Fibromyalgia   • Olecranon bursitis of left elbow      Social History   Social History     Socioeconomic History   • Marital status:      Spouse name: Not on file   • Number of children: 2   • Years of education: 12   • Highest education level: Not on file   Tobacco Use   • Smoking status: Former Smoker     Packs/day: 0.50     Years: 10.00     Pack years: 5.00     Types: Cigarettes     Start date: 1997   • Smokeless tobacco: Never Used   Vaping Use   • Vaping Use: Some days   Substance and Sexual Activity   • Alcohol use: Not Currently     Comment: occassionaly, monthly   • Drug use: No   • Sexual activity: Yes     Partners: Male     Family History  Family History   Problem Relation Age of Onset   • Cervical cancer Mother 26   • Skin cancer Mother 54        Basal cell   • Diabetes Mother    • Colon polyps Mother    • Kidney disease Mother    • Miscarriages / Stillbirths Mother    • Cancer Mother    • Hypertension Mother    • Gallbladder disease Mother         gall stones   • Skin cancer Father 53        Non-melanoma   • Heart disease Father    • Hypertension Father    • Stroke Father    • Cancer Father    • Other Daughter         Enlarged spleen   • Migraines Daughter    • Brain cancer Other 60   • Pancreatic cancer Other    • Diabetes Other    • Heart disease Other    • Hypertension Other    • Colon cancer Maternal Uncle    • Breast cancer Maternal Grandmother    • Diabetes Maternal Grandmother    • Heart disease Paternal Grandmother    • Stroke Paternal Grandfather    • Breast cancer Paternal Aunt    • Leukemia Maternal Grandfather      Allergies  Allergies   Allergen Reactions   • Lisinopril Anaphylaxis   • Morphine Anaphylaxis, Hives, Shortness Of Breath and Other (See Comments)   • Tramadol Anaphylaxis     UNKNOWN   • Bactrim [Sulfamethoxazole-Trimethoprim] Hives and Swelling   • Hydrocodone Hives   • Lyrica [Pregabalin] Unknown - Low Severity   • Meperidine Hives and Other (See Comments)   • Niacin Hives   • Other Swelling     Erythromycin ophthalmic  "  • Erythromycin Hives and Rash       Medications: The current medication list was reviewed in the EMR.    Review of Systems  As per the HPI        Objective    Objective:     Vitals:    08/11/21 0910   BP: 112/76   Pulse: 70   Resp: 16   Temp: 98.4 °F (36.9 °C)   TempSrc: Infrared   SpO2: 97%   Weight: 97.2 kg (214 lb 4.8 oz)   Height: 165 cm (64.96\")   PainSc: 0-No pain     GENERAL:  Well-developed, well-nourished female in no acute distress.   SKIN:  Warm, dry without purpura or petechiae.  LYMPHATICS:  No cervical, supraclavicular, axillary adenopathy.  CHEST:  Lungs clear to percussion and auscultation. Good airflow.  CARDIAC:  Regular rate and rhythm without murmurs, rubs or gallops. Normal S1,S2. No edema  PSYCHIATRIC: Normal mood, anxious affect    Labs and Imaging  Results for orders placed or performed in visit on 08/11/21   CBC Auto Differential    Specimen: Blood   Result Value Ref Range    WBC 12.50 (H) 3.40 - 10.80 10*3/mm3    RBC 4.59 3.77 - 5.28 10*6/mm3    Hemoglobin 13.8 12.0 - 15.9 g/dL    Hematocrit 40.8 34.0 - 46.6 %    MCV 88.9 79.0 - 97.0 fL    MCH 30.1 26.6 - 33.0 pg    MCHC 33.8 31.5 - 35.7 g/dL    RDW 12.9 12.3 - 15.4 %    RDW-SD 41.9 37.0 - 54.0 fl    MPV 9.8 6.0 - 12.0 fL    Platelets 264 140 - 450 10*3/mm3    Neutrophil % 70.8 42.7 - 76.0 %    Lymphocyte % 20.6 19.6 - 45.3 %    Monocyte % 5.5 5.0 - 12.0 %    Eosinophil % 1.9 0.3 - 6.2 %    Basophil % 0.6 0.0 - 1.5 %    Immature Grans % 0.6 (H) 0.0 - 0.5 %    Neutrophils, Absolute 8.85 (H) 1.70 - 7.00 10*3/mm3    Lymphocytes, Absolute 2.57 0.70 - 3.10 10*3/mm3    Monocytes, Absolute 0.69 0.10 - 0.90 10*3/mm3    Eosinophils, Absolute 0.24 0.00 - 0.40 10*3/mm3    Basophils, Absolute 0.08 0.00 - 0.20 10*3/mm3    Immature Grans, Absolute 0.07 (H) 0.00 - 0.05 10*3/mm3    nRBC 0.0 0.0 - 0.2 /100 WBC       Assessment/Plan   Assessment/Plan:   1.  Chronic leukocytosis/neutrophilia: I explained to the patient that this is a nonspecific finding but " likely reactive to her other medical conditions and not a true bone marrow disorder given the stability and chronicity.  She has mild elevation of mature neutrophils without significant increase in immature cells or increase in lymphocytes.  She has no anemia or thrombocytopenia.  As before, my suspicion for a myeloproliferative process is low.  I recommended continued observation with her PCP and if her blood cell counts change I would be happy to reevaluate.    2. 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   Followed closely by dermatology every 6 months    Recommendations:  1.  Continue observation with a CBC 2-3 times a year with her PCP; rerefer to hematology if changes in the blood counts

## 2021-10-05 DIAGNOSIS — K21.00 GERD WITH ESOPHAGITIS: ICD-10-CM

## 2021-10-05 DIAGNOSIS — K58.0 IRRITABLE BOWEL SYNDROME WITH DIARRHEA: ICD-10-CM

## 2021-10-06 RX ORDER — OMEPRAZOLE 40 MG/1
CAPSULE, DELAYED RELEASE ORAL
Qty: 180 CAPSULE | Refills: 3 | Status: SHIPPED | OUTPATIENT
Start: 2021-10-06 | End: 2022-10-26

## 2021-10-12 RX ORDER — IBUPROFEN 800 MG/1
TABLET ORAL
Qty: 90 TABLET | Refills: 0 | Status: SHIPPED | OUTPATIENT
Start: 2021-10-12 | End: 2021-11-10

## 2021-11-10 RX ORDER — IBUPROFEN 800 MG/1
TABLET ORAL
Qty: 90 TABLET | Refills: 0 | Status: SHIPPED | OUTPATIENT
Start: 2021-11-10 | End: 2021-12-03

## 2021-12-03 RX ORDER — IBUPROFEN 800 MG/1
TABLET ORAL
Qty: 90 TABLET | Refills: 0 | Status: SHIPPED | OUTPATIENT
Start: 2021-12-03 | End: 2022-01-04

## 2021-12-06 RX ORDER — SUCRALFATE 1 G/1
TABLET ORAL
Qty: 120 TABLET | Refills: 2 | Status: SHIPPED | OUTPATIENT
Start: 2021-12-06 | End: 2022-03-07

## 2022-01-04 RX ORDER — IBUPROFEN 800 MG/1
TABLET ORAL
Qty: 90 TABLET | Refills: 0 | Status: SHIPPED | OUTPATIENT
Start: 2022-01-04 | End: 2022-02-08

## 2022-02-08 RX ORDER — IBUPROFEN 800 MG/1
TABLET ORAL
Qty: 90 TABLET | Refills: 0 | Status: SHIPPED | OUTPATIENT
Start: 2022-02-08 | End: 2022-03-07

## 2022-02-08 NOTE — TELEPHONE ENCOUNTER
Rx Refill Note  Requested Prescriptions     Pending Prescriptions Disp Refills    ibuprofen (ADVIL,MOTRIN) 800 MG tablet [Pharmacy Med Name: IBUPROFEN 800MG TABLETS] 90 tablet 0     Sig: TAKE 1 TABLET BY MOUTH THREE TIMES DAILY EVERY 8 HOURS      Last office visit with prescribing clinician: 2/23/2021      Next office visit with prescribing clinician: Visit date not found   Last Filled: 1.6.22      1. Olecranon bursitis of left elbow (Primary)   Date of Surgery: n/a      Katherine Julien MA  02/08/22, 07:13 EST    {TIP  Encounters:    {TIP  Please add Last Relevant Lab Date if appropriate:  {TIP  Is Refill Pharmacy correct?:

## 2022-03-07 RX ORDER — SUCRALFATE 1 G/1
TABLET ORAL
Qty: 120 TABLET | Refills: 2 | Status: SHIPPED | OUTPATIENT
Start: 2022-03-07 | End: 2022-06-13

## 2022-03-07 RX ORDER — IBUPROFEN 800 MG/1
TABLET ORAL
Qty: 90 TABLET | Refills: 0 | Status: SHIPPED | OUTPATIENT
Start: 2022-03-07 | End: 2022-04-04

## 2022-03-07 NOTE — TELEPHONE ENCOUNTER
Rx Refill Note  Requested Prescriptions     Pending Prescriptions Disp Refills   • ibuprofen (ADVIL,MOTRIN) 800 MG tablet [Pharmacy Med Name: IBUPROFEN 800MG TABLETS] 90 tablet 0     Sig: TAKE 1 TABLET BY MOUTH THREE TIMES DAILY EVERY 8 HOURS      Last office visit with prescribing clinician: 2/23/2021      Next office visit with prescribing clinician: Visit date not found   Last Filled:02/08/2022      DX Olecranon bursitis of left elbow    Tameka Dubon MA  03/07/22, 16:15 EST    {TIP  Encounters:    {TIP  Please add Last Relevant Lab Date if appropriate:  {TIP  Is Refill Pharmacy correct?:

## 2022-04-04 RX ORDER — IBUPROFEN 800 MG/1
TABLET ORAL
Qty: 90 TABLET | Refills: 0 | Status: SHIPPED | OUTPATIENT
Start: 2022-04-04 | End: 2022-05-08

## 2022-04-04 NOTE — TELEPHONE ENCOUNTER
Rx Refill Note  Requested Prescriptions     Pending Prescriptions Disp Refills    ibuprofen (ADVIL,MOTRIN) 800 MG tablet [Pharmacy Med Name: IBUPROFEN 800MG TABLETS] 90 tablet 0     Sig: TAKE 1 TABLET BY MOUTH THREE TIMES DAILY EVERY 8 HOURS      Last office visit with prescribing clinician: 2/23/2021      Next office visit with prescribing clinician: Visit date not found   Last Filled:03.07.2022      DX: Olecranon bursitis of left elbow         Marquita Ravi MA  04/04/22, 12:01 EDT    {TIP  Encounters:    {TIP  Please add Last Relevant Lab Date if appropriate:  {TIP  Is Refill Pharmacy correct?:

## 2022-04-11 ENCOUNTER — TELEPHONE (OUTPATIENT)
Dept: GASTROENTEROLOGY | Facility: CLINIC | Age: 42
End: 2022-04-11

## 2022-04-11 NOTE — TELEPHONE ENCOUNTER
The request has been approved. The authorization is effective for a maximum of 12 fills from 04/11/2022 to 04/10/2023, as long as the member is enrolled in their current health plan. The request was approved with a quantity restriction. This has been approved for a max daily dosage of 2. A written notification letter will follow with additional details.

## 2022-05-06 NOTE — TELEPHONE ENCOUNTER
Rx Refill Note  Requested Prescriptions     Pending Prescriptions Disp Refills    ibuprofen (ADVIL,MOTRIN) 800 MG tablet [Pharmacy Med Name: IBUPROFEN 800MG TABLETS] 90 tablet 0     Sig: TAKE 1 TABLET BY MOUTH THREE TIMES DAILY EVERY 8 HOURS      Last office visit with prescribing clinician: 2/23/2021      Next office visit with prescribing clinician: Visit date not found   Last Filled:04.04.2022    DX: Olecranon bursitis of left elbow      Marquita Ravi MA  05/06/22, 09:31 EDT    {TIP  Encounters:    {TIP  Please add Last Relevant Lab Date if appropriate:  {TIP  Is Refill Pharmacy correct?:

## 2022-05-08 RX ORDER — IBUPROFEN 800 MG/1
TABLET ORAL
Qty: 90 TABLET | Refills: 0 | OUTPATIENT
Start: 2022-05-08 | End: 2022-09-28

## 2022-05-12 ENCOUNTER — TELEPHONE (OUTPATIENT)
Dept: ONCOLOGY | Facility: CLINIC | Age: 42
End: 2022-05-12

## 2022-05-12 NOTE — TELEPHONE ENCOUNTER
Caller: BALDOMERO     Relationship: [facility]        What was the call regarding:     NEEDING TO SCHEDULE FOLLOW UP APPOINTMENT FOR PATIENT NEXT AVAILABLE.     BUT WAS NOT CERTAIN WHY NEEDING THE FOLLOW UP     BALDOMERO WILL CHECK WITH THE DOCTOR AND CALL BACK IF NEED TO SCHEDULE  FOLLOW UP

## 2022-06-11 DIAGNOSIS — K21.00 GASTROESOPHAGEAL REFLUX DISEASE WITH ESOPHAGITIS WITHOUT HEMORRHAGE: ICD-10-CM

## 2022-06-11 DIAGNOSIS — R10.13 EPIGASTRIC PAIN: ICD-10-CM

## 2022-06-11 DIAGNOSIS — R12 HEARTBURN: ICD-10-CM

## 2022-06-11 DIAGNOSIS — Z86.19 HISTORY OF HELICOBACTER PYLORI INFECTION: ICD-10-CM

## 2022-06-13 RX ORDER — FAMOTIDINE 40 MG/1
TABLET, FILM COATED ORAL
Qty: 180 TABLET | Refills: 0 | Status: SHIPPED | OUTPATIENT
Start: 2022-06-13 | End: 2022-09-20

## 2022-06-13 RX ORDER — SUCRALFATE 1 G/1
TABLET ORAL
Qty: 120 TABLET | Refills: 0 | Status: SHIPPED | OUTPATIENT
Start: 2022-06-13 | End: 2022-10-26 | Stop reason: SDUPTHER

## 2022-07-18 RX ORDER — GALCANEZUMAB 120 MG/ML
INJECTION, SOLUTION SUBCUTANEOUS
Qty: 1 ML | Refills: 11 | Status: SHIPPED | OUTPATIENT
Start: 2022-07-18 | End: 2022-07-19 | Stop reason: SDUPTHER

## 2022-07-19 ENCOUNTER — TELEMEDICINE (OUTPATIENT)
Dept: NEUROLOGY | Facility: CLINIC | Age: 42
End: 2022-07-19

## 2022-07-19 DIAGNOSIS — R93.0 ABNORMAL MRI OF THE HEAD: Primary | ICD-10-CM

## 2022-07-19 DIAGNOSIS — G43.019 INTRACTABLE MIGRAINE WITHOUT AURA AND WITHOUT STATUS MIGRAINOSUS: ICD-10-CM

## 2022-07-19 PROCEDURE — 99213 OFFICE O/P EST LOW 20 MIN: CPT | Performed by: NURSE PRACTITIONER

## 2022-07-19 RX ORDER — GALCANEZUMAB 120 MG/ML
120 INJECTION, SOLUTION SUBCUTANEOUS
Qty: 1 ML | Refills: 11 | Status: SHIPPED | OUTPATIENT
Start: 2022-07-19

## 2022-07-19 NOTE — PROGRESS NOTES
Subjective   Emmy Dubon is a 41 y.o. female presenting for follow-up.  My last visit with the patient was on April 8, 2021.  She has a history of migraine headaches and is taking Emgality 120 mg every 30 days.  This works well for her.  She had an MRI of her brain that revealed asymmetry of the pituitary gland.  I ordered a repeat MRI of her pituitary and that was completed in October 2020.  Imaging was normal at that time.  Pituitary labs were also normal.  The patient did report continued lactation since the birth of her daughter 14 years ago.  Prolactin level is normal.  I recommended she discuss this with her primary care and possibly see endocrinology.  She states that she saw a breast surgeon and was told she has several tumors, she is unsure what kind.  She states she has an appointment with the surgeon again next month.    She continues to do well on Emgality and is requesting a refill today.    I performed this clinical encounter by utilizing a real-time telehealth video connection between my location and the patient's location at home.  I obtained verbal consent from the patient to perform this clinical encounter utilizing video and prepared the patient by answering any questions they had about the telehealth interaction.      History of Present Illness     The following portions of the patient's history were reviewed and updated as appropriate: allergies, current medications, past family history, past medical history, past social history, past surgical history and problem list.    Review of Systems   Constitutional: Negative.    HENT: Negative.    Respiratory: Negative.    Cardiovascular: Negative.    Genitourinary: Positive for breast discharge, urgency and urinary incontinence.   Allergic/Immunologic: Negative.    Neurological: Positive for headache.   Psychiatric/Behavioral: Positive for stress.       Objective   Physical Exam  Mental status: Awake, alert, oriented, conversant.  HCF: Recent and remote  memory intact. Knowledge of recent events intact.     Limited exam due to this being a video visit.     Assessment & Plan        Diagnoses and all orders for this visit:    1. Abnormal MRI of the head (Primary)  -     MRI pituitary w wo contrast; Future    2. Intractable migraine without aura and without status migrainosus    Other orders  -     galcanezumab-gnlm (Emgality) 120 MG/ML auto-injector pen; Inject 1 mL under the skin into the appropriate area as directed Every 30 (Thirty) Days.  Dispense: 1 mL; Refill: 11        Her headaches are well controlled on Emgality.  This prescription was renewed today.  I ordered a repeat MRI of her pituitary.  If this looks normal I do not think any further imaging is necessary.  She will follow-up in 6 weeks once MRI has been completed.

## 2022-07-26 ENCOUNTER — TRANSCRIBE ORDERS (OUTPATIENT)
Dept: ADMINISTRATIVE | Facility: HOSPITAL | Age: 42
End: 2022-07-26

## 2022-07-26 DIAGNOSIS — Z12.31 SCREENING MAMMOGRAM FOR HIGH-RISK PATIENT: Primary | ICD-10-CM

## 2022-08-01 ENCOUNTER — TELEPHONE (OUTPATIENT)
Dept: GASTROENTEROLOGY | Facility: CLINIC | Age: 42
End: 2022-08-01

## 2022-08-09 ENCOUNTER — TELEPHONE (OUTPATIENT)
Dept: GASTROENTEROLOGY | Facility: CLINIC | Age: 42
End: 2022-08-09

## 2022-08-09 ENCOUNTER — HOSPITAL ENCOUNTER (OUTPATIENT)
Dept: MRI IMAGING | Facility: HOSPITAL | Age: 42
Discharge: HOME | End: 2022-08-09

## 2022-08-09 ENCOUNTER — HOSPITAL ENCOUNTER (OUTPATIENT)
Dept: MAMMOGRAPHY | Facility: HOSPITAL | Age: 42
Discharge: HOME | End: 2022-08-09

## 2022-08-09 DIAGNOSIS — R93.0 ABNORMAL MRI OF THE HEAD: ICD-10-CM

## 2022-08-09 DIAGNOSIS — Z12.31 SCREENING MAMMOGRAM FOR HIGH-RISK PATIENT: ICD-10-CM

## 2022-08-09 LAB — CREAT BLDA-MCNC: 0.9 MG/DL (ref 0.6–1.3)

## 2022-08-09 PROCEDURE — 82565 ASSAY OF CREATININE: CPT

## 2022-08-09 PROCEDURE — 77067 SCR MAMMO BI INCL CAD: CPT

## 2022-08-09 PROCEDURE — 77063 BREAST TOMOSYNTHESIS BI: CPT

## 2022-08-09 PROCEDURE — A9577 INJ MULTIHANCE: HCPCS | Performed by: NURSE PRACTITIONER

## 2022-08-09 PROCEDURE — 0 GADOBENATE DIMEGLUMINE 529 MG/ML SOLUTION: Performed by: NURSE PRACTITIONER

## 2022-08-09 PROCEDURE — 70553 MRI BRAIN STEM W/O & W/DYE: CPT

## 2022-08-09 RX ADMIN — GADOBENATE DIMEGLUMINE 16 ML: 529 INJECTION, SOLUTION INTRAVENOUS at 09:40

## 2022-08-09 NOTE — TELEPHONE ENCOUNTER
Patient has missed several appts.  Scheduled 12/5/22 for follow up.  She is c/o rectal blood occasionally with wiping and rectal pressure. HX of hemorrhoids.  Is requesting RX for hydrocortizone cream.  [facility]

## 2022-08-22 RX ORDER — IBUPROFEN 800 MG/1
TABLET ORAL
Qty: 90 TABLET | Refills: 0 | OUTPATIENT
Start: 2022-08-22

## 2022-08-22 NOTE — TELEPHONE ENCOUNTER
Rx Refill Note  Requested Prescriptions     Pending Prescriptions Disp Refills    ibuprofen (ADVIL,MOTRIN) 800 MG tablet [Pharmacy Med Name: Ibuprofen 800 MG Oral Tablet] 90 tablet 0     Sig: Take 1 tablet by mouth three times daily as needed      Last office visit with prescribing clinician:02.23.21 Visit date not found      Next office visit with prescribing clinician: Visit date not found   Last Filled:05.08.22      No diagnosis found. Olecranon bursitis of left elbow  Date of Surgery:      Morelia Barbour MA  08/22/22, 09:29 EDT    {TIP  Encounters:    {TIP  Please add Last Relevant Lab Date if appropriate:  {TIP  Is Refill Pharmacy correct?:

## 2022-08-26 ENCOUNTER — TELEPHONE (OUTPATIENT)
Dept: NEUROLOGY | Facility: CLINIC | Age: 42
End: 2022-08-26

## 2022-08-29 ENCOUNTER — TELEPHONE (OUTPATIENT)
Dept: NEUROLOGY | Facility: CLINIC | Age: 42
End: 2022-08-29

## 2022-09-19 DIAGNOSIS — R12 HEARTBURN: ICD-10-CM

## 2022-09-19 DIAGNOSIS — K21.00 GASTROESOPHAGEAL REFLUX DISEASE WITH ESOPHAGITIS WITHOUT HEMORRHAGE: ICD-10-CM

## 2022-09-19 DIAGNOSIS — Z86.19 HISTORY OF HELICOBACTER PYLORI INFECTION: ICD-10-CM

## 2022-09-19 DIAGNOSIS — R10.13 EPIGASTRIC PAIN: ICD-10-CM

## 2022-09-20 ENCOUNTER — TELEPHONE (OUTPATIENT)
Dept: NEUROLOGY | Facility: CLINIC | Age: 42
End: 2022-09-20

## 2022-09-20 RX ORDER — FAMOTIDINE 40 MG/1
TABLET, FILM COATED ORAL
Qty: 180 TABLET | Refills: 0 | Status: SHIPPED | OUTPATIENT
Start: 2022-09-20 | End: 2022-10-26

## 2022-09-20 NOTE — TELEPHONE ENCOUNTER
Caller: Emmy Dubon    Relationship: Self    Best call back number: [phone]    What is the best time to reach you: ANY    Who are you requesting to speak with (clinical staff, provider,  specific staff member): APPLE    What was the call regarding:PATIENT TELEPHONED RE: APPT SCHED. ON THURS 9/22 @ 9:30AM. PATIENT HAS 2 STAPH INFECTIONS WITH OPEN WOUNDS & CUELLAR. SHE IS WANTING TO KNOW IF MD STILL WANTS HER TO KEEP THE APPT, CANCEL THE APPT, OR CONVERT TO TELEHEALTH    PLEASE CALL & ADVISE    THANK YOU

## 2022-09-23 ENCOUNTER — TELEMEDICINE (OUTPATIENT)
Dept: NEUROLOGY | Facility: CLINIC | Age: 42
End: 2022-09-23

## 2022-09-23 DIAGNOSIS — R42 DIZZINESS: Primary | ICD-10-CM

## 2022-09-23 PROCEDURE — 99213 OFFICE O/P EST LOW 20 MIN: CPT | Performed by: NURSE PRACTITIONER

## 2022-09-23 NOTE — PROGRESS NOTES
Subjective   Emmy Dubon is a 42 y.o. female presenting for follow up. She has a history of migraine headaches. I had started her on Emgality and this improved her headaches quite a bit. She had an MRI brain in August due to a history of a pituitary microadenoma. This was unchanged on recent imaging. I reviewed this with her today.    She states today she has continued to have dizzy spells on an almost daily basis. This worsens with quick movements. She says when she bends over and stands back up she becomes very dizzy. She also describes a feeling of lightheadedness. She says this is sometimes triggered by standing as well as flashing lights. She fell down the stairs a few days ago.     She says she was taken off of several blood pressure medications but this did not help.     She also reports a history of memory difficulty. She was taken off of topiramate a few months ago and remained off of it for a few months but did not notice an improvement in dizziness or memory problems so she restarted this a few weeks ago. ,     I performed this clinical encounter by utilizing a real-time telehealth video connection between my location and the patient's location at home.  I obtained verbal consent from the patient to perform this clinical encounter utilizing video and prepared the patient by answering any questions they had about the telehealth interaction.         History of Present Illness     Review of Systems   Neurological: Positive for dizziness and confusion.       I have reviewed and confirmed the accuracy of the patient's history: Chief complaint, HPI, ROS, Subjective and Past Family Social History as entered by the MA/ESTEFANIN/RN. Feliberto Alcantara, APRN 09/23/22      Objective     Mental status: Awake, alert, oriented, conversant.  HCF: Recent and remote memory intact. Knowledge of recent events intact.     Limited exam due to this being a video visit.        Assessment & Plan   Diagnoses and all orders for this  visit:    1. Dizziness (Primary)  -     Basic Vestibular Evaluation; Future       Stopped topiramate all summer- did not help. Will refer for vestibular eval. If this is normal I'd recommend following up with her PCP regarding this. Possibly orthostatic in nature. Continue Emgality. Follow up after vestibular evaluation.

## 2022-09-27 ENCOUNTER — TELEPHONE (OUTPATIENT)
Dept: NEUROLOGY | Facility: CLINIC | Age: 42
End: 2022-09-27

## 2022-09-27 NOTE — TELEPHONE ENCOUNTER
Caller: ZOILA    Relationship: / [facility]    Best call back number: [phone]    What form or medical record are you requesting: LAST 2 OV NOTES- 7/19/22 & 9/23/22    How would you like to receive the form or medical records (pick-up, mail, fax): FAX TO PCP OFFICE  If fax, what is the fax number: [phone]    Timeframe paperwork needed: ASAP    Additional notes: [facility] CALLED TO REQUEST OFFICE FAX PT'S LAST 2 OV NOTES TO THEIR OFFICE FOR PCP TO REVIEW.    PLEASE REVIEW AND ADVISE.

## 2022-09-28 ENCOUNTER — HOSPITAL ENCOUNTER (EMERGENCY)
Facility: HOSPITAL | Age: 42
Discharge: HOME | End: 2022-09-28
Attending: EMERGENCY MEDICINE | Admitting: EMERGENCY MEDICINE

## 2022-09-28 ENCOUNTER — TELEPHONE (OUTPATIENT)
Dept: GASTROENTEROLOGY | Facility: CLINIC | Age: 42
End: 2022-09-28

## 2022-09-28 ENCOUNTER — APPOINTMENT (OUTPATIENT)
Dept: CT IMAGING | Facility: HOSPITAL | Age: 42
End: 2022-09-28

## 2022-09-28 VITALS
SYSTOLIC BLOOD PRESSURE: 109 MMHG | HEIGHT: 66 IN | TEMPERATURE: 98.5 F | OXYGEN SATURATION: 98 % | BODY MASS INDEX: 29.25 KG/M2 | WEIGHT: 182 LBS | RESPIRATION RATE: 17 BRPM | DIASTOLIC BLOOD PRESSURE: 72 MMHG | HEART RATE: 61 BPM

## 2022-09-28 DIAGNOSIS — K92.2 GASTROINTESTINAL HEMORRHAGE, UNSPECIFIED GASTROINTESTINAL HEMORRHAGE TYPE: Primary | ICD-10-CM

## 2022-09-28 LAB
ALBUMIN SERPL-MCNC: 4.3 G/DL (ref 3.5–5.2)
ALBUMIN/GLOB SERPL: 2 G/DL
ALP SERPL-CCNC: 79 U/L (ref 39–117)
ALT SERPL W P-5'-P-CCNC: 20 U/L (ref 1–33)
ANION GAP SERPL CALCULATED.3IONS-SCNC: 7.4 MMOL/L (ref 5–15)
AST SERPL-CCNC: 15 U/L (ref 1–32)
BASOPHILS # BLD AUTO: 0.07 10*3/MM3 (ref 0–0.2)
BASOPHILS NFR BLD AUTO: 0.6 % (ref 0–1.5)
BILIRUB SERPL-MCNC: 0.3 MG/DL (ref 0–1.2)
BILIRUB UR QL STRIP: NEGATIVE
BUN SERPL-MCNC: 9 MG/DL (ref 6–20)
BUN/CREAT SERPL: 10.6 (ref 7–25)
CALCIUM SPEC-SCNC: 9.5 MG/DL (ref 8.6–10.5)
CHLORIDE SERPL-SCNC: 105 MMOL/L (ref 98–107)
CLARITY UR: CLEAR
CO2 SERPL-SCNC: 25.6 MMOL/L (ref 22–29)
COLOR UR: YELLOW
CREAT SERPL-MCNC: 0.85 MG/DL (ref 0.57–1)
DEPRECATED RDW RBC AUTO: 42.9 FL (ref 37–54)
EGFRCR SERPLBLD CKD-EPI 2021: 88.9 ML/MIN/1.73
EOSINOPHIL # BLD AUTO: 0.33 10*3/MM3 (ref 0–0.4)
EOSINOPHIL NFR BLD AUTO: 2.9 % (ref 0.3–6.2)
ERYTHROCYTE [DISTWIDTH] IN BLOOD BY AUTOMATED COUNT: 13.1 % (ref 12.3–15.4)
GLOBULIN UR ELPH-MCNC: 2.1 GM/DL
GLUCOSE SERPL-MCNC: 83 MG/DL (ref 65–99)
GLUCOSE UR STRIP-MCNC: NEGATIVE MG/DL
HCT VFR BLD AUTO: 38.4 % (ref 34–46.6)
HGB BLD-MCNC: 12.8 G/DL (ref 12–15.9)
HGB UR QL STRIP.AUTO: NEGATIVE
IMM GRANULOCYTES # BLD AUTO: 0.06 10*3/MM3 (ref 0–0.05)
IMM GRANULOCYTES NFR BLD AUTO: 0.5 % (ref 0–0.5)
KETONES UR QL STRIP: ABNORMAL
LEUKOCYTE ESTERASE UR QL STRIP.AUTO: NEGATIVE
LYMPHOCYTES # BLD AUTO: 4.43 10*3/MM3 (ref 0.7–3.1)
LYMPHOCYTES NFR BLD AUTO: 38.8 % (ref 19.6–45.3)
MCH RBC QN AUTO: 30.1 PG (ref 26.6–33)
MCHC RBC AUTO-ENTMCNC: 33.3 G/DL (ref 31.5–35.7)
MCV RBC AUTO: 90.4 FL (ref 79–97)
MONOCYTES # BLD AUTO: 0.67 10*3/MM3 (ref 0.1–0.9)
MONOCYTES NFR BLD AUTO: 5.9 % (ref 5–12)
NEUTROPHILS NFR BLD AUTO: 5.86 10*3/MM3 (ref 1.7–7)
NEUTROPHILS NFR BLD AUTO: 51.3 % (ref 42.7–76)
NITRITE UR QL STRIP: NEGATIVE
NRBC BLD AUTO-RTO: 0 /100 WBC (ref 0–0.2)
PH UR STRIP.AUTO: 6.5 [PH] (ref 4.5–8)
PLATELET # BLD AUTO: 264 10*3/MM3 (ref 140–450)
PMV BLD AUTO: 10.2 FL (ref 6–12)
POTASSIUM SERPL-SCNC: 3.7 MMOL/L (ref 3.5–5.2)
PROT SERPL-MCNC: 6.4 G/DL (ref 6–8.5)
PROT UR QL STRIP: NEGATIVE
RBC # BLD AUTO: 4.25 10*6/MM3 (ref 3.77–5.28)
SODIUM SERPL-SCNC: 138 MMOL/L (ref 136–145)
SP GR UR STRIP: 1.02 (ref 1–1.03)
UROBILINOGEN UR QL STRIP: ABNORMAL
WBC NRBC COR # BLD: 11.42 10*3/MM3 (ref 3.4–10.8)

## 2022-09-28 PROCEDURE — 85025 COMPLETE CBC W/AUTO DIFF WBC: CPT | Performed by: EMERGENCY MEDICINE

## 2022-09-28 PROCEDURE — 0 IOPAMIDOL PER 1 ML: Performed by: EMERGENCY MEDICINE

## 2022-09-28 PROCEDURE — 74177 CT ABD & PELVIS W/CONTRAST: CPT

## 2022-09-28 PROCEDURE — 96374 THER/PROPH/DIAG INJ IV PUSH: CPT

## 2022-09-28 PROCEDURE — 81003 URINALYSIS AUTO W/O SCOPE: CPT | Performed by: EMERGENCY MEDICINE

## 2022-09-28 PROCEDURE — 99284 EMERGENCY DEPT VISIT MOD MDM: CPT

## 2022-09-28 PROCEDURE — 80053 COMPREHEN METABOLIC PANEL: CPT | Performed by: EMERGENCY MEDICINE

## 2022-09-28 PROCEDURE — 25010000002 FENTANYL CITRATE (PF) 50 MCG/ML SOLUTION: Performed by: EMERGENCY MEDICINE

## 2022-09-28 RX ORDER — FENTANYL CITRATE 50 UG/ML
25 INJECTION, SOLUTION INTRAMUSCULAR; INTRAVENOUS ONCE
Status: COMPLETED | OUTPATIENT
Start: 2022-09-28 | End: 2022-09-28

## 2022-09-28 RX ORDER — FENTANYL CITRATE 50 UG/ML
1 INJECTION, SOLUTION INTRAMUSCULAR; INTRAVENOUS ONCE
Status: DISCONTINUED | OUTPATIENT
Start: 2022-09-28 | End: 2022-09-28

## 2022-09-28 RX ORDER — SODIUM CHLORIDE 0.9 % (FLUSH) 0.9 %
10 SYRINGE (ML) INJECTION AS NEEDED
Status: DISCONTINUED | OUTPATIENT
Start: 2022-09-28 | End: 2022-09-28 | Stop reason: HOSPADM

## 2022-09-28 RX ADMIN — IOPAMIDOL 100 ML: 755 INJECTION, SOLUTION INTRAVENOUS at 19:09

## 2022-09-28 RX ADMIN — FENTANYL CITRATE 25 MCG: 50 INJECTION, SOLUTION INTRAMUSCULAR; INTRAVENOUS at 17:56

## 2022-10-24 ENCOUNTER — TELEPHONE (OUTPATIENT)
Dept: ONCOLOGY | Facility: CLINIC | Age: 42
End: 2022-10-24

## 2022-10-24 NOTE — TELEPHONE ENCOUNTER
Caller: Emmy Dubon    Relationship: Self    Best call back number: [phone]    What is the best time to reach you: ANYTIME    Who are you requesting to speak with (clinical staff, provider,  specific staff member): DR BARRERA OR NURSE    What was the call regarding: PT STATED THAT SHE HAS BEEN HAVING ISSUES WITH A HIGH WHITE COUNT AND MRSA INFECTIONS. PTS DERMATOLOGIST  RECOMMENDED THAT PT F/U WITH DR BARRERA. ALSO WANTED TO KNOW IF DR BARRERA WOULD REFER HER TO INF DISEASE OR WHAT HE THOUGHT PT SHOULD DO NEXT.      PLEASE CALL PT TO SCHED F/U APPT.     Do you require a callback: YES

## 2022-10-25 NOTE — TELEPHONE ENCOUNTER
Patient states she does not know what her counts were because her PCP went on leave but knows she had new labs drawn this month. I will request those from their office. She also reports MRSA and staph infection during hospital visit in September. She says her PCP referred her to a kidney, bladder, and heart doctor. She also says she was referred to a doctor for a cyst in pituitary gland. She reports always feeling tired.

## 2022-10-26 ENCOUNTER — OFFICE VISIT (OUTPATIENT)
Dept: GASTROENTEROLOGY | Facility: CLINIC | Age: 42
End: 2022-10-26

## 2022-10-26 VITALS
WEIGHT: 184.4 LBS | SYSTOLIC BLOOD PRESSURE: 110 MMHG | DIASTOLIC BLOOD PRESSURE: 80 MMHG | HEIGHT: 66 IN | BODY MASS INDEX: 29.63 KG/M2

## 2022-10-26 DIAGNOSIS — K58.0 IRRITABLE BOWEL SYNDROME WITH DIARRHEA: ICD-10-CM

## 2022-10-26 DIAGNOSIS — K21.00 GERD WITH ESOPHAGITIS: ICD-10-CM

## 2022-10-26 DIAGNOSIS — K64.9 HEMORRHOIDS, UNSPECIFIED HEMORRHOID TYPE: Primary | ICD-10-CM

## 2022-10-26 PROCEDURE — 99213 OFFICE O/P EST LOW 20 MIN: CPT

## 2022-10-26 RX ORDER — TIZANIDINE 4 MG/1
1 TABLET ORAL
COMMUNITY

## 2022-10-26 RX ORDER — CETIRIZINE HYDROCHLORIDE 10 MG/1
TABLET ORAL
COMMUNITY

## 2022-10-26 RX ORDER — FLUOCINOLONE ACETONIDE 0.11 MG/ML
OIL AURICULAR (OTIC)
COMMUNITY
Start: 2022-08-26

## 2022-10-26 RX ORDER — FAMOTIDINE 40 MG/1
40 TABLET, FILM COATED ORAL 2 TIMES DAILY
COMMUNITY

## 2022-10-26 RX ORDER — OMEPRAZOLE 40 MG/1
40 CAPSULE, DELAYED RELEASE ORAL 2 TIMES DAILY
Qty: 180 CAPSULE | Refills: 3 | Status: SHIPPED | OUTPATIENT
Start: 2022-10-26 | End: 2023-01-23 | Stop reason: SDUPTHER

## 2022-10-26 RX ORDER — DESVENLAFAXINE 25 MG/1
25 TABLET, EXTENDED RELEASE ORAL DAILY
COMMUNITY
Start: 2022-10-21

## 2022-10-26 RX ORDER — SUCRALFATE 1 G/1
1 TABLET ORAL 4 TIMES DAILY
Qty: 120 TABLET | Refills: 3 | Status: SHIPPED | OUTPATIENT
Start: 2022-10-26

## 2022-10-26 NOTE — PROGRESS NOTES
"    PATIENT INFORMATION  Emmy Dubon       - 1980    CHIEF COMPLAINT  Chief Complaint   Patient presents with   • Black or Bloody Stool   • Difficulty Swallowing       HISTORY OF PRESENT ILLNESS  Here today for GERD and IBS follow-up    22 ER for LLQ and bloody BM x2 days, CT Abd/Pelvis negative for acute GI process, but 3 CM Left adnexal lesion, CMP and CBC stable except elevated WBC. Saw OBGYN who was not concerned with ovarian lesion and said \"it was not big enough to cause pain.\" Always have skip a few days, then will have frequent BMs. Hemorrhoidectomy 16 years ago, difficulty holding bowel since. Bleeding with most BMs. Usually hydrocortisone after every BM. Last colon 2019 normal, brother with polyps at 14 years old. No fiber supplement, miralax too powerful. Rare NSAIDs, no opioids.    Last EGD 20 positive for reflux esophagitis.Throat sore at night. Always stays thirsty. Never feels hungry, always full. LUQ and epigastric pain worse at night and after meal. LLQ pain better with BM, but not epigastric pain. No dysphagia with meat, trouble swallowing bread and pills. Eats a lot of salads, vegetables. Vomits sometimes at night after eating, even in middle of night.    States she was in [facility] a few weeks ago for MRSA infection. Folliculitis, frequent antibiotics for the past 10 years for frequent skin issues.    Colon due in .    No improvement of IBS or GERD with elavil      REVIEWED PERTINENT RESULTS/ LABS  Lab Results   Component Value Date    CASEREPORT  2020     Surgical Pathology Report                         Case: [id number]                                  Authorizing Provider:  Yunier Salinas        Collected:           2020 12:48 PM                                 MD Opal                                                                   Ordering Location:     [facility]   Received:            2020 02:15 PM                 "                 OR                                                                           Pathologist:           Lisa Shook MD                                                    Specimen:    Esophagus, Distal                                                                          FINALDX  08/26/2020     1. Distal Esophagus, Biopsy:    A. Minimally inflamed gastroesophageal junction mucosa.   B. Negative for goblet cell metaplasia and dysplasia.    swm/jse        Lab Results   Component Value Date    HGB 12.8 09/28/2022    MCV 90.4 09/28/2022     09/28/2022    ALT 20 09/28/2022    AST 15 09/28/2022      CT Abdomen Pelvis With Contrast    Result Date: 9/28/2022  Narrative: CT Abdomen Pelvis W INDICATION: Left lower quadrant abdominal pain TECHNIQUE: CT of the abdomen and pelvis with IV contrast. Coronal and sagittal reconstructions were obtained.  Radiation dose reduction techniques included automated exposure control or exposure modulation based on body size. Count of known CT and cardiac nuc med studies performed in previous 12 months: 0. COMPARISON: CT abdomen and pelvis 3/11/2019. FINDINGS: Lower chest: Unremarkable. Liver: Normal density and contour. A few subcentimeter low density foci too small to characterize but likely benign. No solid mass. Gallbladder and bile ducts: Surgically absent gallbladder. No biliary dilatation. Spleen: Normal in size. Pancreas: Normally enhancing with no peripancreatic stranding. Adrenals: Unremarkable. Kidneys and ureters: Normal renal enhancement. No hydronephrosis. Bowel: Nondilated bowel with no wall thickening. Surgical sutures at the anorectum. Surgically absent appendix. Bladder: Unremarkable. Reproductive organs: Surgically absent uterus. 3 cm low density left adnexal lesion, likely cyst. Lymph nodes: Unremarkable. Peritoneum: Unremarkable. Vessels: Unremarkable. Abdominal wall: Unremarkable. Bones: Mild degenerative changes of the lumbar spine worst  at L5-S1.     Impression: 1.  No acute CT abnormality in the abdomen or pelvis. 2.  3 cm low-density left adnexal lesion, likely ovarian cyst. Signer Name: Shayne Cai MD  Signed: 9/28/2022 7:32 PM  Workstation Name: [id number]  [facility]      REVIEW OF SYSTEMS  Review of Systems   Constitutional: Negative for activity change, chills, fever and unexpected weight change.   HENT: Positive for trouble swallowing. Negative for congestion.    Eyes: Negative for visual disturbance.   Respiratory: Positive for cough, choking and shortness of breath.    Cardiovascular: Negative for chest pain and palpitations.   Gastrointestinal: Positive for abdominal distention, abdominal pain, anal bleeding, blood in stool, diarrhea and nausea.   Endocrine: Negative for cold intolerance and heat intolerance.   Genitourinary: Negative for hematuria.   Musculoskeletal: Negative for gait problem.   Skin: Negative for color change.   Allergic/Immunologic: Negative for immunocompromised state.   Neurological: Negative for weakness and light-headedness.   Hematological: Negative for adenopathy.   Psychiatric/Behavioral: Negative for sleep disturbance. The patient is not nervous/anxious.          ACTIVE PROBLEMS  Patient Active Problem List    Diagnosis    • Abnormal loss of weight [R63.4]    • Adaptive colitis [K58.9]    • Bleeding hemorrhoid [K64.9]    • Blood in feces [K92.1]    • D (diarrhea) [R19.7]    • Endometriosis [N80.9]    • Olecranon bursitis of left elbow [M70.22]    • Arthralgia of right elbow [M25.521]    • Dysphagia [R13.10]    • GERD with esophagitis [K21.00]    • Irritable bowel syndrome with diarrhea [K58.0]    • History of Helicobacter pylori infection [Z86.19]    • Fibromyalgia [M79.7]    • Degeneration of lumbosacral intervertebral disc [M51.37]    • Muscle pain [M79.10]    • Intractable migraine without aura and without status migrainosus [G43.019]    • Chondromalacia of patellofemoral joint, right  [M22.41]    • Epigastric pain [R10.13]    • Nausea [R11.0]    • Olecranon bursitis of right elbow [M70.21]    • Pelvic adhesions [N73.6]    • Pelvic pain [R10.2]    • Rectal bleeding [K62.5]    • Family history of GI malignancy [Z80.0]    • Gastroesophageal reflux disease [K21.9]    • Pain of upper abdomen [R10.10]    • Leukocytosis [D72.829]    • FH: melanoma [Z80.8]    • GERD (gastroesophageal reflux disease) [K21.9]    • Chronic diarrhea [K52.9]    • Chronic abdominal pain [R10.9, G89.29]          PAST MEDICAL HISTORY  Past Medical History:   Diagnosis Date   • Anxiety    • Arthritis    • Asthma    • Colon polyp    • Depression    • Endometriosis    • GERD (gastroesophageal reflux disease)    • Hard to intubate    • Hiatal hernia    • Hiatal hernia    • Hyperlipidemia    • Hypertension    • Injury of back    • Leg pain    • Leukocytosis, likely related to tobacco use    • Liver cyst    • Melanoma (HCC)     Superficial spreading lentiginous, right lower extremity   • Migraines    • PONV (postoperative nausea and vomiting)          SURGICAL HISTORY  Past Surgical History:   Procedure Laterality Date   • APPENDECTOMY     •  SECTION     • CHOLECYSTECTOMY     • COLONOSCOPY     • COLONOSCOPY N/A 2019    Procedure: COLONOSCOPY;  Surgeon: Jazmyn Cullen MD;  Location: [facility] OR;  Service: Gastroenterology   • ENDOSCOPY N/A 2018    Procedure: ESOPHAGOGASTRODUODENOSCOPY WITH BIOPSIES;  Surgeon: Jazmyn Cullen MD;  Location: [facility] OR;  Service: Gastroenterology   • ENDOSCOPY N/A 10/1/2019    Procedure: ESOPHAGOGASTRODUODENOSCOPY with biopsy;  Surgeon: Martha Hawkins DO;  Location: [facility] OR;  Service: Gastroenterology   • ENDOSCOPY N/A 2020    Procedure: ESOPHAGOGASTRODUODENOSCOPY;  Surgeon: Yunier Salinas MD;  Location: [facility] OR;  Service: Gastroenterology;  Laterality: N/A;  Reflux esophagitis  Distal esophagus biopsy   • EXTERNAL EAR SURGERY     • HEMORRHOIDECTOMY     •  HYSTERECTOMY      partial   • HYSTEROSCOPY ENDOMETRIAL ABLATION     • KNEE SURGERY     • OVARIAN CYST REMOVAL Left     Benign tumor   • OVARY SURGERY Left 2000   • PELVIC LAPAROSCOPY     • SKIN BIOPSY     • SKIN CANCER EXCISION Right     Leg melanoma   • TUBAL ABDOMINAL LIGATION           FAMILY HISTORY  Family History   Problem Relation Age of Onset   • Cervical cancer Mother 26   • Skin cancer Mother 54        Basal cell   • Diabetes Mother    • Colon polyps Mother    • Kidney disease Mother    • Miscarriages / Stillbirths Mother    • Cancer Mother    • Hypertension Mother    • Gallbladder disease Mother         gall stones   • Skin cancer Father 53        Non-melanoma   • Heart disease Father    • Hypertension Father    • Stroke Father    • Cancer Father    • Other Daughter         Enlarged spleen   • Migraines Daughter    • Brain cancer Other 60   • Pancreatic cancer Other    • Diabetes Other    • Heart disease Other    • Hypertension Other    • Colon cancer Maternal Uncle    • Breast cancer Maternal Grandmother    • Diabetes Maternal Grandmother    • Heart disease Paternal Grandmother    • Stroke Paternal Grandfather    • Breast cancer Paternal Aunt    • Leukemia Maternal Grandfather          SOCIAL HISTORY  Social History     Occupational History     Employer: UNEMPLOYED   Tobacco Use   • Smoking status: Former     Packs/day: 0.50     Years: 10.00     Pack years: 5.00     Types: Cigarettes     Start date: 1997   • Smokeless tobacco: Never   Vaping Use   • Vaping Use: Some days   Substance and Sexual Activity   • Alcohol use: Not Currently     Comment: occassionaly, monthly   • Drug use: No   • Sexual activity: Yes     Partners: Male         CURRENT MEDICATIONS    Current Outpatient Medications:   •  albuterol sulfate  (90 Base) MCG/ACT inhaler, Inhale 18 g., Disp: , Rfl:   •  amLODIPine (NORVASC) 10 MG tablet, Take 20 mg by mouth Daily., Disp: , Rfl:   •  atorvastatin (LIPITOR) 80 MG tablet, Take 80 mg  by mouth Daily., Disp: , Rfl:   •  Breo Ellipta 100-25 MCG/INH inhaler, INHALE 1 PUFF BY MOUTH EVERY DAY rinse MOUTH AFTER each use, Disp: , Rfl:   •  cetirizine (zyrTEC) 10 MG tablet, cetirizine 10 mg tablet, Disp: , Rfl:   •  Cyanocobalamin (VITAMIN B-12 PO), Take  by mouth., Disp: , Rfl:   •  Desvenlafaxine Succinate ER 25 MG tablet sustained-release 24 hour, Take 1 tablet by mouth Daily., Disp: , Rfl:   •  Diclofenac Sodium (VOLTAREN) 1 % gel gel, APPLY 4 GRAMS FOUR TIMES DAILY AS NEEDED FOR PAIN AS DIRECTED, Disp: 100 g, Rfl: 2  •  famotidine (PEPCID) 40 MG tablet, Take 1 tablet by mouth 2 (Two) Times a Day., Disp: , Rfl:   •  fluocinolone acetonide (DERMOTIC) 0.01 % oil otic oil, INSTILL 4-5 DROPS INTO LEFT EAR TWICE DAILY FOR 7 DAYS THEN 2 -3 TIMES WEEKLY INTO EACH EAR AS NEEDED, Disp: , Rfl:   •  galcanezumab-gnlm (Emgality) 120 MG/ML auto-injector pen, Inject 1 mL under the skin into the appropriate area as directed Every 30 (Thirty) Days., Disp: 1 mL, Rfl: 11  •  HM Saline Nasal Spray 0.65 % nasal spray, , Disp: , Rfl:   •  hydrocortisone 2.5 % ointment, Apply 1 application topically to the appropriate area as directed 2 (Two) Times a Day., Disp: 30 g, Rfl: 6  •  loratadine (CLARITIN) 10 MG tablet, Take 10 mg by mouth Daily., Disp: , Rfl: 2  •  magnesium oxide (MAG-OX) 400 MG tablet, Take 400 mg by mouth., Disp: , Rfl:   •  metoprolol succinate XL (TOPROL-XL) 25 MG 24 hr tablet, Take 25 mg by mouth Daily., Disp: , Rfl: 2  •  mupirocin (BACTROBAN) 2 % ointment, APPLY TOPICALLY TO RASH TWICE DAILY UNTIL RESOLVED, Disp: , Rfl:   •  nystatin (MYCOSTATIN) 100,000 unit/mL suspension, SWISH ANS SWALLOW 5 ML FOUR TIMES DAILY FOR 7 DAYS FOR THRUSH, Disp: , Rfl:   •  ofloxacin (OCUFLOX) 0.3 % ophthalmic solution, 1 drop 4 (Four) Times a Day., Disp: , Rfl:   •  omeprazole (priLOSEC) 40 MG capsule, Take 1 capsule by mouth 2 (Two) Times a Day., Disp: 180 capsule, Rfl: 3  •  ondansetron ODT (ZOFRAN-ODT) 4 MG  "disintegrating tablet, Place 1 tablet on the tongue Every 8 (Eight) Hours As Needed for Nausea or Vomiting., Disp: 45 tablet, Rfl: 3  •  sertraline (ZOLOFT) 100 MG tablet, Take 100 mg by mouth Daily., Disp: , Rfl:   •  sucralfate (CARAFATE) 1 g tablet, Take 1 tablet by mouth 4 (Four) Times a Day., Disp: 120 tablet, Rfl: 3  •  tiZANidine (ZANAFLEX) 4 MG tablet, Take 1 tablet by mouth., Disp: , Rfl:   •  topiramate (TOPAMAX) 25 MG tablet, Take 25 mg by mouth every night at bedtime., Disp: , Rfl:   •  vitamin D (ERGOCALCIFEROL) 76094 units capsule capsule, TAKE ONE (1) CAPSULE ORALLY (BY MOUTH) EVERY WEEK, Disp: , Rfl: 1    ALLERGIES  Lisinopril, Morphine, Tramadol, Bactrim [sulfamethoxazole-trimethoprim], Hydrocodone, Lyrica [pregabalin], Meperidine, Niacin, Other, and Erythromycin    VITALS  Vitals:    10/26/22 1341   BP: 110/80   BP Location: Left arm   Patient Position: Sitting   Cuff Size: Large Adult   Weight: 83.6 kg (184 lb 6.4 oz)   Height: 167.6 cm (66\")       PHYSICAL EXAM  Debilities/Disabilities Identified: None  Emotional Behavior: Appropriate  Wt Readings from Last 3 Encounters:   10/26/22 83.6 kg (184 lb 6.4 oz)   09/28/22 82.6 kg (182 lb)   08/09/22 81.6 kg (180 lb)     Ht Readings from Last 1 Encounters:   10/26/22 167.6 cm (66\")     Body mass index is 29.76 kg/m².  Physical Exam  Constitutional:       General: She is not in acute distress.     Appearance: Normal appearance. She is not ill-appearing.   HENT:      Head: Normocephalic and atraumatic.      Mouth/Throat:      Mouth: Mucous membranes are moist.      Pharynx: No posterior oropharyngeal erythema.   Eyes:      General: No scleral icterus.  Cardiovascular:      Rate and Rhythm: Normal rate and regular rhythm.      Pulses: Normal pulses.      Heart sounds: Normal heart sounds.   Pulmonary:      Effort: Pulmonary effort is normal.      Breath sounds: Normal breath sounds.   Abdominal:      General: Abdomen is flat. Bowel sounds are normal. There " is no distension.      Palpations: Abdomen is soft. There is no mass.      Tenderness: There is generalized abdominal tenderness. There is no guarding or rebound.      Hernia: No hernia is present.      Comments: Worse epi and RLQ   Musculoskeletal:      Cervical back: Neck supple.   Skin:     General: Skin is warm.      Capillary Refill: Capillary refill takes less than 2 seconds.   Neurological:      General: No focal deficit present.      Mental Status: She is alert.   Psychiatric:         Mood and Affect: Mood normal.         Behavior: Behavior normal.         Thought Content: Thought content normal.         Judgment: Judgment normal.         CLINICAL DATA REVIEWED   reviewed previous lab results and integrated with today's visit, reviewed notes from other physicians and/or last GI encounter, reviewed previous endoscopy results and available photos, reviewed surgical pathology results from previous biopsies    ASSESSMENT  Diagnoses and all orders for this visit:    Hemorrhoids, unspecified hemorrhoid type  -     hydrocortisone 2.5 % ointment; Apply 1 application topically to the appropriate area as directed 2 (Two) Times a Day.    GERD with esophagitis  -     omeprazole (priLOSEC) 40 MG capsule; Take 1 capsule by mouth 2 (Two) Times a Day.  -     sucralfate (CARAFATE) 1 g tablet; Take 1 tablet by mouth 4 (Four) Times a Day.  -     NM Gastric Emptying; Future    Irritable bowel syndrome with diarrhea  -     omeprazole (priLOSEC) 40 MG capsule; Take 1 capsule by mouth 2 (Two) Times a Day.  -     sucralfate (CARAFATE) 1 g tablet; Take 1 tablet by mouth 4 (Four) Times a Day.  -     NM Gastric Emptying; Future    Other orders  -     cetirizine (zyrTEC) 10 MG tablet; cetirizine 10 mg tablet  -     Desvenlafaxine Succinate ER 25 MG tablet sustained-release 24 hour; Take 1 tablet by mouth Daily.  -     fluocinolone acetonide (DERMOTIC) 0.01 % oil otic oil; INSTILL 4-5 DROPS INTO LEFT EAR TWICE DAILY FOR 7 DAYS THEN 2 -3  TIMES WEEKLY INTO EACH EAR AS NEEDED  -     mupirocin (BACTROBAN) 2 % ointment; APPLY TOPICALLY TO RASH TWICE DAILY UNTIL RESOLVED  -     nystatin (MYCOSTATIN) 100,000 unit/mL suspension; SWISH ANS SWALLOW 5 ML FOUR TIMES DAILY FOR 7 DAYS FOR THRUSH  -     tiZANidine (ZANAFLEX) 4 MG tablet; Take 1 tablet by mouth.  -     famotidine (PEPCID) 40 MG tablet; Take 1 tablet by mouth 2 (Two) Times a Day.          PLAN  Add 5g water soluble fiber daily. Use hydrocortisone cream as need.  Change med timing. PPI AM and bedtime, pepcid lunch and dinner, carafate QID PRN. Repeat GES with 4 hour exam.    Return in about 3 months (around 1/26/2023).    I have discussed the above plan with the patient.  They verbalize understanding and are in agreement with the plan.  They have been advised to contact the office for any questions, concerns, or changes related to their health.

## 2023-01-23 DIAGNOSIS — K21.00 GERD WITH ESOPHAGITIS: ICD-10-CM

## 2023-01-23 DIAGNOSIS — K58.0 IRRITABLE BOWEL SYNDROME WITH DIARRHEA: ICD-10-CM

## 2023-01-23 RX ORDER — OMEPRAZOLE 40 MG/1
40 CAPSULE, DELAYED RELEASE ORAL 2 TIMES DAILY
Qty: 180 CAPSULE | Refills: 0 | Status: SHIPPED | OUTPATIENT
Start: 2023-01-23

## 2023-05-09 ENCOUNTER — LAB (OUTPATIENT)
Dept: LAB | Facility: HOSPITAL | Age: 43
End: 2023-05-09
Payer: COMMERCIAL

## 2023-05-09 ENCOUNTER — OFFICE VISIT (OUTPATIENT)
Dept: NEUROLOGY | Facility: CLINIC | Age: 43
End: 2023-05-09
Payer: COMMERCIAL

## 2023-05-09 VITALS
OXYGEN SATURATION: 97 % | DIASTOLIC BLOOD PRESSURE: 68 MMHG | BODY MASS INDEX: 29.09 KG/M2 | WEIGHT: 181 LBS | SYSTOLIC BLOOD PRESSURE: 128 MMHG | HEART RATE: 95 BPM | HEIGHT: 66 IN

## 2023-05-09 DIAGNOSIS — R25.1 TREMOR: ICD-10-CM

## 2023-05-09 DIAGNOSIS — R41.82 ALTERED MENTAL STATUS, UNSPECIFIED ALTERED MENTAL STATUS TYPE: ICD-10-CM

## 2023-05-09 DIAGNOSIS — O92.70 LACTATION DISORDER: Primary | ICD-10-CM

## 2023-05-09 DIAGNOSIS — D72.829 LEUKOCYTOSIS, UNSPECIFIED TYPE: ICD-10-CM

## 2023-05-09 LAB
ALBUMIN SERPL-MCNC: 4.4 G/DL (ref 3.5–5.2)
ALBUMIN/GLOB SERPL: 2.1 G/DL
ALP SERPL-CCNC: 59 U/L (ref 39–117)
ALT SERPL W P-5'-P-CCNC: 12 U/L (ref 1–33)
ANION GAP SERPL CALCULATED.3IONS-SCNC: 9.3 MMOL/L (ref 5–15)
AST SERPL-CCNC: 18 U/L (ref 1–32)
BASOPHILS # BLD AUTO: 0.05 10*3/MM3 (ref 0–0.2)
BASOPHILS NFR BLD AUTO: 0.4 % (ref 0–1.5)
BILIRUB SERPL-MCNC: 0.2 MG/DL (ref 0–1.2)
BUN SERPL-MCNC: 10 MG/DL (ref 6–20)
BUN/CREAT SERPL: 12.5 (ref 7–25)
CALCIUM SPEC-SCNC: 9 MG/DL (ref 8.6–10.5)
CHLORIDE SERPL-SCNC: 104 MMOL/L (ref 98–107)
CO2 SERPL-SCNC: 23.7 MMOL/L (ref 22–29)
CREAT SERPL-MCNC: 0.8 MG/DL (ref 0.57–1)
DEPRECATED RDW RBC AUTO: 40.8 FL (ref 37–54)
EGFRCR SERPLBLD CKD-EPI 2021: 95.7 ML/MIN/1.73
EOSINOPHIL # BLD AUTO: 0.22 10*3/MM3 (ref 0–0.4)
EOSINOPHIL NFR BLD AUTO: 2 % (ref 0.3–6.2)
ERYTHROCYTE [DISTWIDTH] IN BLOOD BY AUTOMATED COUNT: 12.4 % (ref 12.3–15.4)
GLOBULIN UR ELPH-MCNC: 2.1 GM/DL
GLUCOSE SERPL-MCNC: 97 MG/DL (ref 65–99)
HCT VFR BLD AUTO: 41.4 % (ref 34–46.6)
HGB BLD-MCNC: 13.7 G/DL (ref 12–15.9)
IMM GRANULOCYTES # BLD AUTO: 0.04 10*3/MM3 (ref 0–0.05)
IMM GRANULOCYTES NFR BLD AUTO: 0.4 % (ref 0–0.5)
LYMPHOCYTES # BLD AUTO: 2.49 10*3/MM3 (ref 0.7–3.1)
LYMPHOCYTES NFR BLD AUTO: 22.4 % (ref 19.6–45.3)
MCH RBC QN AUTO: 30.1 PG (ref 26.6–33)
MCHC RBC AUTO-ENTMCNC: 33.1 G/DL (ref 31.5–35.7)
MCV RBC AUTO: 91 FL (ref 79–97)
MONOCYTES # BLD AUTO: 0.88 10*3/MM3 (ref 0.1–0.9)
MONOCYTES NFR BLD AUTO: 7.9 % (ref 5–12)
NEUTROPHILS NFR BLD AUTO: 66.9 % (ref 42.7–76)
NEUTROPHILS NFR BLD AUTO: 7.46 10*3/MM3 (ref 1.7–7)
NRBC BLD AUTO-RTO: 0 /100 WBC (ref 0–0.2)
PLATELET # BLD AUTO: 240 10*3/MM3 (ref 140–450)
PMV BLD AUTO: 10.5 FL (ref 6–12)
POTASSIUM SERPL-SCNC: 3.5 MMOL/L (ref 3.5–5.2)
PROT SERPL-MCNC: 6.5 G/DL (ref 6–8.5)
RBC # BLD AUTO: 4.55 10*6/MM3 (ref 3.77–5.28)
SODIUM SERPL-SCNC: 137 MMOL/L (ref 136–145)
WBC NRBC COR # BLD: 11.14 10*3/MM3 (ref 3.4–10.8)

## 2023-05-09 PROCEDURE — 85025 COMPLETE CBC W/AUTO DIFF WBC: CPT

## 2023-05-09 PROCEDURE — 36415 COLL VENOUS BLD VENIPUNCTURE: CPT | Performed by: NURSE PRACTITIONER

## 2023-05-09 PROCEDURE — 80053 COMPREHEN METABOLIC PANEL: CPT | Performed by: NURSE PRACTITIONER

## 2023-05-09 RX ORDER — ROPINIROLE 0.25 MG/1
TABLET, FILM COATED ORAL
COMMUNITY
Start: 2023-05-04

## 2023-05-09 NOTE — LETTER
May 9, 2023       No Recipients    Patient: Emmy Dubon   YOB: 1980   Date of Visit: 5/9/2023       Dear Dr. Vides Recipients:    Thank you for referring Emmy Dubon to me for evaluation. Below are the relevant portions of my assessment and plan of care.    If you have questions, please do not hesitate to call me. I look forward to following Emmy along with you.         Sincerely,        SHANELLE Flores        CC:   No Recipients    Feliberto Alcantara APRN  05/09/23 1656  Signed  Subjective    Emmy Dubon is a 42 y.o. female presenting for evaluation for episodes of altered mental status and tremor.  I have previously seen the patient for migraine headaches.  She is taking Emgality 120 mg every 30 days.  Initially she felt like this was quite helpful, but over the last few months has had an increase in the frequency of her migraine headaches.    She is accompanied today by a friend.  He does assist in providing the history for the spells as the patient is unable to recall what happens during these events.    The spells of been going on for a few months now.  They are triggered by flashing light, bright lights, loud noises.  She gives the example of the son flickering through trees, timers and lights beeping at work.  She works at [employer] and says that the beeping Fryer and the lights tend to trigger the spells.  This can happen anywhere from once or twice a day to several times a day.  This also occurs at home.  She has been off of work for the last week due to the spells and they have continued to occur on a daily basis.  She describes shaking of her hands and head. She denies any type of aura prior to onset. She denies any medication changes. Denies drug use.     She is not driving.    History of Present Illness     Review of Systems   HENT: Positive for drooling, hearing loss, sinus pressure and tinnitus.    Eyes: Positive for photophobia, itching and visual disturbance.    Cardiovascular: Positive for leg swelling.   Gastrointestinal: Positive for nausea. Negative for vomiting.   Musculoskeletal: Positive for back pain, joint swelling and neck pain.   Neurological: Positive for dizziness, tremors, seizures, speech difficulty, weakness, headache and confusion. Negative for syncope.   Psychiatric/Behavioral: Positive for decreased concentration, sleep disturbance and positive for hyperactivity. The patient is nervous/anxious.        I have reviewed and confirmed the accuracy of the patient's history: Chief complaint, HPI, ROS, Subjective and Past Family Social History as entered by the MA/LPN/RN. Petty Oleary MA 05/09/23      Objective      Physical Examination:  General Appearance:  Well developed, well nourished, well groomed, alert, and cooperative.  HEENT: Normocephalic.    Neck and Spine: Normal range of motion.  Normal alignment. No mass or tenderness. No bruits.  Cardiac: Regular rate and rhythm. No murmurs.  Peripheral Vasculature: Radial and pedal pulses are equal and symmetric.   Extremities: No edema or deformities. Normal joint ROM.  Skin: No rashes or birth marks.      Neurological examination:   Higher Integrative Function: Oriented to time, place, and person. Normal registration, recall attention span and concentration. Normal language including comprehension, spontaneous speech, repetition, reading, writing, naming and vocabulary. No neglect with normal visual-spatial function and construction. Normal fund of knowledge and higher integrative function.   CN II: Pupils are equal, round and reactive to light. Normal visual acuity and visual fields.   CN III IV VI: Extraocular movements are full without nystagmus.   CN V: Normal facial sensation and strength of muscles of mastication.   CN VII: Facial movements are symmetric. No weakness.   CN VIII: Auditory acuity is normal.  CN IX & X: Symmetric palatal movement.   CN XI: Sternocleidomastoid and trapezius are normal. No  weakness.   CN XII: The tongue is midline. No atrophy or fasciculations.  Motor: Normal muscle strength, bulk and tone in upper and lower extremities. No fasciculations, rigidity, spasticity, or abnormal movements.   Reflexes: 2+ in the upper and lower extremities. Plantar responses are flexor.   Sensation: Normal light touch, pinprick, vibration, temperature, and proprioception in the arms and legs.   Station and Gait: Normal gait and station.   Coordination: Finger to nose test shows no dysmetria. Rapid alternating movements are normal. Heel to shin is normal.           Assessment & Plan   Diagnoses and all orders for this visit:    1. Lactation disorder (Primary)  -     Ambulatory Referral to Endocrinology    2. Altered mental status, unspecified altered mental status type  -     MRI Brain With & Without Contrast; Future  -     Ambulatory EEG; Future  -     Comprehensive Metabolic Panel    3. Tremor  -     MRI Brain With & Without Contrast; Future  -     Ambulatory EEG; Future  -     Comprehensive Metabolic Panel    4. Leukocytosis, unspecified type  -     CBC & Differential; Future    The patient presents today with spells of altered mental status along with hand and head tremor.  This lasts anywhere from a few seconds to a few minutes.  Her friend who is with her today states that during this time she is unresponsive.  She states that she is able to hear and see what is going on around her but cannot respond.  I am uncertain as to what is causing the spells at this point and we will order an MRI brain as well as 48-hour ambulatory EEG.  I have advised her not to drive during this time and recommended that she remain off of work given that the lights and noises in her work environment trigger the spells quite frequently.  I also ordered a repeat CBC given the leukocytosis seen on CBC when she was in the hospital recently.  I reentered an order for endocrinology given her continued lactation despite not  breastfeeding in over 15 years. She continues to take Emgality for her migraines however this is no longer helping. We can discuss changing this at her next visit but at this time the spells of altered mental status and tremor take precedence.

## 2023-05-09 NOTE — PROGRESS NOTES
Subjective   Emmy Dubon is a 42 y.o. female presenting for evaluation for episodes of altered mental status and tremor.  I have previously seen the patient for migraine headaches.  She is taking Emgality 120 mg every 30 days.  Initially she felt like this was quite helpful, but over the last few months has had an increase in the frequency of her migraine headaches.    She is accompanied today by a friend.  He does assist in providing the history for the spells as the patient is unable to recall what happens during these events.    The spells of been going on for a few months now.  They are triggered by flashing light, bright lights, loud noises.  She gives the example of the son flickering through trees, timers and lights beeping at work.  She works at [email or web address] and says that the beeping Fryer and the lights tend to trigger the spells.  This can happen anywhere from once or twice a day to several times a day.  This also occurs at home.  She has been off of work for the last week due to the spells and they have continued to occur on a daily basis.  She describes shaking of her hands and head. She denies any type of aura prior to onset. She denies any medication changes. Denies drug use.     She is not driving.    History of Present Illness     Review of Systems   HENT: Positive for drooling, hearing loss, sinus pressure and tinnitus.    Eyes: Positive for photophobia, itching and visual disturbance.   Cardiovascular: Positive for leg swelling.   Gastrointestinal: Positive for nausea. Negative for vomiting.   Musculoskeletal: Positive for back pain, joint swelling and neck pain.   Neurological: Positive for dizziness, tremors, seizures, speech difficulty, weakness, headache and confusion. Negative for syncope.   Psychiatric/Behavioral: Positive for decreased concentration, sleep disturbance and positive for hyperactivity. The patient is nervous/anxious.        I have reviewed and confirmed the accuracy of the  patient's history: Chief complaint, HPI, ROS, Subjective and Past Family Social History as entered by the MA/ESTEFANIN/RN. Petty Oleary MA 05/09/23      Objective     Physical Examination:  General Appearance:  Well developed, well nourished, well groomed, alert, and cooperative.  HEENT: Normocephalic.    Neck and Spine: Normal range of motion.  Normal alignment. No mass or tenderness. No bruits.  Cardiac: Regular rate and rhythm. No murmurs.  Peripheral Vasculature: Radial and pedal pulses are equal and symmetric.   Extremities: No edema or deformities. Normal joint ROM.  Skin: No rashes or birth marks.      Neurological examination:   Higher Integrative Function: Oriented to time, place, and person. Normal registration, recall attention span and concentration. Normal language including comprehension, spontaneous speech, repetition, reading, writing, naming and vocabulary. No neglect with normal visual-spatial function and construction. Normal fund of knowledge and higher integrative function.   CN II: Pupils are equal, round and reactive to light. Normal visual acuity and visual fields.   CN III IV VI: Extraocular movements are full without nystagmus.   CN V: Normal facial sensation and strength of muscles of mastication.   CN VII: Facial movements are symmetric. No weakness.   CN VIII: Auditory acuity is normal.  CN IX & X: Symmetric palatal movement.   CN XI: Sternocleidomastoid and trapezius are normal. No weakness.   CN XII: The tongue is midline. No atrophy or fasciculations.  Motor: Normal muscle strength, bulk and tone in upper and lower extremities. No fasciculations, rigidity, spasticity, or abnormal movements.   Reflexes: 2+ in the upper and lower extremities. Plantar responses are flexor.   Sensation: Normal light touch, pinprick, vibration, temperature, and proprioception in the arms and legs.   Station and Gait: Normal gait and station.   Coordination: Finger to nose test shows no dysmetria. Rapid  alternating movements are normal. Heel to shin is normal.           Assessment & Plan   Diagnoses and all orders for this visit:    1. Lactation disorder (Primary)  -     Ambulatory Referral to Endocrinology    2. Altered mental status, unspecified altered mental status type  -     MRI Brain With & Without Contrast; Future  -     Ambulatory EEG; Future  -     Comprehensive Metabolic Panel    3. Tremor  -     MRI Brain With & Without Contrast; Future  -     Ambulatory EEG; Future  -     Comprehensive Metabolic Panel    4. Leukocytosis, unspecified type  -     CBC & Differential; Future    The patient presents today with spells of altered mental status along with hand and head tremor.  This lasts anywhere from a few seconds to a few minutes.  Her friend who is with her today states that during this time she is unresponsive.  She states that she is able to hear and see what is going on around her but cannot respond.  I am uncertain as to what is causing the spells at this point and we will order an MRI brain as well as 48-hour ambulatory EEG.  I have advised her not to drive during this time and recommended that she remain off of work given that the lights and noises in her work environment trigger the spells quite frequently.  I also ordered a repeat CBC given the leukocytosis seen on CBC when she was in the hospital recently.  I reentered an order for endocrinology given her continued lactation despite not breastfeeding in over 15 years. She continues to take Emgality for her migraines however this is no longer helping. We can discuss changing this at her next visit but at this time the spells of altered mental status and tremor take precedence.

## 2023-05-16 ENCOUNTER — TELEPHONE (OUTPATIENT)
Dept: NEUROLOGY | Facility: CLINIC | Age: 43
End: 2023-05-16
Payer: COMMERCIAL

## 2023-05-16 ENCOUNTER — ANCILLARY ORDERS (OUTPATIENT)
Dept: NEUROLOGY | Facility: HOSPITAL | Age: 43
End: 2023-05-16
Payer: COMMERCIAL

## 2023-05-16 DIAGNOSIS — R56.9 SEIZURE: ICD-10-CM

## 2023-05-16 NOTE — TELEPHONE ENCOUNTER
Provider: **  Caller: DIANA NAIDU  Relationship to Patient: SPOUSE  Phone Number: [phone]  Reason for Call: PATIENTS  CALLED PATIENT IS REQUESTING A CALL BACK WITH HER LAB RESULTS FROM 05/09/23.     PLEASE CALL AND ADVISE     THANK YOU

## 2023-06-07 ENCOUNTER — TELEPHONE (OUTPATIENT)
Dept: NEUROLOGY | Facility: CLINIC | Age: 43
End: 2023-06-07
Payer: COMMERCIAL

## 2023-06-07 ENCOUNTER — HOSPITAL ENCOUNTER (OUTPATIENT)
Dept: MRI IMAGING | Facility: HOSPITAL | Age: 43
Discharge: HOME | End: 2023-06-07
Payer: COMMERCIAL

## 2023-06-07 DIAGNOSIS — R41.82 ALTERED MENTAL STATUS, UNSPECIFIED ALTERED MENTAL STATUS TYPE: ICD-10-CM

## 2023-06-07 DIAGNOSIS — R25.1 TREMOR: ICD-10-CM

## 2023-06-07 NOTE — TELEPHONE ENCOUNTER
"----- Message from Adry Sevilla sent at 6/7/2023  9:40 AM EDT -----  Patient came by and said Emgality is not working and would like to try something else. Patient couldn't do MRI having sensitivity to flashing light and beeping noise. Increase in \"spells\" per patient. Making patient neck tight and tense. Please call [phone] (mother's phone).    "

## 2023-06-08 ENCOUNTER — TELEPHONE (OUTPATIENT)
Dept: NEUROLOGY | Facility: CLINIC | Age: 43
End: 2023-06-08
Payer: COMMERCIAL

## 2023-06-08 NOTE — TELEPHONE ENCOUNTER
"Patient was unable to complete her exam due to noise induced tremors. Would Feliberto David like to procedure done with Anesthesia, if so she would send new order adding \"With Anesthesia\" in scheduling instructions and route back to schedule.  Thank you.   "

## 2023-06-13 ENCOUNTER — TELEPHONE (OUTPATIENT)
Dept: NEUROLOGY | Facility: CLINIC | Age: 43
End: 2023-06-13
Payer: COMMERCIAL

## 2023-06-13 NOTE — TELEPHONE ENCOUNTER
Caller: Emmy Dubon     Relationship: SELF     Best call back number: [phone]    What is your medical concern? PT SAID SHE HAS FLARE UPS AROUND FLASHING LIGHTS AND THE LOUD NOISES DURING THE MRI PROCESS BUT WHEN SCHEDULING THEY SUGGESTED A PILL TO TAKE. SHE'S CONCERNED THAT WITH THE LIGHTS AND NOISES THE PILL WILL NOT HAVE MUCH EFFECT. SHE WOULD LIKE TO KNOW WHAT THE PROVIDER SUGGESTS BEFORE SCHEDULING THE MRI.     SHE DOES NOT WANT TO WASTE A TRIP IF THE PILL IS NOT GOING TO WORK.     PLEASE REVIEW AND ADVISE

## 2023-06-14 NOTE — TELEPHONE ENCOUNTER
I called patient to let her know Feliberto was out of the office until 6/20/2023. The number was out of order.   97

## 2023-06-15 ENCOUNTER — TELEPHONE (OUTPATIENT)
Dept: NEUROLOGY | Facility: CLINIC | Age: 43
End: 2023-06-15

## 2023-06-15 NOTE — TELEPHONE ENCOUNTER
Caller: ole lares    Relationship: Emergency Contact; SPOUSE    Best call back number: [phone]    What was the call regarding: PT'S  CALLED TO CHECK FOR UPDATES REGARDING CALL RELATED TO ISSUES COMPLETING MRI; SEE ENCOUNTER 6/13/23.    I ADVISED THAT BULMARO HAD ATTEMPTED TO RETURN PHONE, AND MADE AWARE THAT SHANELLE ROJO IS OUT UNTIL NEXT TUESDAY, 6/20/2023. PT'S  VERBALIZED UNDERSTANDING.    Do you require a callback: YES, PLEASE.    **CREATED NEW ENCOUNTER AS OTHER ENCOUNTER HAS ALREADY BEEN SIGNED/CLOSED.    PLEASE REVIEW AND ADVISE.

## 2023-06-20 NOTE — TELEPHONE ENCOUNTER
Spoke with patient's spouse and gave him information about Ct scan. He would have to have it done in [facility].

## 2023-08-09 NOTE — PROGRESS NOTES
"Chief Complaint  Establish Care (LACTATION DISORDER)      HPI:  Emmy Dubon presents to [facility] ENDOCRINOLOGY for \"lactation disorder\"  Hx intermittent breast discharge- notices if takes hot shower.  Ongoing since first child born 20 years ago (between 2 kids had issues, second child is 17).  Enough that bra sticks to nipples   Breasts tender frequently, up into axillae  Color of discharge  green or clear, not bloody  Not white,no milk, just discharge. Occ if ongoing will start to get a bit milkier but usually greenish/clear    Hasn't seen a breast specialist/surgeon  Had breast bx in radiology in 2019- benign  Had US and mammo in [facility]  Gets imaging annually- [facility]    Normal mammo except fibroadenoma   Hx Breast bx- fibroadenoma       Pituitary MRI     4.5 x 3.5 x 4 mm microadenoma    Weight- up and down   No abnormal striae  + bruising  A few stray dark hairs just on chin  A1- kids are 20, 17  S/p hysterectomy and R oopherectomy        Medical Records Reviewed:    Reviewed Gyne note   No breast dc on PE  Hx normal PRL    MRI pituitary 22  IMPRESSION:  1.  Focused evaluation of the pituitary/sella demonstrates a well-defined hypoenhancing focus in the left side of the pituitary measuring approximately 4.5 mm transverse by 3.5 mm craniocaudal by 4.0 m AP. The finding is most consistent with a pituitary  microadenoma. It is unchanged from prior. Correlate with endocrine labwork.  2.  No acute intracranial abnormality.  3.  Nonspecific white matter FLAIR signal hyperintensities with differential considerations as above.    Reviewed 22 prog note   Emmy Dubon is a 41 y.o. female presenting for follow-up. My last visit with the patient was on 2021. She has a history of migraine headaches and is taking Emgality 120 mg every 30 days. This works well for her. She had an MRI of her brain that revealed asymmetry of the pituitary gland. I ordered a repeat " MRI of her pituitary and that was completed in October 2020. Imaging was normal at that time. Pituitary labs were also normal. The patient did report continued lactation since the birth of her daughter 14 years ago. Prolactin level is normal. I recommended she discuss this with her primary care and possibly see endocrinology. She states that she saw a breast surgeon and was told she has several tumors, she is unsure what kind. She states she has an appointment with the surgeon again next month.      Latest Reference Range & Units 03/29/19 09:55 09/19/19 12:56 04/08/21 11:36   Growth Hormone 0.0 - 10.0 ng/mL 0.4     Insulin-Like Growth Factor-1 69 - 227 ng/mL 121     LH mIU/mL 6.86  2.88   FSH mIU/mL 6.64  1.99   Prolactin 4.79 - 23.30 ng/mL  12.30 13.90   TSH Baseline 0.270 - 4.200 mIU/mL 0.637     Free T4 0.93 - 1.70 ng/dL 0.93       8/9/22 Mammo  Narrative & Impression   EXAM, 08/09/2022:  1. Bilateral digital screening mammogram with CAD.  2. Bilateral digital screening breast tomosynthesis.     INDICATION:  Routine screening. Family history of breast cancer (grandmother, aunt).  Previous benign percutaneous right breast biopsy in 2019 (fibroadenoma).     TECHNIQUE:  Bilateral digital screening mammography including breast tomosynthesis  and CAD review.     COMPARISON:  *  Mammograms, 04/22/2021, 12/11/2019, 11/22/2019. Outside mammogram,  11/20/2018.     FINDINGS:  The breasts are heterogeneously dense, which may obscure small masses.  Small benign fibroadenoma in the medial right breast with biopsy clip  marker, unchanged since previous postbiopsy imaging. No significant  change when compared with prior images. No mammographic evidence of  malignancy. Recommend repeat screening mammogram in one year.     IMPRESSION:  Benign screening mammogram.     BI-RADS CATEGORY 2: Benign Findings.        Women over the age of 40 undergoing screening mammography are entered  into a reminder system with target due date for  the next mammogram.     This report was finalized on 2022 3:22 PM by Dr. Christian Manning,       Past Medical History:   Diagnosis Date    Anxiety     Arthritis     Asthma     Colon polyp     Depression     Endometriosis     GERD (gastroesophageal reflux disease)     Hard to intubate     Hiatal hernia     Hiatal hernia     Hyperlipidemia     Hypertension     Injury of back     Leg pain     Leukocytosis, likely related to tobacco use     Liver cyst     Melanoma     Superficial spreading lentiginous, right lower extremity    Migraines     PONV (postoperative nausea and vomiting)      Past Surgical History:   Procedure Laterality Date    APPENDECTOMY       SECTION      CHOLECYSTECTOMY      COLONOSCOPY      COLONOSCOPY N/A 2019    Procedure: COLONOSCOPY;  Surgeon: Jazmyn Cullen MD;  Location: [facility] OR;  Service: Gastroenterology    ENDOSCOPY N/A 2018    Procedure: ESOPHAGOGASTRODUODENOSCOPY WITH BIOPSIES;  Surgeon: Jazmyn Cullen MD;  Location: [facility] OR;  Service: Gastroenterology    ENDOSCOPY N/A 10/1/2019    Procedure: ESOPHAGOGASTRODUODENOSCOPY with biopsy;  Surgeon: Martha Hawkins DO;  Location: [facility] OR;  Service: Gastroenterology    ENDOSCOPY N/A 2020    Procedure: ESOPHAGOGASTRODUODENOSCOPY;  Surgeon: Yunier Salinas MD;  Location: [facility] OR;  Service: Gastroenterology;  Laterality: N/A;  Reflux esophagitis  Distal esophagus biopsy    EXTERNAL EAR SURGERY      HEMORRHOIDECTOMY      HYSTERECTOMY      partial    HYSTEROSCOPY ENDOMETRIAL ABLATION      KNEE SURGERY      OVARIAN CYST REMOVAL Left     Benign tumor    OVARY SURGERY Left 2000    PELVIC LAPAROSCOPY      SKIN BIOPSY      SKIN CANCER EXCISION Right     Leg melanoma    TUBAL ABDOMINAL LIGATION       Family History   Problem Relation Age of Onset    Cervical cancer Mother 26    Skin cancer Mother 54        Basal cell    Diabetes Mother     Colon polyps Mother     Kidney disease Mother     Miscarriages /  Stillbirths Mother     Cancer Mother     Hypertension Mother     Gallbladder disease Mother         gall stones    Skin cancer Father 53        Non-melanoma    Heart disease Father     Hypertension Father     Stroke Father     Cancer Father     Other Daughter         Enlarged spleen    Migraines Daughter     Brain cancer Other 60    Pancreatic cancer Other     Diabetes Other     Heart disease Other     Hypertension Other     Colon cancer Maternal Uncle     Breast cancer Maternal Grandmother     Diabetes Maternal Grandmother     Heart disease Paternal Grandmother     Stroke Paternal Grandfather     Breast cancer Paternal Aunt     Leukemia Maternal Grandfather      Social History     Socioeconomic History    Marital status:     Number of children: 2    Years of education: 12   Tobacco Use    Smoking status: Former     Packs/day: 0.50     Years: 10.00     Pack years: 5.00     Types: Cigarettes     Start date: 1997    Smokeless tobacco: Never   Vaping Use    Vaping Use: Some days   Substance and Sexual Activity    Alcohol use: Not Currently     Comment: occassionaly, monthly    Drug use: No    Sexual activity: Yes     Partners: Male         ROS: The following systems were specifically reviewed with patient:constitutional, ophtho, ENT, CV, pulm, GI, , musculoskeletal, derm, neuro, psych, heme-onc, allergy, and endocrine (other than HPI). Positive findings were: sz issues, dry skin      MEDICATIONS  Current Outpatient Medications   Medication Sig Dispense Refill    albuterol sulfate  (90 Base) MCG/ACT inhaler Inhale 200,000 puffs.      amLODIPine (NORVASC) 10 MG tablet Take 2 tablets by mouth Daily.      atorvastatin (LIPITOR) 80 MG tablet Take 1 tablet by mouth Daily.      Breo Ellipta 100-25 MCG/INH inhaler INHALE 1 PUFF BY MOUTH EVERY DAY rinse MOUTH AFTER each use      cetirizine (zyrTEC) 10 MG tablet cetirizine 10 mg tablet      omeprazole (priLOSEC) 40 MG capsule Take 1 capsule by mouth 2 (Two) Times  "a Day. 180 capsule 0    rOPINIRole (REQUIP) 0.25 MG tablet take one tablet by mouth once nightly 1 to 3 hours before bedtime      sertraline (ZOLOFT) 100 MG tablet Take 50 mg by mouth Daily.      topiramate (Topamax) 25 MG tablet Take 1 tablet by mouth Every Night. 30 tablet 2     No current facility-administered medications for this visit.           Physical Exam:  Vital Signs:  /78   Pulse 74   Temp 96.9 øF (36.1 øC)   Ht 167.6 cm (65.98\")   Wt 92.4 kg (203 lb 9.6 oz)   SpO2 97%   BMI 32.88 kg/mý   Estimated body mass index is 29.21 kg/mý as calculated from the following:    Height as of 5/9/23: 167.6 cm (66\").    Weight as of 5/9/23: 82.1 kg (181 lb).     General appearance - Well dev WF, alert, well appearing, and in no distress  Mental status - affect appropriate to mood  Neck - No masses, no thyromegaly or nodules  Lymphatics - no palpable cervical lymphadenopathy  Breasts- no masses, no expressible discharge  Chest - CTA, normal effort  Heart - normal rate and regular rhythm, no murmurs noted, no edema  Neurological - no tremors, normal gait  Skin - no acanthosis, no skin tags, no hirsutism        Assessment and Plan   Diagnoses and all orders for this visit:    1. Pituitary adenoma (Primary)  Comment:unlikely a prolactinoma given hx normal PRL in past  May be incidentaloma- d/w pt and SO  Plan:   Check 8 am fasting labs to eval pituitary function  Repeat MRI to confirm stability. Pt states recently unable to do an MRI- ongoing seizure issue and had activity in MRI.  Had CT head instead (No pituitary lesion noted)- recommend complete eval/rx of seizure issue and fu early 2024, can plan repeat imaging at that time if all has stabilized    -     Cortisol - AM; Future  -     ACTH; Future  -     Estradiol; Future  -     FSH & LH; Future  -     Insulin-like Growth Factor; Future  -     T4, Free; Future  -     TSH; Future  -     Comprehensive Metabolic Panel; Future  -      PRL, macroPRL    2. Breast " discharge  Comment:pt reports intermittent copious discharge that is green/clear, not typically milky  Chronic breast tenderness  Hx fibroadenoma  Normal prolactin  No expressible dc on exam  It is possible for patients to have ongoing chronic galactorrhea after delivery x years, but what patient is describing does not sound typical for this, with ongoing discomfort and intermittent green/clear discharge.   Plan:   Recommend fu with a surgical breast specialist to address this issue which doesn't seem to be hormonally mediated and is not clearly galactorrhea. R/o papillomas, ductal ectasia, etc. Given repeatedly normal prolactin levels this appears to be a separate issue from her pituitary lesion.       Plan of care reviewed with patient who verbalizes understanding and agrees.  Sparkle Snell MD FACE [facility]         I spent 69 minutes caring for Emmy on this date of service. This time includes time spent by me in the following activities:preparing for the visit, reviewing tests, obtaining and/or reviewing a separately obtained history, performing a medically appropriate examination and/or evaluation , counseling and educating the patient/family/caregiver, ordering medications, tests, or procedures, referring and communicating with other health care professionals , and documenting information in the medical record  Follow Up  Return in about 7 months (around 3/10/2024).  Patient was given instructions and counseling regarding her condition or for health maintenance advice. Please see specific information pulled into the AVS if appropriate.

## 2023-08-10 ENCOUNTER — OFFICE VISIT (OUTPATIENT)
Dept: ENDOCRINOLOGY | Age: 43
End: 2023-08-10
Payer: COMMERCIAL

## 2023-08-10 VITALS
TEMPERATURE: 96.9 F | WEIGHT: 203.6 LBS | DIASTOLIC BLOOD PRESSURE: 78 MMHG | BODY MASS INDEX: 32.72 KG/M2 | OXYGEN SATURATION: 97 % | SYSTOLIC BLOOD PRESSURE: 118 MMHG | HEIGHT: 66 IN | HEART RATE: 74 BPM

## 2023-08-10 DIAGNOSIS — N64.52 BREAST DISCHARGE: ICD-10-CM

## 2023-08-10 DIAGNOSIS — D35.2 PITUITARY ADENOMA: Primary | ICD-10-CM

## 2023-08-10 RX ORDER — CLONIDINE HYDROCHLORIDE 0.1 MG/1
0.1 TABLET ORAL
COMMUNITY
Start: 2023-06-27

## 2023-08-10 RX ORDER — SUCRALFATE 1 G/1
1 TABLET ORAL 2 TIMES DAILY
COMMUNITY

## 2023-08-10 RX ORDER — ACETAMINOPHEN AND CODEINE PHOSPHATE 300; 30 MG/1; MG/1
TABLET ORAL
COMMUNITY

## 2023-08-10 RX ORDER — FOLIC ACID 0.8 MG
500 TABLET ORAL
COMMUNITY

## 2023-08-10 NOTE — PATIENT INSTRUCTIONS
See breast surgeon for further evaluation of the discharge. What you describe does not sound like milk and there is no hormonal treatment I would recommend.   Ask your primary care physician and gynecologist for recommendations.     For the pituitary abnormality:  Return Fasting at 8 am for bloodwork. Nothing to eat or drink except water for 12 hours before your labs.  Go to a [facility] lab facility

## 2023-08-11 ENCOUNTER — TELEPHONE (OUTPATIENT)
Dept: ENDOCRINOLOGY | Age: 43
End: 2023-08-11
Payer: COMMERCIAL

## 2023-08-11 NOTE — TELEPHONE ENCOUNTER
Let pt know I put in a referral for breast surgery center here- Dr Duran for consult about her breast discharge and breast tenderness

## 2023-08-14 ENCOUNTER — TELEPHONE (OUTPATIENT)
Dept: SURGERY | Facility: CLINIC | Age: 43
End: 2023-08-14
Payer: COMMERCIAL

## 2023-08-15 ENCOUNTER — TELEPHONE (OUTPATIENT)
Dept: SURGERY | Facility: CLINIC | Age: 43
End: 2023-08-15
Payer: COMMERCIAL

## 2023-08-15 NOTE — TELEPHONE ENCOUNTER
New patient appointment scheduled with JANAY Lopez     8/28/2023 @ 1220 pm Arrival 15 minutes prior to appointment time.     New patient packet mailed. Left VM for patient to call back and confirm appointment date and time.

## 2024-03-02 ENCOUNTER — LAB (OUTPATIENT)
Dept: LAB | Facility: HOSPITAL | Age: 44
End: 2024-03-02
Payer: COMMERCIAL

## 2024-03-02 PROCEDURE — 84439 ASSAY OF FREE THYROXINE: CPT | Performed by: INTERNAL MEDICINE

## 2024-03-02 PROCEDURE — 82533 TOTAL CORTISOL: CPT | Performed by: INTERNAL MEDICINE

## 2024-03-02 PROCEDURE — 82024 ASSAY OF ACTH: CPT | Performed by: INTERNAL MEDICINE

## 2024-03-02 PROCEDURE — 84305 ASSAY OF SOMATOMEDIN: CPT | Performed by: INTERNAL MEDICINE

## 2024-03-02 PROCEDURE — 80053 COMPREHEN METABOLIC PANEL: CPT | Performed by: INTERNAL MEDICINE

## 2024-03-02 PROCEDURE — 82670 ASSAY OF TOTAL ESTRADIOL: CPT | Performed by: INTERNAL MEDICINE

## 2024-03-02 PROCEDURE — 83002 ASSAY OF GONADOTROPIN (LH): CPT | Performed by: INTERNAL MEDICINE

## 2024-03-02 PROCEDURE — 83001 ASSAY OF GONADOTROPIN (FSH): CPT | Performed by: INTERNAL MEDICINE

## 2024-03-02 PROCEDURE — 84146 ASSAY OF PROLACTIN: CPT | Performed by: INTERNAL MEDICINE

## 2024-03-11 ENCOUNTER — OFFICE VISIT (OUTPATIENT)
Dept: ENDOCRINOLOGY | Age: 44
End: 2024-03-11
Payer: COMMERCIAL

## 2024-03-11 VITALS
HEART RATE: 85 BPM | HEIGHT: 66 IN | SYSTOLIC BLOOD PRESSURE: 130 MMHG | BODY MASS INDEX: 28.73 KG/M2 | OXYGEN SATURATION: 98 % | WEIGHT: 178.8 LBS | DIASTOLIC BLOOD PRESSURE: 84 MMHG

## 2024-03-11 DIAGNOSIS — D35.2 PITUITARY ADENOMA: Primary | ICD-10-CM

## 2024-03-11 PROCEDURE — 1159F MED LIST DOCD IN RCRD: CPT | Performed by: INTERNAL MEDICINE

## 2024-03-11 PROCEDURE — 99213 OFFICE O/P EST LOW 20 MIN: CPT | Performed by: INTERNAL MEDICINE

## 2024-03-11 PROCEDURE — 1160F RVW MEDS BY RX/DR IN RCRD: CPT | Performed by: INTERNAL MEDICINE

## 2024-03-11 RX ORDER — GALCANEZUMAB 120 MG/ML
INJECTION, SOLUTION SUBCUTANEOUS
COMMUNITY
Start: 2024-02-19

## 2024-03-11 RX ORDER — BUSPIRONE HYDROCHLORIDE 5 MG/1
1 TABLET ORAL EVERY 12 HOURS SCHEDULED
COMMUNITY
Start: 2024-02-20

## 2024-03-11 RX ORDER — LANOLIN ALCOHOL/MO/W.PET/CERES
1 CREAM (GRAM) TOPICAL DAILY
COMMUNITY
Start: 2024-03-06

## 2024-03-11 NOTE — PROGRESS NOTES
Chief complaint/Reason for consult:  Pituitary microadenoma    HPI:   - 43 year old here for nonfunctional pituitary microadenoma  - Her last MRI was in 8/2022 and showed a 4.5 mm pituitary microadenoma  - She has had a partial hysterectomy  - She was previously having breast discharge but she is not currently having any breast discharge    The following portions of the patient's history were reviewed and updated as appropriate: allergies, current medications, past family history, past medical history, past social history, past surgical history, and problem list.    Objective     Vitals:    03/11/24 1043   BP: 130/84   Pulse: 85   SpO2: 98%        Physical Exam  Vitals reviewed.   Constitutional:       Appearance: Normal appearance.   HENT:      Head: Normocephalic and atraumatic.   Eyes:      General: No scleral icterus.  Pulmonary:      Effort: Pulmonary effort is normal. No respiratory distress.   Neurological:      Mental Status: She is alert.      Gait: Gait normal.   Psychiatric:         Mood and Affect: Mood normal.         Thought Content: Thought content normal.         Judgment: Judgment normal.         Labs/Imaging:  Prolactin, estradiol, IGF-1 were all normal on recent labs    Assessment & Plan   Nonfunctional pituitary microadenoma  - Will order follow-up MRI  - If her repeat MRI is stable she will not likely need additional follow-up  - Her pituitary hormone levels have been unremarkable

## 2024-04-26 ENCOUNTER — TELEPHONE (OUTPATIENT)
Dept: ENDOCRINOLOGY | Age: 44
End: 2024-04-26
Payer: COMMERCIAL

## 2024-04-26 NOTE — TELEPHONE ENCOUNTER
Caller: Estrellita Dubon    Relationship: Self    Best call back number: [phone]    Caller requesting test results: ESTRELLITA DUBON    What test was performed: MRI    When was the test performed: MARCH    Additional notes: PATIENT WOULD LIKE THE TEST RESULTS OF HER MRI. PLEASE CALL PATIENT

## 2024-04-29 DIAGNOSIS — D35.2 PITUITARY ADENOMA: Primary | ICD-10-CM

## 2024-04-29 NOTE — TELEPHONE ENCOUNTER
Called and spoke with pt. Informed pt that we have no received the results from the MRI. Pt stated she completed the MRI at [facility]. Informed pt I would reach out to the hospital and get a copy of the results faxed to our office and someone would be in contact regarding her results.     Contacted [facility] and requested a copy of the MRI be faxed over.

## 2024-10-30 ENCOUNTER — HOSPITAL ENCOUNTER (OUTPATIENT)
Dept: MRI IMAGING | Facility: HOSPITAL | Age: 44
Discharge: HOME | End: 2024-10-30
Admitting: INTERNAL MEDICINE
Payer: COMMERCIAL

## 2024-10-30 DIAGNOSIS — D35.2 PITUITARY ADENOMA: ICD-10-CM

## 2024-10-30 PROCEDURE — A9577 INJ MULTIHANCE: HCPCS | Performed by: INTERNAL MEDICINE

## 2024-10-30 PROCEDURE — 0 GADOBENATE DIMEGLUMINE 529 MG/ML SOLUTION: Performed by: INTERNAL MEDICINE

## 2024-10-30 PROCEDURE — 70553 MRI BRAIN STEM W/O & W/DYE: CPT

## 2024-10-30 RX ADMIN — GADOBENATE DIMEGLUMINE 16 ML: 529 INJECTION, SOLUTION INTRAVENOUS at 10:48

## 2024-12-11 LAB — CREAT BLDA-MCNC: 0.9 MG/DL (ref 0.6–1.3)

## (undated) DEVICE — Device: Brand: DEFENDO AIR/WATER/SUCTION AND BIOPSY VALVE

## (undated) DEVICE — ENDO. PORT CONNECTOR W/VALVE FOR OLYMPUS® SCOPES: Brand: ERBE

## (undated) DEVICE — JACKT LAB KNIT COLR LG BLU

## (undated) DEVICE — SPNG GZ WOVN 4X4IN 12PLY 10/BX STRL

## (undated) DEVICE — GLV SURG SENSICARE MICRO PF LF 6 STRL

## (undated) DEVICE — Device

## (undated) DEVICE — HYBRID TUBING/CAP SET FOR OLYMPUS® SCOPES: Brand: ERBE

## (undated) DEVICE — FRCP BX RADJAW4 NDL 2.8 240CM LG OG BX40

## (undated) DEVICE — GOWN ISOL W/THUMB UNIV BLU BX/15

## (undated) DEVICE — SYR LL 3CC

## (undated) DEVICE — LAB CORP AGAR SLANT UREA PK/10

## (undated) DEVICE — THE BITE BLOCK MAXI, LATEX FREE STRAP IS USED TO PROTECT THE ENDOSCOPE INSERTION TUBE FROM BEING BITTEN BY THE PATIENT.

## (undated) DEVICE — PAD GRND E/S W/CORD SPLT A/

## (undated) DEVICE — JACKT LAB F/R KNIT CUFF/COLR XLG BLU

## (undated) DEVICE — BW-412T DISP COMBO CLEANING BRUSH: Brand: SINGLE USE COMBINATION CLEANING BRUSH

## (undated) DEVICE — VIAL FORMALIN CAP 10P 40ML

## (undated) DEVICE — SUCTION CANISTER, 3000CC,SAFELINER: Brand: DEROYAL

## (undated) DEVICE — MASK,FACE,FLUID RESIST,SHLD,EARLOOP: Brand: MEDLINE

## (undated) DEVICE — TRAP POLYP 4CHAMBER

## (undated) DEVICE — GLV SURG SENSICARE PI MIC PF SZ7.5 LF STRL

## (undated) DEVICE — MASK,FACE,FLUID RES,SHLD,FOGFREE,TIES: Brand: MEDLINE

## (undated) DEVICE — KT ORCA ORCAPOD DISP STRL

## (undated) DEVICE — SNAR POLYP CAPTIFLX WD OVL 27MM 240CM

## (undated) DEVICE — ENDOSCOPY PORT CONNECTOR FOR OLYMPUS® SCOPES: Brand: ERBE